# Patient Record
Sex: FEMALE | Race: BLACK OR AFRICAN AMERICAN | NOT HISPANIC OR LATINO | Employment: FULL TIME | URBAN - METROPOLITAN AREA
[De-identification: names, ages, dates, MRNs, and addresses within clinical notes are randomized per-mention and may not be internally consistent; named-entity substitution may affect disease eponyms.]

---

## 2017-02-09 ENCOUNTER — OFFICE VISIT (OUTPATIENT)
Dept: FAMILY MEDICINE | Facility: CLINIC | Age: 32
End: 2017-02-09
Payer: COMMERCIAL

## 2017-02-09 VITALS
OXYGEN SATURATION: 100 % | DIASTOLIC BLOOD PRESSURE: 67 MMHG | RESPIRATION RATE: 16 BRPM | TEMPERATURE: 97.1 F | SYSTOLIC BLOOD PRESSURE: 108 MMHG | WEIGHT: 145 LBS | BODY MASS INDEX: 21.98 KG/M2 | HEIGHT: 68 IN | HEART RATE: 62 BPM

## 2017-02-09 DIAGNOSIS — B37.31 CANDIDIASIS OF VULVA AND VAGINA: ICD-10-CM

## 2017-02-09 DIAGNOSIS — N76.0 BV (BACTERIAL VAGINOSIS): ICD-10-CM

## 2017-02-09 DIAGNOSIS — B96.89 BV (BACTERIAL VAGINOSIS): ICD-10-CM

## 2017-02-09 DIAGNOSIS — R10.9 FLANK PAIN: ICD-10-CM

## 2017-02-09 DIAGNOSIS — R30.0 DYSURIA: Primary | ICD-10-CM

## 2017-02-09 DIAGNOSIS — N39.0 URINARY TRACT INFECTION WITHOUT HEMATURIA, SITE UNSPECIFIED: ICD-10-CM

## 2017-02-09 LAB
ALBUMIN UR-MCNC: NEGATIVE MG/DL
APPEARANCE UR: CLEAR
BACTERIA #/AREA URNS HPF: ABNORMAL /HPF
BILIRUB UR QL STRIP: NEGATIVE
COLOR UR AUTO: YELLOW
GLUCOSE UR STRIP-MCNC: NEGATIVE MG/DL
HGB UR QL STRIP: NEGATIVE
KETONES UR STRIP-MCNC: NEGATIVE MG/DL
LEUKOCYTE ESTERASE UR QL STRIP: ABNORMAL
MICRO REPORT STATUS: ABNORMAL
NITRATE UR QL: NEGATIVE
NON-SQ EPI CELLS #/AREA URNS LPF: ABNORMAL /LPF
PH UR STRIP: 6.5 PH (ref 5–7)
RBC #/AREA URNS AUTO: ABNORMAL /HPF (ref 0–2)
SP GR UR STRIP: 1.02 (ref 1–1.03)
SPECIMEN SOURCE: ABNORMAL
URN SPEC COLLECT METH UR: ABNORMAL
UROBILINOGEN UR STRIP-ACNC: 1 EU/DL (ref 0.2–1)
WBC #/AREA URNS AUTO: ABNORMAL /HPF (ref 0–2)
WET PREP SPEC: ABNORMAL

## 2017-02-09 PROCEDURE — 87210 SMEAR WET MOUNT SALINE/INK: CPT | Performed by: NURSE PRACTITIONER

## 2017-02-09 PROCEDURE — 99213 OFFICE O/P EST LOW 20 MIN: CPT | Performed by: NURSE PRACTITIONER

## 2017-02-09 PROCEDURE — 81001 URINALYSIS AUTO W/SCOPE: CPT | Performed by: NURSE PRACTITIONER

## 2017-02-09 RX ORDER — COPPER 313.4 MG/1
1 INTRAUTERINE DEVICE INTRAUTERINE ONCE
COMMUNITY
End: 2017-12-20

## 2017-02-09 RX ORDER — METRONIDAZOLE 7.5 MG/G
1 GEL VAGINAL AT BEDTIME
Qty: 70 G | Refills: 0 | Status: SHIPPED | OUTPATIENT
Start: 2017-02-09 | End: 2017-02-14

## 2017-02-09 RX ORDER — FLUCONAZOLE 150 MG/1
150 TABLET ORAL ONCE
Qty: 1 TABLET | Refills: 0 | Status: SHIPPED | OUTPATIENT
Start: 2017-02-09 | End: 2017-02-09

## 2017-02-09 RX ORDER — CEPHALEXIN 500 MG/1
500 CAPSULE ORAL 3 TIMES DAILY
Qty: 21 CAPSULE | Refills: 0 | Status: SHIPPED | OUTPATIENT
Start: 2017-02-09 | End: 2017-02-28

## 2017-02-09 ASSESSMENT — PAIN SCALES - GENERAL: PAINLEVEL: NO PAIN (0)

## 2017-02-09 NOTE — PROGRESS NOTES
"  SUBJECTIVE:                                                    Adrian Gomez is a 31 year old female who presents to clinic today for the following health issues:    URINARY TRACT SYMPTOMS     Onset: 3-4 days     Description:   Painful urination (Dysuria): YES and frequency  Blood in urine (Hematuria): no   Delay in urine (Hesitency): no     Intensity: mild    Progression of Symptoms:  worsening    Accompanying Signs & Symptoms:  Fever/chills: no   Flank pain YES-bilateral  Nausea and vomiting: YES-nausea, no vomiting  Any vaginal symptoms: vaginal discharge  Abdominal/Pelvic Pain: YES-suprapubic   History:   History of frequent UTI's: was recently treated for UTI  History of kidney stones: no   Sexually Active: YES  Possibility of pregnancy: No    Precipitating factors:   Recently treated for UTI         Therapies Tried and outcome: none  Patient is still breast feeding once a day.  She did not complete the Keflex treatment as prescribed in December- took it for a couple of day, stopped it as she was feeling better but symptoms returned in January and she then finished the antibiotic.      Problem list and histories reviewed & adjusted, as indicated.  Additional history: as documented    BP Readings from Last 3 Encounters:   02/09/17 108/67   12/30/16 102/72   04/05/16 110/58    Wt Readings from Last 3 Encounters:   02/09/17 145 lb (65.772 kg)   12/30/16 140 lb (63.504 kg)   04/05/16 150 lb (68.04 kg)                  Labs reviewed in EPIC  Problem list, Medication list, Allergies, and Medical/Social/Surgical histories reviewed in [facility] and updated as appropriate.    ROS:  Constitutional, HEENT, cardiovascular, pulmonary, gi and gu systems are negative, except as otherwise noted.    OBJECTIVE:                                                    /67 mmHg  Pulse 62  Temp(Src) 97.1  F (36.2  C) (Oral)  Resp 16  Ht 5' 7.75\" (1.721 m)  Wt 145 lb (65.772 kg)  BMI 22.21 kg/m2  SpO2 100%  LMP 01/27/2017  " "Breastfeeding? Yes  Body mass index is 22.21 kg/(m^2).  GENERAL: healthy, alert and no distress  NECK: no adenopathy, no asymmetry, masses, or scars and thyroid normal to palpation  RESP: lungs clear to auscultation - no rales, rhonchi or wheezes  CV: regular rate and rhythm, normal S1 S2, no S3 or S4, no murmur, click or rub, no peripheral edema and peripheral pulses strong  ABDOMEN: soft, nontender, no hepatosplenomegaly, no masses and bowel sounds normal   (female): deferred  MS: no gross musculoskeletal defects noted, no edema  SKIN: no suspicious lesions or rashes  BACK: no CVA tenderness, no paralumbar tenderness  PSYCH: mentation appears normal, affect normal/bright    Diagnostic Test Results:  Results for orders placed or performed in visit on 02/09/17 (from the past 24 hour(s))   *UA reflex to Microscopic and Culture ([facility] (except [place] and [place])   Result Value Ref Range    Color Urine Yellow     Appearance Urine Clear     Glucose Urine Negative NEG mg/dL    Bilirubin Urine Negative NEG    Ketones Urine Negative NEG mg/dL    Specific Gravity Urine 1.020 1.003 - 1.035    Blood Urine Negative NEG    pH Urine 6.5 5.0 - 7.0 pH    Protein Albumin Urine Negative NEG mg/dL    Urobilinogen Urine 1.0 0.2 - 1.0 EU/dL    Nitrite Urine Negative NEG    Leukocyte Esterase Urine Small (A) NEG    Source Midstream Urine    Urine Microscopic   Result Value Ref Range    WBC Urine 5-10 (A) 0 - 2 /HPF    RBC Urine O - 2 0 - 2 /HPF    Squamous Epithelial /LPF Urine Few FEW /LPF    Bacteria Urine Few (A) NEG /HPF        ASSESSMENT/PLAN:                                                          BMI:   Estimated body mass index is 22.21 kg/(m^2) as calculated from the following:    Height as of this encounter: 5' 7.75\" (1.721 m).    Weight as of this encounter: 145 lb (65.772 kg).         1. Dysuria    - *UA reflex to Microscopic and Culture ([facility] (except " [place] and [place])  - Urine Microscopic  - Wet prep    2. Flank pain      3. Urinary tract infection without hematuria, site unspecified  Instructed to increase oral fluids, reviewed genital hygiene measures, return to clinic if not improved, new, or worsening symptoms.   - cephALEXin (KEFLEX) 500 MG capsule; Take 1 capsule (500 mg) by mouth 3 times daily  Dispense: 21 capsule; Refill: 0    See Patient Instructions    [facility]

## 2017-02-09 NOTE — MR AVS SNAPSHOT
After Visit Summary   2/9/2017    Adrian Gomez    MRN: [id number]           Patient Information     Date Of Birth          1985        Visit Information        Provider Department      2/9/2017 8:20 AM Ama Hazel APRN [facility]        Today's Diagnoses     Dysuria    -  1     Flank pain         Urinary tract infection without hematuria, site unspecified           Care Instructions    How to contact your care team: [phone] Pharmacy [phone]     Clinic hours M-Th 7am-7pm Fri 7am-5pm.   Urgent care M-F 11am-9pm  Sat/Sun 9am-5pm.   Pharmacy   Mon-Th:  8:00am-8pm   Fri:  8:00am-6:00pm  Sat/Sun  8:00am-5:00 pm       Urinary Tract Infections in Women  Urinary tract infections (UTIs) are most often caused by bacteria (germs). These bacteria enter the urinary tract. The bacteria may come from outside the body. Or they may travel from the skin outside the rectum or vagina into the urethra. Female anatomy makes it easier for bacteria from the bowel to enter a woman s urinary tract, which is the most common source of UTI. This means women develop UTIs more often than men. Pain in or around the urinary tract is a common UTI symptom. But the only way to know for sure if you have a UTI for the health care provider to test your urine. The two tests that may be done are the urinalysis and urine culture.  Types of UTIs    Cystitis: A bladder infection (cystitis) is the most common UTI in women. You may have urgent or frequent urination. You may also have pain, burning when you urinate, and bloody urine.    Urethritis: This is an inflamed urethra, which is the tube that carries urine from the bladder to outside the body. You may have lower stomach or back pain. You may also have urgent or frequent urination.    Pyelonephritis: This is a kidney infection. If not treated, it can be serious and damage your kidneys. In severe cases, you may be hospitalized. You may have a  fever and lower back pain.  Medications to treat a UTI  Most UTIs are treated with antibiotics. These kill the bacteria. The length of time you need to take them depends on the type of infection. It may be as short as 3 days. If you have repeated UTIs, a low-dose antibiotic may be needed for several months. Take antibiotics exactly as directed. Don t stop taking them until all of the medication is gone. If you stop taking the antibiotic too soon, the infection may not go away, and you may develop a resistance to the antibiotic. This can make it much harder to treat.  Lifestyle changes to treat and prevent UTIs  The lifestyle changes below will help get rid of your UTI. They may also help prevent future UTIs.    Drink plenty of fluids. This includes water, juice, or other caffeine-free drinks. Fluids help flush bacteria out of your body.    Empty your bladder. Always empty your bladder when you feel the urge to urinate. And always urinate before going to sleep. Urine that stays in your bladder can lead to infection. Try to urinate before and after sex as well.    Practice good personal hygiene. Wipe yourself from front to back after using the toilet. This helps keep bacteria from getting into the urethra.    Use condoms during sex. These help prevent UTIs caused by sexually transmitted bacteria. Also, avoid using spermicides during sex. These can increase the risk of UTIs. Choose other forms of birth control instead. For women who tend to get UTIs after sex, a low-dose of a preventive antibiotic may be used. Be sure to discuss this option with your health care provider.    Follow up with your health care provider as directed. He or she may test to make sure the infection has cleared. If necessary, additional treatment may be started.    0809-6667 [employer]. [address] [place] [address], [place] [address]. All rights reserved. This information is not intended as a substitute for professional medical care.  "Always follow your healthcare professional's instructions.              Follow-ups after your visit        Who to contact     If you have questions or need follow up information about today's clinic visit or your schedule please contact [facility] ANITA BARRIGA directly at [phone].  Normal or non-critical lab and imaging results will be communicated to you by MyChart, letter or phone within 4 business days after the clinic has received the results. If you do not hear from us within 7 days, please contact the clinic through MyChart or phone. If you have a critical or abnormal lab result, we will notify you by phone as soon as possible.  Submit refill requests through [employer] or call your pharmacy and they will forward the refill request to us. Please allow 3 business days for your refill to be completed.          Additional Information About Your Visit        [employer] Information     [employer] lets you send messages to your doctor, view your test results, renew your prescriptions, schedule appointments and more. To sign up, go to [email or web address] . Click on \"Log in\" on the left side of the screen, which will take you to the Welcome page. Then click on \"Sign up Now\" on the right side of the page.     You will be asked to enter the access code listed below, as well as some personal information. Please follow the directions to create your username and password.     Your access code is: [email or web address]  Expires: 5/10/2017  9:05 AM     Your access code will  in 90 days. If you need help or a new code, please call your [facility] clinic or [phone].        Care EveryWhere ID     This is your Care EveryWhere ID. This could be used by other organizations to access your [facility] medical records  [id number]        Your Vitals Were     Pulse Temperature Respirations    62 97.1  F (36.2  C) (Oral) 16    Height BMI (Body Mass Index) Pulse Oximetry    5' 7.75\" (1.721 m) 22.21 kg/m2 100%    Last Period " Breastfeeding?       01/27/2017 Yes        Blood Pressure from Last 3 Encounters:   02/09/17 108/67   12/30/16 102/72   04/05/16 110/58    Weight from Last 3 Encounters:   02/09/17 145 lb (65.772 kg)   12/30/16 140 lb (63.504 kg)   04/05/16 150 lb (68.04 kg)              We Performed the Following     *UA reflex to Microscopic and Culture ([facility] (except [place] and [place])     Urine Microscopic     Wet prep          Today's Medication Changes          These changes are accurate as of: 2/9/17  9:05 AM.  If you have any questions, ask your nurse or doctor.               Start taking these medicines.        Dose/Directions    cephALEXin 500 MG capsule   Commonly known as:  KEFLEX   Used for:  Urinary tract infection without hematuria, site unspecified   Started by:  Ama Hazel APRN CNP        Dose:  500 mg   Take 1 capsule (500 mg) by mouth 3 times daily   Quantity:  21 capsule   Refills:  0            Where to get your medicines      These medications were sent to [facility] - [place], [place] - [address]  [address] [facility] [address]     Phone:  [phone]    - cephALEXin 500 MG capsule             Primary Care Provider    None Specified       No primary provider on file.        Thank you!     Thank you for choosing [facility]  for your care. Our goal is always to provide you with excellent care. Hearing back from our patients is one way we can continue to improve our services. Please take a few minutes to complete the written survey that you may receive in the mail after your visit with us. Thank you!             Your Updated Medication List - Protect others around you: Learn how to safely use, store and throw away your medicines at www.disposemymeds.org.          This list is accurate as of: 2/9/17  9:05 AM.  Always use your most recent med list.                   Brand Name Dispense Instructions for use     cephALEXin 500 MG capsule    KEFLEX    21 capsule    Take 1 capsule (500 mg) by mouth 3 times daily       paragard intrauterine copper      1 each by Intrauterine route once

## 2017-02-09 NOTE — NURSING NOTE
"Chief Complaint   Patient presents with     Urinary Problem       Initial /67 mmHg  Pulse 62  Temp(Src) 97.1  F (36.2  C) (Oral)  Resp 16  Ht 5' 7.75\" (1.721 m)  Wt 145 lb (65.772 kg)  BMI 22.21 kg/m2  SpO2 100%  LMP 01/27/2017  Breastfeeding? Yes Estimated body mass index is 22.21 kg/(m^2) as calculated from the following:    Height as of this encounter: 5' 7.75\" (1.721 m).    Weight as of this encounter: 145 lb (65.772 kg).  Medication Reconciliation: complete     Marge Loya MA       "

## 2017-02-09 NOTE — PATIENT INSTRUCTIONS
How to contact your care team: [phone] Pharmacy [phone]     Clinic hours M-Th 7am-7pm Fri 7am-5pm.   Urgent care M-F 11am-9pm  Sat/Sun 9am-5pm.   Pharmacy   Mon-Th:  8:00am-8pm   Fri:  8:00am-6:00pm  Sat/Sun  8:00am-5:00 pm       Urinary Tract Infections in Women  Urinary tract infections (UTIs) are most often caused by bacteria (germs). These bacteria enter the urinary tract. The bacteria may come from outside the body. Or they may travel from the skin outside the rectum or vagina into the urethra. Female anatomy makes it easier for bacteria from the bowel to enter a woman s urinary tract, which is the most common source of UTI. This means women develop UTIs more often than men. Pain in or around the urinary tract is a common UTI symptom. But the only way to know for sure if you have a UTI for the health care provider to test your urine. The two tests that may be done are the urinalysis and urine culture.  Types of UTIs    Cystitis: A bladder infection (cystitis) is the most common UTI in women. You may have urgent or frequent urination. You may also have pain, burning when you urinate, and bloody urine.    Urethritis: This is an inflamed urethra, which is the tube that carries urine from the bladder to outside the body. You may have lower stomach or back pain. You may also have urgent or frequent urination.    Pyelonephritis: This is a kidney infection. If not treated, it can be serious and damage your kidneys. In severe cases, you may be hospitalized. You may have a fever and lower back pain.  Medications to treat a UTI  Most UTIs are treated with antibiotics. These kill the bacteria. The length of time you need to take them depends on the type of infection. It may be as short as 3 days. If you have repeated UTIs, a low-dose antibiotic may be needed for several months. Take antibiotics exactly as directed. Don t stop taking them until all of the medication is gone. If you stop taking the antibiotic  too soon, the infection may not go away, and you may develop a resistance to the antibiotic. This can make it much harder to treat.  Lifestyle changes to treat and prevent UTIs  The lifestyle changes below will help get rid of your UTI. They may also help prevent future UTIs.    Drink plenty of fluids. This includes water, juice, or other caffeine-free drinks. Fluids help flush bacteria out of your body.    Empty your bladder. Always empty your bladder when you feel the urge to urinate. And always urinate before going to sleep. Urine that stays in your bladder can lead to infection. Try to urinate before and after sex as well.    Practice good personal hygiene. Wipe yourself from front to back after using the toilet. This helps keep bacteria from getting into the urethra.    Use condoms during sex. These help prevent UTIs caused by sexually transmitted bacteria. Also, avoid using spermicides during sex. These can increase the risk of UTIs. Choose other forms of birth control instead. For women who tend to get UTIs after sex, a low-dose of a preventive antibiotic may be used. Be sure to discuss this option with your health care provider.    Follow up with your health care provider as directed. He or she may test to make sure the infection has cleared. If necessary, additional treatment may be started.    2371-9441 [employer] [address] [place] [address], [place] [address]. All rights reserved. This information is not intended as a substitute for professional medical care. Always follow your healthcare professional's instructions.

## 2017-02-22 ENCOUNTER — TELEPHONE (OUTPATIENT)
Dept: NURSING | Facility: CLINIC | Age: 32
End: 2017-02-22

## 2017-02-22 NOTE — TELEPHONE ENCOUNTER
"Call Type: Triage Call    Presenting Problem: \"See for UTI\" in December. Given cephalsporin.  Never did a sensitivity. Having symptoms again. Frequency with  urination.  Triage Note:  Guideline Title: Urinary Symptoms - Female  Recommended Disposition: See Provider within 24 hours  Original Inclination: Did not know what to do  Override Disposition:  Intended Action: Follow advice given  Physician Contacted: No  Has one or more urinary tract symptoms AND has not been previously evaluated ?  YES  Blood in urine ? NO  Abnormal vaginal discharge (such as increased quantity, abnormal color,  consistency, odor) ? NO  Flank pain ? NO  New or worsening signs and symptoms that may indicate shock ? NO  Any temperature elevation in a frail elderly, immunocompromised or pregnant person  ? NO  Unbearable abdominal/pelvic pain ? NO  Current or recent urinary tract instrumentation AND urinary tract symptoms OR no  urine flow ? NO  Urinary tract symptoms AND any flank or low back pain ? NO  Urinary tract symptoms AND fever 101.5 F (38.6 C) or higher or vomiting ? NO  No urination for 12 or more hours ? NO  Any other unexpected urinary symptoms following urinary tract or abdominal surgery  within timeframe specified by provider ? NO  Physician Instructions:  Care Advice: Call provider if you develop flank or low back pain, fever,  generally feel sick.  SYMPTOM / CONDITION MANAGEMENT  Call provider if urine is pink, red, smoky or cola colored.  Increase intake of fluids. Try to drink 8 oz. (.2 liter) every hour when  awake, unless on restricted fluids for other medical reasons. Include at  least two 8 oz. (.2 liter) glasses of unsweetened cranberry juice each day.  Take sips of fluid or eat ice chips if nauseated or vomiting.  Analgesic/Antipyretic Advice - NSAIDs: Consider aspirin, ibuprofen,  naproxen or ketoprofen for pain or fever as directed on label or by  pharmacist/provider. PRECAUTIONS: - You should not take this medicine " for  more than 10 days unless recommended by your provider. EXCEPTIONS: - Should  not be used if taking blood thinners or have bleeding problems. - Do not  use if have history of sensitivity/allergy to any of these medications  or history of cardiovascular, ulcer, kidney, liver disease or diabetes  unless approved by provider. - Do not exceed recommended dose or frequency.

## 2017-02-28 ENCOUNTER — OFFICE VISIT (OUTPATIENT)
Dept: FAMILY MEDICINE | Facility: CLINIC | Age: 32
End: 2017-02-28
Payer: COMMERCIAL

## 2017-02-28 VITALS
SYSTOLIC BLOOD PRESSURE: 110 MMHG | HEART RATE: 68 BPM | WEIGHT: 146 LBS | OXYGEN SATURATION: 100 % | BODY MASS INDEX: 22.13 KG/M2 | HEIGHT: 68 IN | TEMPERATURE: 96.2 F | DIASTOLIC BLOOD PRESSURE: 70 MMHG

## 2017-02-28 DIAGNOSIS — N30.00 ACUTE CYSTITIS WITHOUT HEMATURIA: ICD-10-CM

## 2017-02-28 DIAGNOSIS — R82.90 NONSPECIFIC FINDING ON EXAMINATION OF URINE: Primary | ICD-10-CM

## 2017-02-28 LAB
ALBUMIN UR-MCNC: NEGATIVE MG/DL
APPEARANCE UR: ABNORMAL
BILIRUB UR QL STRIP: NEGATIVE
COLOR UR AUTO: YELLOW
GLUCOSE UR STRIP-MCNC: NEGATIVE MG/DL
HGB UR QL STRIP: NEGATIVE
KETONES UR STRIP-MCNC: NEGATIVE MG/DL
LEUKOCYTE ESTERASE UR QL STRIP: ABNORMAL
NITRATE UR QL: NEGATIVE
NON-SQ EPI CELLS #/AREA URNS LPF: ABNORMAL /LPF
PH UR STRIP: 7 PH (ref 5–7)
RBC #/AREA URNS AUTO: ABNORMAL /HPF (ref 0–2)
SP GR UR STRIP: 1.01 (ref 1–1.03)
URN SPEC COLLECT METH UR: ABNORMAL
UROBILINOGEN UR STRIP-ACNC: 0.2 EU/DL (ref 0.2–1)
WBC #/AREA URNS AUTO: ABNORMAL /HPF (ref 0–2)

## 2017-02-28 PROCEDURE — 87086 URINE CULTURE/COLONY COUNT: CPT | Performed by: FAMILY MEDICINE

## 2017-02-28 PROCEDURE — 99213 OFFICE O/P EST LOW 20 MIN: CPT | Performed by: FAMILY MEDICINE

## 2017-02-28 PROCEDURE — 81001 URINALYSIS AUTO W/SCOPE: CPT | Performed by: FAMILY MEDICINE

## 2017-02-28 RX ORDER — SULFAMETHOXAZOLE/TRIMETHOPRIM 800-160 MG
1 TABLET ORAL 2 TIMES DAILY
Qty: 14 TABLET | Refills: 0 | Status: SHIPPED | OUTPATIENT
Start: 2017-02-28 | End: 2017-03-07

## 2017-02-28 NOTE — MR AVS SNAPSHOT
After Visit Summary   2/28/2017    Adrian Gomez    MRN: [id number]           Patient Information     Date Of Birth          1985        Visit Information        Provider Department      2/28/2017 3:40 PM Jessica Nunez MD [facility]        Today's Diagnoses     Nonspecific finding on examination of urine    -  1    Acute cystitis without hematuria          Care Instructions      [facility] (formerly [facility])    If you have any questions regarding to your visit please contact your care team:       Team Red:   Clinic Hours Telephone Number   Dr. Jovanna Palomo  (pediatrics)  Natalie Slaughter NP 7am-7pm  Monday - Thursday   7am-5pm  Fridays  [phone] (fax)    Caterina STONE  [phone]   Urgent Care - [place] and [place] Monday-Friday  [place] - 11am-8pm  Saturday-Sunday  Both sites - 9am-5pm  [phone] - [facility]  [phone] - [place]       What options do I have for visits at the clinic other than the traditional office visit?  To expand how we care for you, many of our providers are utilizing electronic visits (e-visits) and telephone visits, when medically appropriate, for interactions with their patients rather than a visit in the clinic.   We also offer nurse visits for many medical concerns. Just like any other service, we will bill your insurance company for this type of visit based on time spent on the phone with your provider. Not all insurance companies cover these visits. Please check with your medical insurance if this type of visit is covered. You will be responsible for any charges that are not paid by your insurance.      E-visits via [employer]:  generally incur a $35.00 fee.  Telephone visits:  Time spent on the phone: *charged based on time that is spent on the phone in increments of 10 minutes. Estimated cost:   5-10 mins $30.00   11-20 mins. $59.00   21-30 mins. $85.00     As always, Thank you for  trusting us with your health care needs!            Understanding Urinary Tract Infections (UTIs)  Most UTIs are caused by bacteria, although they may also be caused by viruses or fungi. Bacteria from the bowel are the most common source of infection. The infection may begin because of any of the following:    Sexual activity. During sex, germs can travel from the penis, vagina, or rectum into the urethra.     Germs on the skin outside the rectum may travel into the urethra. This is more common in women since the rectum and urethra are closer to each other than in men. Wiping from front to back after using the toilet and keeping the area clean can help prevent germs from getting to the urethra.    Blockage of urine flow through the urinary tract. If urine sits too long, germs may begin to grow out of control.      Parts of the urinary tract  The infection can occur in any part of the urinary tract.    The kidneys collect and store urine.    The ureters carry urine from the kidneys to the bladder.    The bladder holds urine until you are ready to let it out.    The urethra carries urine from the bladder out of the body. It is shorter in women, so bacteria can move through it more easily. The urethra is longer in men, so a UTI is less likely to reach the bladder or kidneys in men.    7717-8898 [employer]. [address] [place] [address], [place] [id number]. All rights reserved. This information is not intended as a substitute for professional medical care. Always follow your healthcare professional's instructions.        Anatomy of the Female Urinary Tract  Your urinary tract helps get rid of urine (your body s liquid waste). The kidneys collect chemicals and water your body doesn t need. This is turned into urine. Urine travels out of the kidneys through the ureters to the bladder. The bladder holds urine until you re ready to release it. The urethra carries urine from the bladder out of the body. The main  "sphincter muscle circles the mid-urethra.       1257-0266 [employer]. [address] [place] [address], [place] [address]. All rights reserved. This information is not intended as a substitute for professional medical care. Always follow your healthcare professional's instructions.      Discharge LORETTA Erwin  [facility]          Follow-ups after your visit        Who to contact     If you have questions or need follow up information about today's clinic visit or your schedule please contact [facility] directly at [phone].  Normal or non-critical lab and imaging results will be communicated to you by Cirrascalehart, letter or phone within 4 business days after the clinic has received the results. If you do not hear from us within 7 days, please contact the clinic through Liftopiat or phone. If you have a critical or abnormal lab result, we will notify you by phone as soon as possible.  Submit refill requests through [employer] Hunted or call your pharmacy and they will forward the refill request to us. Please allow 3 business days for your refill to be completed.          Additional Information About Your Visit        CirrascaleharStoryBlender Information     [employer] gives you secure access to your electronic health record. If you see a primary care provider, you can also send messages to your care team and make appointments. If you have questions, please call your primary care clinic.  If you do not have a primary care provider, please call [phone] and they will assist you.        Care EveryWhere ID     This is your Care EveryWhere ID. This could be used by other organizations to access your [facility] medical records  [id number]        Your Vitals Were     Pulse Temperature Height Last Period Pulse Oximetry BMI (Body Mass Index)    68 96.2  F (35.7  C) (Oral) 5' 7.75\" (1.721 m) 02/19/2017 100% 22.36 kg/m2       Blood Pressure from Last 3 Encounters:   02/28/17 110/70   02/09/17 108/67   12/30/16 102/72    Weight from Last " 3 Encounters:   02/28/17 146 lb (66.2 kg)   02/09/17 145 lb (65.8 kg)   12/30/16 140 lb (63.5 kg)              We Performed the Following     UA reflex to Microscopic and Culture     Urine Culture Aerobic Bacterial     Urine Culture Aerobic Bacterial     Urine Microscopic          Today's Medication Changes          These changes are accurate as of: 2/28/17  4:26 PM.  If you have any questions, ask your nurse or doctor.               Start taking these medicines.        Dose/Directions    sulfamethoxazole-trimethoprim 800-160 MG per tablet   Commonly known as:  BACTRIM DS/SEPTRA DS   Used for:  Acute cystitis without hematuria   Started by:  Jessica Nunez MD        Dose:  1 tablet   Take 1 tablet by mouth 2 times daily for 7 days   Quantity:  14 tablet   Refills:  0            Where to get your medicines      These medications were sent to [facility] - [place], [place] - [address]  [address], [facility] [address]     Phone:  [phone]     sulfamethoxazole-trimethoprim 800-160 MG per tablet                Primary Care Provider    None Specified       No primary provider on file.        Thank you!     Thank you for choosing [facility]  for your care. Our goal is always to provide you with excellent care. Hearing back from our patients is one way we can continue to improve our services. Please take a few minutes to complete the written survey that you may receive in the mail after your visit with us. Thank you!             Your Updated Medication List - Protect others around you: Learn how to safely use, store and throw away your medicines at www.disposemymeds.org.          This list is accurate as of: 2/28/17  4:26 PM.  Always use your most recent med list.                   Brand Name Dispense Instructions for use    paragard intrauterine copper      1 each by Intrauterine route once       sulfamethoxazole-trimethoprim 800-160 MG per tablet    BACTRIM  DS/SEPTRA DS    14 tablet    Take 1 tablet by mouth 2 times daily for 7 days

## 2017-02-28 NOTE — PATIENT INSTRUCTIONS
[facility] ([facility]    If you have any questions regarding to your visit please contact your care team:       Team Red:   Clinic Hours Telephone Number   Dr. Jovanna Palomo  (pediatrics)  Natalie Slaughter NP 7am-7pm  Monday - Thursday   7am-5pm  Fridays  [phone] (fax)    Caterina STONE  [phone]   Urgent Care - [place] and [place] Monday-Friday  [place] - 11am-8pm  Saturday-Sunday  Both sites - 9am-5pm  [phone] - [facility]  [phone] - [place]       What options do I have for visits at the clinic other than the traditional office visit?  To expand how we care for you, many of our providers are utilizing electronic visits (e-visits) and telephone visits, when medically appropriate, for interactions with their patients rather than a visit in the clinic.   We also offer nurse visits for many medical concerns. Just like any other service, we will bill your insurance company for this type of visit based on time spent on the phone with your provider. Not all insurance companies cover these visits. Please check with your medical insurance if this type of visit is covered. You will be responsible for any charges that are not paid by your insurance.      E-visits via CloudOpt:  generally incur a $35.00 fee.  Telephone visits:  Time spent on the phone: *charged based on time that is spent on the phone in increments of 10 minutes. Estimated cost:   5-10 mins $30.00   11-20 mins. $59.00   21-30 mins. $85.00     As always, Thank you for trusting us with your health care needs!            Understanding Urinary Tract Infections (UTIs)  Most UTIs are caused by bacteria, although they may also be caused by viruses or fungi. Bacteria from the bowel are the most common source of infection. The infection may begin because of any of the following:    Sexual activity. During sex, germs can travel from the penis, vagina, or rectum into the  urethra.     Germs on the skin outside the rectum may travel into the urethra. This is more common in women since the rectum and urethra are closer to each other than in men. Wiping from front to back after using the toilet and keeping the area clean can help prevent germs from getting to the urethra.    Blockage of urine flow through the urinary tract. If urine sits too long, germs may begin to grow out of control.      Parts of the urinary tract  The infection can occur in any part of the urinary tract.    The kidneys collect and store urine.    The ureters carry urine from the kidneys to the bladder.    The bladder holds urine until you are ready to let it out.    The urethra carries urine from the bladder out of the body. It is shorter in women, so bacteria can move through it more easily. The urethra is longer in men, so a UTI is less likely to reach the bladder or kidneys in men.    8224-0194 [employer]. [address] [place] [address]. All rights reserved. This information is not intended as a substitute for professional medical care. Always follow your healthcare professional's instructions.        Anatomy of the Female Urinary Tract  Your urinary tract helps get rid of urine (your body s liquid waste). The kidneys collect chemicals and water your body doesn t need. This is turned into urine. Urine travels out of the kidneys through the ureters to the bladder. The bladder holds urine until you re ready to release it. The urethra carries urine from the bladder out of the body. The main sphincter muscle circles the mid-urethra.       8287-1331 [employer]. [address] [place] [address]. All rights reserved. This information is not intended as a substitute for professional medical care. Always follow your healthcare professional's instructions.      Discharge LORETTA Erwin CMA

## 2017-02-28 NOTE — NURSING NOTE
"Chief Complaint   Patient presents with     UTI       Initial /70 (BP Location: Left arm, Patient Position: Chair, Cuff Size: Adult Regular)  Pulse 68  Temp 96.2  F (35.7  C) (Oral)  Ht 5' 7.75\" (1.721 m)  Wt 146 lb (66.2 kg)  LMP 02/19/2017  SpO2 100%  BMI 22.36 kg/m2 Estimated body mass index is 22.36 kg/(m^2) as calculated from the following:    Height as of this encounter: 5' 7.75\" (1.721 m).    Weight as of this encounter: 146 lb (66.2 kg).  Medication Reconciliation: complete     Constance Gipson MA      "

## 2017-02-28 NOTE — PROGRESS NOTES
SUBJECTIVE:                                                    Adrian Gomez is a 31 year old female who presents to clinic today for the following health issues:      URINARY TRACT SYMPTOMS      Duration: 2 days    Description  Frequency, and a pain when finishing urinating (like a pinch)    Intensity:  mild    Accompanying signs and symptoms:  Fever/chills: no   Flank pain no   Nausea and vomiting: no   Vaginal symptoms: none  Abdominal/Pelvic Pain: no     History  History of frequent UTI's: YES- recent UTI 3 weeks ago  And also in December  History of kidney stones: no   Sexually Active: YES  Possibility of pregnancy: No    Precipitating or alleviating factors: None    Therapies tried and outcome: none              Problem list and histories reviewed & adjusted, as indicated.  Additional history: as documented    Patient Active Problem List   Diagnosis     CARDIOVASCULAR SCREENING; LDL GOAL LESS THAN 160     Personal history of tuberculosis     Acute cystitis without hematuria     Past Surgical History   Procedure Laterality Date     None         Social History   Substance Use Topics     Smoking status: Never Smoker     Smokeless tobacco: Never Used     Alcohol use Yes      Comment: 2 drinks a month     Family History   Problem Relation Age of Onset     Family History Negative Mother      Breast Cancer Mother      Family History Negative Father      HEART DISEASE Maternal Grandmother      HEART DISEASE Maternal Grandfather      HEART DISEASE Paternal Grandmother      HEART DISEASE Paternal Grandfather      DIABETES No family hx of      Hypertension No family hx of          Current Outpatient Prescriptions   Medication Sig Dispense Refill     sulfamethoxazole-trimethoprim (BACTRIM DS/SEPTRA DS) 800-160 MG per tablet Take 1 tablet by mouth 2 times daily for 7 days 14 tablet 0     paragard intrauterine copper 1 each by Intrauterine route once       No Known Allergies  No lab results found.   BP Readings from Last 3  "Encounters:   02/28/17 110/70   02/09/17 108/67   12/30/16 102/72    Wt Readings from Last 3 Encounters:   02/28/17 146 lb (66.2 kg)   02/09/17 145 lb (65.8 kg)   12/30/16 140 lb (63.5 kg)                  Labs reviewed in EPIC    Reviewed and updated as needed this visit by clinical staff       Reviewed and updated as needed this visit by Provider         ROS:  C: NEGATIVE for fever, chills, change in weight  E/M: NEGATIVE for ear, mouth and throat problems  R: NEGATIVE for significant cough or SOB  CV: NEGATIVE for chest pain, palpitations or peripheral edema  GI: NEGATIVE for nausea, abdominal pain, heartburn, or change in bowel habits  : normal menstrual cycles and as above    OBJECTIVE:                                                    /70 (BP Location: Left arm, Patient Position: Chair, Cuff Size: Adult Regular)  Pulse 68  Temp 96.2  F (35.7  C) (Oral)  Ht 5' 7.75\" (1.721 m)  Wt 146 lb (66.2 kg)  LMP 02/19/2017  SpO2 100%  BMI 22.36 kg/m2  Body mass index is 22.36 kg/(m^2).  GENERAL: healthy, alert and no distress    CV: regular rate and rhythm, normal S1 S2, no S3 or S4, no murmur, click or rub, no peripheral edema and peripheral pulses strong  ABDOMEN: soft, nontender, no hepatosplenomegaly, no masses and bowel sounds normal  No flank tenderness   MS: no gross musculoskeletal defects noted, no edema    Diagnostic Test Results:  Results for orders placed or performed in visit on 02/28/17 (from the past 24 hour(s))   UA reflex to Microscopic and Culture   Result Value Ref Range    Color Urine Yellow     Appearance Urine Slightly Cloudy     Glucose Urine Negative NEG mg/dL    Bilirubin Urine Negative NEG    Ketones Urine Negative NEG mg/dL    Specific Gravity Urine 1.015 1.003 - 1.035    Blood Urine Negative NEG    pH Urine 7.0 5.0 - 7.0 pH    Protein Albumin Urine Negative NEG mg/dL    Urobilinogen Urine 0.2 0.2 - 1.0 EU/dL    Nitrite Urine Negative NEG    Leukocyte Esterase Urine Small (A) NEG    " Source Midstream Urine    Urine Microscopic   Result Value Ref Range    WBC Urine  (A) 0 - 2 /HPF    RBC Urine 2-5 (A) 0 - 2 /HPF    Squamous Epithelial /LPF Urine Few FEW /LPF        ASSESSMENT/PLAN:                                                        ICD-10-CM    1. Nonspecific finding on examination of urine R82.90 Urine Culture Aerobic Bacterial   2. Acute cystitis without hematuria N30.00 Urine Culture Aerobic Bacterial     sulfamethoxazole-trimethoprim (BACTRIM DS/SEPTRA DS) 800-160 MG per tablet     Await UC  Drink Lots of fluids  Follow up 1 week if not better/sooner if worse        Jessica Nunez MD  [facility]

## 2017-03-01 LAB
BACTERIA SPEC CULT: NORMAL
MICRO REPORT STATUS: NORMAL
SPECIMEN SOURCE: NORMAL

## 2017-04-03 ENCOUNTER — TELEPHONE (OUTPATIENT)
Dept: NURSING | Facility: CLINIC | Age: 32
End: 2017-04-03

## 2017-04-03 NOTE — TELEPHONE ENCOUNTER
"Call Type: Triage Call    Presenting Problem: Patient is having a \"fever\"102 axillary. Then  temp down to 99. and is having nausea and vomiting. Patient has a  coarse cough. Triaged for fever-Adult/Disposition to see ED  immediately.  Triage Note:  Guideline Title: Fever - Adult ; Fever - Adult  Recommended Disposition: See Provider within 8 Hours  Original Inclination: Wanted to speak with a nurse  Override Disposition: See ED Immediately  Intended Action: Go to Hospital / ED  Physician Contacted: No  Temperature of 101.5 F (38.6 C) or greater that has not responded to 24 hours of  home care measures ?  YES  Signs of dehydration ? NO  Abdominal pain is present ? NO  Sudden change in mental status ? NO  New seizure now or within last 6 hours ? NO  Severe breathing problems not related to nasal congestion ? NO  Has urgency, frequency, discolored urine, pain or burning with urination ? NO  Has urgency, frequency, discolored urine, pain or burning with urination ? NO  New skin rash associated with the fever ? NO  Fever associated with heat exposure ? NO  Associated with cold or upper respiratory infection symptoms ? NO  Sudden onset of flu-like symptoms ? NO  Fever 104 F (40.0 C) or higher ? NO  Less than 4 weeks postoperative or post other invasive procedure ? NO  Worsening signs of soft tissue infection ? NO  Any temperature elevation AND new onset of neck pain with forward head movement  (no injury), severe generalized headache, or altered mental status ? NO  Frail elderly or immunocompromised with an oral temperature greater than 99 F  (37.2) or higher ? NO  Pregnant person with temperature of 100.5 F (38.0 C) or higher ? NO  High to low (but not zero) risk of exposure to Ebola within the past 21 days ? NO  Unexplained blood-colored (purple or red) flat pinpoint dots, spots or patches on  the skin ? NO  Physician Instructions:  Care Advice: It takes 20-60 minutes for fever reducing meds to work.  Take  your " temperature 1-2 hours after taking one of these medications to check  if they are working.  CAUTIONS  HEALTH PROMOTION / MAINTENANCE  SYMPTOM / CONDITION MANAGEMENT  Systemic Inflammatory Response Syndrome (SIRS):   Watch for signs of a  generalized, whole body infection. Occurs within days of a localized  infection, especially of the urinary, GI, respiratory or nervous systems  or after a traumatic injury or invasive procedure.   - Call  if  symptoms have worsened, such as increasing confusion or unusual drowsiness  cold and clammy skin  no urine output  rapid respiration (>30/min.) or slow respiration (<10/min.)  struggling to breathe.   - Go to the ED immediately for early symptoms of  rapid pulse >90/min. or rapid breathing >20/min. at rest  chills  oral temperature >100.4 F (38 C) or <96.8 F (36 C) when associated with  conditions noted.  COMFORT MEASURES FOR A FEVER:   - Drink cool liquids or eat ice chips or  popsicles. Avoid drinks with alcohol or caffeine.   - Wear one layer of  light-weight clothing.   - Consider using a fan to improve circulation.   -  Rest until temperature returns to normal and other symptoms improve.   -  Use a lightweight blanket or other bedding.   - A lukewarm (not cold) bath  or shower can help lower body temperature.  Cold water can cause shivering  and raise temperature. If shivering starts, dry off and cover with  lightweight clothing.  Total water intake includes drinking water, water in beverages, and water  contained in food. Fluids make up about 80% of the body's total hydration  need. Individual fluid requirement to maintain hydration vary based on  physical activity, environmental factors and illness. Limit fluids that  contain sugar, caffeine, or alcohol. Urine will be very light yellow color  when you drink enough fluids.  Analgesic/Antipyretic Advice - NSAIDs: Consider aspirin, ibuprofen,  naproxen or ketoprofen for pain or fever as directed on label or  by  pharmacist/provider. PRECAUTIONS: - You should not take this medicine for  more than 10 days unless recommended by your provider. EXCEPTIONS: - Should  not be used if taking blood thinners or have bleeding problems. - Do not  use if have history of sensitivity/allergy to any of these medications  or history of cardiovascular, ulcer, kidney, liver disease or diabetes  unless approved by provider. - Do not exceed recommended dose or frequency.  Analgesic/Antipyretic Advice - Acetaminophen: Consider acetaminophen as  directed on label or by pharmacist/provider for pain or fever. PRECAUTIONS:  - Use if there is no history of liver disease, alcoholism, or intake of  three or more alcohol drinks per day - Only if approved by provider during  pregnancy or when breastfeeding - Do not exceed recommended dose or  frequency. Do not take more than 3000 milligrams (mg) in 24 hours. Do not  take this medicine for more than 10 days unless recommended by your  provider. - During pregnancy, acetaminophen should not be taken more than 3  consecutive days without telling provider - To make sure you don't take too  much, check other medicines you take to see if they also contain  acetaminophen.

## 2017-04-05 ENCOUNTER — PRENATAL OFFICE VISIT (OUTPATIENT)
Dept: OBGYN | Facility: CLINIC | Age: 32
End: 2017-04-05
Payer: COMMERCIAL

## 2017-04-05 VITALS
TEMPERATURE: 98 F | WEIGHT: 145.2 LBS | HEIGHT: 68 IN | DIASTOLIC BLOOD PRESSURE: 66 MMHG | SYSTOLIC BLOOD PRESSURE: 112 MMHG | BODY MASS INDEX: 22.01 KG/M2

## 2017-04-05 DIAGNOSIS — N92.1 METRORRHAGIA: ICD-10-CM

## 2017-04-05 DIAGNOSIS — R35.0 URINARY FREQUENCY: ICD-10-CM

## 2017-04-05 DIAGNOSIS — B96.89 BV (BACTERIAL VAGINOSIS): ICD-10-CM

## 2017-04-05 DIAGNOSIS — N76.0 BV (BACTERIAL VAGINOSIS): ICD-10-CM

## 2017-04-05 DIAGNOSIS — Z30.432 ENCOUNTER FOR IUD REMOVAL: ICD-10-CM

## 2017-04-05 DIAGNOSIS — N89.8 VAGINAL DISCHARGE: ICD-10-CM

## 2017-04-05 DIAGNOSIS — R30.0 DYSURIA: Primary | ICD-10-CM

## 2017-04-05 LAB
ALBUMIN UR-MCNC: NEGATIVE MG/DL
APPEARANCE UR: CLEAR
BACTERIA #/AREA URNS HPF: ABNORMAL /HPF
BETA HCG QUAL IFA URINE: NEGATIVE
BILIRUB UR QL STRIP: NEGATIVE
COLOR UR AUTO: YELLOW
GLUCOSE UR STRIP-MCNC: NEGATIVE MG/DL
HGB UR QL STRIP: ABNORMAL
KETONES UR STRIP-MCNC: NEGATIVE MG/DL
LEUKOCYTE ESTERASE UR QL STRIP: ABNORMAL
MICRO REPORT STATUS: ABNORMAL
NITRATE UR QL: NEGATIVE
PH UR STRIP: 6.5 PH (ref 5–7)
RBC #/AREA URNS AUTO: ABNORMAL /HPF (ref 0–2)
SP GR UR STRIP: 1.02 (ref 1–1.03)
SPECIMEN SOURCE: ABNORMAL
URN SPEC COLLECT METH UR: ABNORMAL
UROBILINOGEN UR STRIP-ACNC: 0.2 EU/DL (ref 0.2–1)
WBC #/AREA URNS AUTO: ABNORMAL /HPF (ref 0–2)
WET PREP SPEC: ABNORMAL

## 2017-04-05 PROCEDURE — 81001 URINALYSIS AUTO W/SCOPE: CPT | Performed by: OBSTETRICS & GYNECOLOGY

## 2017-04-05 PROCEDURE — 58301 REMOVE INTRAUTERINE DEVICE: CPT | Performed by: OBSTETRICS & GYNECOLOGY

## 2017-04-05 PROCEDURE — 87210 SMEAR WET MOUNT SALINE/INK: CPT | Performed by: OBSTETRICS & GYNECOLOGY

## 2017-04-05 PROCEDURE — 99213 OFFICE O/P EST LOW 20 MIN: CPT | Mod: 25 | Performed by: OBSTETRICS & GYNECOLOGY

## 2017-04-05 PROCEDURE — 84703 CHORIONIC GONADOTROPIN ASSAY: CPT | Performed by: OBSTETRICS & GYNECOLOGY

## 2017-04-05 RX ORDER — METRONIDAZOLE 500 MG/1
500 TABLET ORAL 2 TIMES DAILY
Qty: 14 TABLET | Refills: 0 | Status: SHIPPED | OUTPATIENT
Start: 2017-04-05 | End: 2017-12-20

## 2017-04-05 NOTE — MR AVS SNAPSHOT
"              After Visit Summary   4/5/2017    Adrian Gomez    MRN: [id number]           Patient Information     Date Of Birth          1985        Visit Information        Provider Department      4/5/2017 3:00 PM Madiha Poon MD [facility]        Today's Diagnoses     Dysuria    -  1    Urinary frequency        Vaginal discharge        Encounter for IUD removal        Metrorrhagia        BV (bacterial vaginosis)           Follow-ups after your visit        Who to contact     If you have questions or need follow up information about today's clinic visit or your schedule please contact [facility] directly at [phone].  Normal or non-critical lab and imaging results will be communicated to you by MyChart, letter or phone within 4 business days after the clinic has received the results. If you do not hear from us within 7 days, please contact the clinic through urturnhart or phone. If you have a critical or abnormal lab result, we will notify you by phone as soon as possible.  Submit refill requests through Nor1 or call your pharmacy and they will forward the refill request to us. Please allow 3 business days for your refill to be completed.          Additional Information About Your Visit        MyChart Information     Nor1 gives you secure access to your electronic health record. If you see a primary care provider, you can also send messages to your care team and make appointments. If you have questions, please call your primary care clinic.  If you do not have a primary care provider, please call [phone] and they will assist you.        Care EveryWhere ID     This is your Care EveryWhere ID. This could be used by other organizations to access your [facility] medical records  [id number]        Your Vitals Were     Temperature Height Last Period Breastfeeding? BMI (Body Mass Index)       98  F (36.7  C) (Oral) 5' 7.75\" (1.721 m) 03/16/2017 " (Approximate) No 22.24 kg/m2        Blood Pressure from Last 3 Encounters:   04/05/17 112/66   02/28/17 110/70   02/09/17 108/67    Weight from Last 3 Encounters:   04/05/17 145 lb 3.2 oz (65.9 kg)   02/28/17 146 lb (66.2 kg)   02/09/17 145 lb (65.8 kg)              We Performed the Following     Beta HCG qual IFA urine     REMOVE INTRAUTERINE DEVICE     UA reflex to Microscopic and Culture     Urine Microscopic     Wet prep          Today's Medication Changes          These changes are accurate as of: 4/5/17 11:59 PM.  If you have any questions, ask your nurse or doctor.               Start taking these medicines.        Dose/Directions    metroNIDAZOLE 500 MG tablet   Commonly known as:  FLAGYL   Used for:  BV (bacterial vaginosis)   Started by:  Madiha Poon MD        Dose:  500 mg   Take 1 tablet (500 mg) by mouth 2 times daily   Quantity:  14 tablet   Refills:  0            Where to get your medicines      These medications were sent to [address]  [address] [place] [address]     Phone:  [phone]     metroNIDAZOLE 500 MG tablet                Primary Care Provider    None Specified       No primary provider on file.        Thank you!     Thank you for choosing [facility]  for your care. Our goal is always to provide you with excellent care. Hearing back from our patients is one way we can continue to improve our services. Please take a few minutes to complete the written survey that you may receive in the mail after your visit with us. Thank you!             Your Updated Medication List - Protect others around you: Learn how to safely use, store and throw away your medicines at www.disposemymeds.org.          This list is accurate as of: 4/5/17 11:59 PM.  Always use your most recent med list.                   Brand Name Dispense Instructions for use    metroNIDAZOLE 500 MG tablet    FLAGYL    14  tablet    Take 1 tablet (500 mg) by mouth 2 times daily       paragard intrauterine copper      1 each by Intrauterine route once

## 2017-04-05 NOTE — NURSING NOTE
"Chief Complaint   Patient presents with     UTI       Initial /66 (BP Location: Right arm, Patient Position: Chair, Cuff Size: Adult Regular)  Temp 98  F (36.7  C) (Oral)  Ht 5' 7.75\" (1.721 m)  Wt 145 lb 3.2 oz (65.9 kg)  LMP 2017 (Approximate)  Breastfeeding? No  BMI 22.24 kg/m2 Estimated body mass index is 22.24 kg/(m^2) as calculated from the following:    Height as of this encounter: 5' 7.75\" (1.721 m).    Weight as of this encounter: 145 lb 3.2 oz (65.9 kg).  BP completed using cuff size: regular        The following HM Due: NONE      The following patient reported/Care Every where data was sent to:  P ABSTRACT QUALITY INITIATIVES [15011]  n/a     n/a and patient has appointment for today             "

## 2017-04-05 NOTE — PROGRESS NOTES
"CC: bladder issues   HPI:  Adrian Gomez is a 32 year old female  here for a consultation regarding:     Bleeds over half the month between her period and spotting.   Wants another pregnancy in one yr.   Has a paragard IUD but does not like it.    Feels like bladder pressure and discomfort. Not sure if it is UTI.    Slight vaginal discharge as well.  But does a lot of spotting.   Things don't feel right.     Patient's last menstrual period was 2017 (approximate).          Past GYN history:   Lab Results   Component Value Date    PAP NIL 2015    PAP NIL 2013           Past Medical History:   Diagnosis Date     Personal history of tuberculosis 2004    Finished on medications for 1 year in 2004       Past Surgical History:   Procedure Laterality Date     None         Family History documented in the data base    Medications:    Current Outpatient Prescriptions:      paragard intrauterine copper, 1 each by Intrauterine route once, Disp: , Rfl:     Allergies:  Review of patient's allergies indicates no known allergies.    ROS: see HPI    EXAM:  /66 (BP Location: Right arm, Patient Position: Chair, Cuff Size: Adult Regular)  Temp 98  F (36.7  C) (Oral)  Ht 5' 7.75\" (1.721 m)  Wt 145 lb 3.2 oz (65.9 kg)  LMP 2017 (Approximate)  Breastfeeding? No  BMI 22.24 kg/m2  Appearance: in no apparent distress     Pelvic:  EG - normal adult female.  BUS - within normal limits.  Vagina - well rugated, moderate discharge.  Cervix - no lesions, no CMT. IUD strings noted Uterus - firm, normal size and slightly tender.  Adnexae - no masses or tenderness.  RV - deferred.    Procedure:  After verbal consent was obtained from the patient, IUD strings were grasped with ring forcep and IUD easily removed intact with minimal patient discomfort noted.  No bleeding noted.      Results for orders placed or performed in visit on 17   UA reflex to Microscopic and Culture   Result Value Ref Range    " Color Urine Yellow     Appearance Urine Clear     Glucose Urine Negative NEG mg/dL    Bilirubin Urine Negative NEG    Ketones Urine Negative NEG mg/dL    Specific Gravity Urine 1.020 1.003 - 1.035    Blood Urine Trace (A) NEG    pH Urine 6.5 5.0 - 7.0 pH    Protein Albumin Urine Negative NEG mg/dL    Urobilinogen Urine 0.2 0.2 - 1.0 EU/dL    Nitrite Urine Negative NEG    Leukocyte Esterase Urine Small (A) NEG    Source Midstream Urine    Urine Microscopic   Result Value Ref Range    WBC Urine 2-5 (A) 0 - 2 /HPF    RBC Urine O - 2 0 - 2 /HPF    Bacteria Urine Few (A) NEG /HPF   Beta HCG qual IFA urine   Result Value Ref Range    Beta HCG Qual IFA Urine Negative NEG   Wet prep   Result Value Ref Range    Specimen Description Vagina     Wet Prep (A)      Clue cells seen  No yeast seen  No Trichomonas seen      Micro Report Status FINAL 04/05/2017         ASSESSMENT:  Encounter Diagnoses   Name Primary?     Dysuria Yes     Urinary frequency      Vaginal discharge      Encounter for IUD removal      Metrorrhagia         PLAN:   (R30.0) Dysuria  (primary encounter diagnosis)  Comment:   Plan: UA reflex to Microscopic and Culture, Urine         Microscopic             (R35.0) Urinary frequency  Comment:    Plan: Beta HCG qual IFA urine           (N89.8) Vaginal discharge  Comment:  Wet prep --> clue cells  Plan:  See rx              (Z30.432) Encounter for IUD removal  Comment: discussed alternative contraception since she does not really like the IUD  Plan: REMOVE INTRAUTERINE DEVICE      For now will use condoms until she feels better than will go on the pill after a couple months.     (N92.1) Metrorrhagia  Comment: likely related to the paragard.  Plan: wants out today      Patient will send a NightOwl message when she is ready to start oral contraceptive pills and will need to come to clinic for a upt. Will not need a doc appt.     Madiha Poon MD

## 2017-05-10 ENCOUNTER — OFFICE VISIT (OUTPATIENT)
Dept: FAMILY MEDICINE | Facility: CLINIC | Age: 32
End: 2017-05-10
Payer: COMMERCIAL

## 2017-05-10 VITALS
SYSTOLIC BLOOD PRESSURE: 112 MMHG | HEIGHT: 68 IN | DIASTOLIC BLOOD PRESSURE: 74 MMHG | BODY MASS INDEX: 22.04 KG/M2 | WEIGHT: 145.4 LBS | TEMPERATURE: 98.8 F | HEART RATE: 111 BPM | OXYGEN SATURATION: 99 %

## 2017-05-10 DIAGNOSIS — N39.0 URINARY TRACT INFECTION WITH HEMATURIA, SITE UNSPECIFIED: Primary | ICD-10-CM

## 2017-05-10 DIAGNOSIS — R35.0 URINARY FREQUENCY: ICD-10-CM

## 2017-05-10 DIAGNOSIS — N89.8 VAGINAL DISCHARGE: ICD-10-CM

## 2017-05-10 DIAGNOSIS — R31.9 URINARY TRACT INFECTION WITH HEMATURIA, SITE UNSPECIFIED: Primary | ICD-10-CM

## 2017-05-10 LAB
ALBUMIN UR-MCNC: ABNORMAL MG/DL
APPEARANCE UR: CLEAR
BACTERIA #/AREA URNS HPF: ABNORMAL /HPF
BETA HCG QUAL IFA URINE: NEGATIVE
BILIRUB UR QL STRIP: NEGATIVE
COLOR UR AUTO: YELLOW
GLUCOSE UR STRIP-MCNC: NEGATIVE MG/DL
HGB UR QL STRIP: ABNORMAL
KETONES UR STRIP-MCNC: NEGATIVE MG/DL
LEUKOCYTE ESTERASE UR QL STRIP: ABNORMAL
MICRO REPORT STATUS: NORMAL
NITRATE UR QL: NEGATIVE
NON-SQ EPI CELLS #/AREA URNS LPF: ABNORMAL /LPF
PH UR STRIP: 6 PH (ref 5–7)
RBC #/AREA URNS AUTO: ABNORMAL /HPF (ref 0–2)
SP GR UR STRIP: 1.02 (ref 1–1.03)
SPECIMEN SOURCE: NORMAL
URN SPEC COLLECT METH UR: ABNORMAL
UROBILINOGEN UR STRIP-ACNC: 0.2 EU/DL (ref 0.2–1)
WBC #/AREA URNS AUTO: ABNORMAL /HPF (ref 0–2)
WET PREP SPEC: NORMAL

## 2017-05-10 PROCEDURE — 81001 URINALYSIS AUTO W/SCOPE: CPT | Performed by: FAMILY MEDICINE

## 2017-05-10 PROCEDURE — 99213 OFFICE O/P EST LOW 20 MIN: CPT | Performed by: FAMILY MEDICINE

## 2017-05-10 PROCEDURE — 84703 CHORIONIC GONADOTROPIN ASSAY: CPT | Performed by: FAMILY MEDICINE

## 2017-05-10 PROCEDURE — 87210 SMEAR WET MOUNT SALINE/INK: CPT | Performed by: FAMILY MEDICINE

## 2017-05-10 PROCEDURE — 87086 URINE CULTURE/COLONY COUNT: CPT | Performed by: FAMILY MEDICINE

## 2017-05-10 PROCEDURE — 87088 URINE BACTERIA CULTURE: CPT | Performed by: FAMILY MEDICINE

## 2017-05-10 PROCEDURE — 87186 SC STD MICRODIL/AGAR DIL: CPT | Performed by: FAMILY MEDICINE

## 2017-05-10 RX ORDER — CIPROFLOXACIN 500 MG/1
500 TABLET, FILM COATED ORAL 2 TIMES DAILY
Qty: 14 TABLET | Refills: 0 | Status: SHIPPED | OUTPATIENT
Start: 2017-05-10 | End: 2017-12-20

## 2017-05-10 NOTE — PATIENT INSTRUCTIONS
"   * BLADDER INFECTION,Female (Adult)    A bladder infection (\"cystitis\" or \"UTI\") usually causes a constant urge to urinate and a burning when passing urine. Urine may be cloudy, smelly or dark. There may be pain in the lower abdomen. A bladder infection occurs when bacteria from the vaginal area enter the bladder opening (urethra). This can occur from sexual intercourse, wearing tight clothing, dehydration and other factors.  HOME CARE:  1. Drink lots of fluids (at least 6-8 glasses a day, unless you must restrict fluids for other medical reasons). This will force the medicine into your urinary system and flush the bacteria out of your body. Cranberry juice has been shown to help clear out the bacteria.  2. Avoid sexual intercourse until your symptoms are gone.  3. A bladder infection is treated with antibiotics. You may also be given Pyridium (generic = phenazopyridine) to reduce the burning sensation. This medicine will cause your urine to become a bright orange color. The orange urine may stain clothing. You may wear a pad or panty-liner to protect clothing.  PREVENTING FUTURE INFECTIONS:  1. Always wipe from front to back after a bowel movement.  2. Keep the genital area clean and dry.  3. Drink plenty of fluids each day to avoid dehydration.  4. Urinate right after intercourse to flush out the bladder.  5. Wear cotton underwear and cotton-lined panty hose; avoid tight-fitting pants.  6. If you are on birth control pills and are having frequent bladder infections, discuss with your doctor.  FOLLOW UP: Return to this facility or see your doctor if ALL symptoms are not gone after three days of treatment.  GET PROMPT MEDICAL ATTENTION if any of the following occur:    Fever over 101 F (38.3 C)    No improvement by the third day of treatment    Increasing back or abdominal pain    Repeated vomiting; unable to keep medicine down    Weakness, dizziness or fainting    Vaginal discharge    Pain, redness or swelling in " the labia (outer vaginal area)    6364-5821 [employer]., [address] [place] [address], [place] [address]. All rights reserved. This information is not intended as a substitute for professional medical care. Always follow your healthcare professional's instructions.    [facility]    If you have any questions regarding to your visit please contact your care team:       Team Purple:   Clinic Hours Telephone Number   [address] Dr.   7am-7pm  Monday - Thursday   7am-5pm  Fridays  [phone]  (Appointment scheduling available 24/7)    Questions about your Visit?   Team Line:  [phone]   Urgent Care - [place] and [place] - 11am-9pm Monday-Friday Saturday-Sunday- 9am-5pm   [place] - 5pm-9pm Monday-Friday Saturday-Sunday- 9am-5pm  [phone] - [place]   [phone] - [place]       What options do I have for visits at the clinic other than the traditional office visit?  To expand how we care for you, many of our providers are utilizing electronic visits (e-visits) and telephone visits, when medically appropriate, for interactions with their patients rather than a visit in the clinic.   We also offer nurse visits for many medical concerns. Just like any other service, we will bill your insurance company for this type of visit based on time spent on the phone with your provider. Not all insurance companies cover these visits. Please check with your medical insurance if this type of visit is covered. You will be responsible for any charges that are not paid by your insurance.      E-visits via [email or web address]:  generally incur a $35.00 fee.  Telephone visits:  Time spent on the phone: *charged based on time that is spent on the phone in increments of 10 minutes. Estimated cost:   5-10 mins $30.00   11-20 mins. $59.00   21-30 mins. $85.00     Use [email or web address] (secure email communication and access to your chart) to send your  primary care provider a message or make an appointment. Ask someone on your Team how to sign up for Automattichart.  For a Price Quote for your services, please call our Consumer Price Line at [phone].  As always, Thank you for trusting us with your health care needs!    Discharged By: Alcira

## 2017-05-10 NOTE — NURSING NOTE
"Chief Complaint   Patient presents with     Urinary Frequency     x 1 week      Vaginal Discharge     x 2-3 days       Initial /74  Pulse 111  Temp 98.8  F (37.1  C) (Oral)  Ht 5' 7.75\" (1.721 m)  Wt 145 lb 6.4 oz (66 kg)  LMP 04/21/2017 (Exact Date)  SpO2 99%  BMI 22.27 kg/m2 Estimated body mass index is 22.27 kg/(m^2) as calculated from the following:    Height as of this encounter: 5' 7.75\" (1.721 m).    Weight as of this encounter: 145 lb 6.4 oz (66 kg).  Medication Reconciliation: complete     An LORETTA Archuleta    "

## 2017-05-10 NOTE — MR AVS SNAPSHOT
"              After Visit Summary   5/10/2017    Adrian Gomez    MRN: [id number]           Patient Information     Date Of Birth          1985        Visit Information        Provider Department      5/10/2017 7:20 AM Law Ruiz MD [facility]        Today's Diagnoses     Urinary tract infection with hematuria, site unspecified    -  1    Urinary frequency        Vaginal discharge          Care Instructions       * BLADDER INFECTION,Female (Adult)    A bladder infection (\"cystitis\" or \"UTI\") usually causes a constant urge to urinate and a burning when passing urine. Urine may be cloudy, smelly or dark. There may be pain in the lower abdomen. A bladder infection occurs when bacteria from the vaginal area enter the bladder opening (urethra). This can occur from sexual intercourse, wearing tight clothing, dehydration and other factors.  HOME CARE:  1. Drink lots of fluids (at least 6-8 glasses a day, unless you must restrict fluids for other medical reasons). This will force the medicine into your urinary system and flush the bacteria out of your body. Cranberry juice has been shown to help clear out the bacteria.  2. Avoid sexual intercourse until your symptoms are gone.  3. A bladder infection is treated with antibiotics. You may also be given Pyridium (generic = phenazopyridine) to reduce the burning sensation. This medicine will cause your urine to become a bright orange color. The orange urine may stain clothing. You may wear a pad or panty-liner to protect clothing.  PREVENTING FUTURE INFECTIONS:  1. Always wipe from front to back after a bowel movement.  2. Keep the genital area clean and dry.  3. Drink plenty of fluids each day to avoid dehydration.  4. Urinate right after intercourse to flush out the bladder.  5. Wear cotton underwear and cotton-lined panty hose; avoid tight-fitting pants.  6. If you are on birth control pills and are having frequent bladder infections, discuss " with your doctor.  FOLLOW UP: Return to this facility or see your doctor if ALL symptoms are not gone after three days of treatment.  GET PROMPT MEDICAL ATTENTION if any of the following occur:    Fever over 101 F (38.3 C)    No improvement by the third day of treatment    Increasing back or abdominal pain    Repeated vomiting; unable to keep medicine down    Weakness, dizziness or fainting    Vaginal discharge    Pain, redness or swelling in the labia (outer vaginal area)    4915-8051 [employer], [address] [place] [address], [place] [address]. All rights reserved. This information is not intended as a substitute for professional medical care. Always follow your healthcare professional's instructions.    [facility]    If you have any questions regarding to your visit please contact your care team:       Team Purple:   Clinic Hours Telephone Number   [address] Dr.   7am-7pm  Monday - Thursday   7am-5pm  Fridays  [phone]  (Appointment scheduling available 24/7)    Questions about your Visit?   Team Line:  [phone]   Urgent Care - [place] [facility] - 11am-9pm Monday-Friday Saturday-Sunday- 9am-5pm   [place] - 5pm-9pm Monday-Friday Saturday-Sunday- 9am-5pm  [phone] - [place]   [phone] - [place]       What options do I have for visits at the clinic other than the traditional office visit?  To expand how we care for you, many of our providers are utilizing electronic visits (e-visits) and telephone visits, when medically appropriate, for interactions with their patients rather than a visit in the clinic.   We also offer nurse visits for many medical concerns. Just like any other service, we will bill your insurance company for this type of visit based on time spent on the phone with your provider. Not all insurance companies cover these visits. Please check with your medical insurance if this  type of visit is covered. You will be responsible for any charges that are not paid by your insurance.      E-visits via Luxanovahart:  generally incur a $35.00 fee.  Telephone visits:  Time spent on the phone: *charged based on time that is spent on the phone in increments of 10 minutes. Estimated cost:   5-10 mins $30.00   11-20 mins. $59.00   21-30 mins. $85.00     Use Luxanovahart (secure email communication and access to your chart) to send your primary care provider a message or make an appointment. Ask someone on your Team how to sign up for Zelgort.  For a Price Quote for your services, please call our Pay-Me Line at [phone].  As always, Thank you for trusting us with your health care needs!    Discharged By: An          Follow-ups after your visit        Who to contact     If you have questions or need follow up information about today's clinic visit or your schedule please contact [facility] directly at [phone].  Normal or non-critical lab and imaging results will be communicated to you by Luxanovahart, letter or phone within 4 business days after the clinic has received the results. If you do not hear from us within 7 days, please contact the clinic through Zelgort or phone. If you have a critical or abnormal lab result, we will notify you by phone as soon as possible.  Submit refill requests through STAT-Diagnostica or call your pharmacy and they will forward the refill request to us. Please allow 3 business days for your refill to be completed.          Additional Information About Your Visit        STAT-Diagnostica Information     STAT-Diagnostica gives you secure access to your electronic health record. If you see a primary care provider, you can also send messages to your care team and make appointments. If you have questions, please call your primary care clinic.  If you do not have a primary care provider, please call [phone] and they will assist you.        Care EveryWhere ID     This is your Care  "EveryWhere ID. This could be used by other organizations to access your [facility] medical records  [id number]        Your Vitals Were     Pulse Temperature Height Last Period Pulse Oximetry BMI (Body Mass Index)    111 98.8  F (37.1  C) (Oral) 5' 7.75\" (1.721 m) 04/21/2017 (Exact Date) 99% 22.27 kg/m2       Blood Pressure from Last 3 Encounters:   05/10/17 112/74   04/05/17 112/66   02/28/17 110/70    Weight from Last 3 Encounters:   05/10/17 145 lb 6.4 oz (66 kg)   04/05/17 145 lb 3.2 oz (65.9 kg)   02/28/17 146 lb (66.2 kg)              We Performed the Following     Beta HCG qual IFA urine     UA reflex to Microscopic and Culture     Urine Culture Aerobic Bacterial     Urine Microscopic     Wet prep          Today's Medication Changes          These changes are accurate as of: 5/10/17  8:14 AM.  If you have any questions, ask your nurse or doctor.               Start taking these medicines.        Dose/Directions    ciprofloxacin 500 MG tablet   Commonly known as:  CIPRO   Used for:  Urinary tract infection with hematuria, site unspecified   Started by:  Law Ruiz MD        Dose:  500 mg   Take 1 tablet (500 mg) by mouth 2 times daily   Quantity:  14 tablet   Refills:  0            Where to get your medicines      These medications were sent to [facility] [place]  [place] - [address]  [address], [facility] [address]     Phone:  [phone]     ciprofloxacin 500 MG tablet                Primary Care Provider    None Specified       No primary provider on file.        Thank you!     Thank you for choosing [facility]  for your care. Our goal is always to provide you with excellent care. Hearing back from our patients is one way we can continue to improve our services. Please take a few minutes to complete the written survey that you may receive in the mail after your visit with us. Thank you!             Your Updated Medication List - Protect " others around you: Learn how to safely use, store and throw away your medicines at www.disposemymeds.org.          This list is accurate as of: 5/10/17  8:14 AM.  Always use your most recent med list.                   Brand Name Dispense Instructions for use    ciprofloxacin 500 MG tablet    CIPRO    14 tablet    Take 1 tablet (500 mg) by mouth 2 times daily       metroNIDAZOLE 500 MG tablet    FLAGYL    14 tablet    Take 1 tablet (500 mg) by mouth 2 times daily       paragard intrauterine copper      1 each by Intrauterine route once Reported on 5/10/2017

## 2017-05-10 NOTE — PROGRESS NOTES
SUBJECTIVE:                                                    Adrian Goemz is a 32 year old female who presents to clinic today for the following health issues:    URINARY TRACT SYMPTOMS      Duration: 1 week    Description  frequency, back pain and flank pain     Intensity:  mild    Accompanying signs and symptoms:  Fever/chills: YES- had a fever yesterday  Flank pain YES- left side   Nausea and vomiting: Nausea   Vaginal symptoms: discharge  Abdominal/Pelvic Pain: no     History  History of frequent UTI's: YES  History of kidney stones: no   Sexually Active: YES  Possibility of pregnancy: No    Precipitating or alleviating factors: None    Therapies tried and outcome: none   Outcome: none   Frequency x 1 week  Left amol pain x 2 days  Fever yesterday.    LMP: 4/21/2015  Contraceptions: Uses condoms. Had IUD and had it taken out.    Problem list and histories reviewed & adjusted, as indicated.  Additional history: as documented    Patient Active Problem List   Diagnosis     CARDIOVASCULAR SCREENING; LDL GOAL LESS THAN 160     Personal history of tuberculosis     Acute cystitis without hematuria     Past Surgical History:   Procedure Laterality Date     None         Social History   Substance Use Topics     Smoking status: Never Smoker     Smokeless tobacco: Never Used     Alcohol use Yes      Comment: 2 drinks a month     Family History   Problem Relation Age of Onset     Family History Negative Mother      Breast Cancer Mother      Family History Negative Father      HEART DISEASE Maternal Grandmother      HEART DISEASE Maternal Grandfather      HEART DISEASE Paternal Grandmother      HEART DISEASE Paternal Grandfather      DIABETES No family hx of      Hypertension No family hx of            Reviewed and updated as needed this visit by clinical staff  Tobacco  Allergies  Meds  Med Hx  Surg Hx  Fam Hx  Soc Hx      Reviewed and updated as needed this visit by Provider         ROS:  Constitutional, HEENT,  "cardiovascular, pulmonary, gi and gu systems are negative, except as otherwise noted.    OBJECTIVE:                                                    /74  Pulse 111  Temp 98.8  F (37.1  C) (Oral)  Ht 5' 7.75\" (1.721 m)  Wt 145 lb 6.4 oz (66 kg)  LMP 04/21/2017 (Exact Date)  SpO2 99%  BMI 22.27 kg/m2  Body mass index is 22.27 kg/(m^2).  GENERAL: healthy, alert and no distress  NECK: no adenopathy and thyroid normal to palpation  RESP: lungs clear to auscultation - no rales, rhonchi or wheezes  CV: regular rate and rhythm, no murmur, click or rub, no peripheral edema   BACK: Tenderness mid flank on left side. CVA tenderness equivocal.  ABDOMEN: soft, nontender,  no masses and bowel sounds normal  MS: no gross musculoskeletal defects noted, no edema    Labs:     Results for orders placed or performed in visit on 05/10/17   UA reflex to Microscopic and Culture   Result Value Ref Range    Color Urine Yellow     Appearance Urine Clear     Glucose Urine Negative NEG mg/dL    Bilirubin Urine Negative NEG    Ketones Urine Negative NEG mg/dL    Specific Gravity Urine 1.020 1.003 - 1.035    Blood Urine Moderate (A) NEG    pH Urine 6.0 5.0 - 7.0 pH    Protein Albumin Urine Trace (A) NEG mg/dL    Urobilinogen Urine 0.2 0.2 - 1.0 EU/dL    Nitrite Urine Negative NEG    Leukocyte Esterase Urine Large (A) NEG    Source Midstream Urine    Beta HCG qual IFA urine   Result Value Ref Range    Beta HCG Qual IFA Urine Negative NEG   Urine Microscopic   Result Value Ref Range    WBC Urine 10-25 (A) 0 - 2 /HPF    RBC Urine 2-5 (A) 0 - 2 /HPF    Squamous Epithelial /LPF Urine Few FEW /LPF    Bacteria Urine Few (A) NEG /HPF   Wet prep   Result Value Ref Range    Specimen Description Vagina     Wet Prep       No Trichomonas seen  No yeast seen  No clue cells seen      Micro Report Status FINAL 05/10/2017           ASSESSMENT/PLAN:                                                    (N39.0,  R31.9) Urinary tract infection with " hematuria, site unspecified  (primary encounter diagnosis)  Comment: Risk for recurrent symptoms including infrequent voiding, stool or urine holding, constipation, chemical irritation, incomplete voiding discussed.  Need for follow up with any further symptoms reviewed. Keep area open to air, avoid wet/tight clothing.  CX pending.  Follow up prn.  Plan: ciprofloxacin (CIPRO) 500 MG tablet    (R35.0) Urinary frequency  Comment: UA consistent with UTI due to flank pain will treat for pyelonephritis  Plan: UA reflex to Microscopic and Culture, Beta HCG         qual IFA urine, Urine Microscopic, Urine         Culture Aerobic Bacterial    (N89.8) Vaginal discharge  Comment: Wet prep negative  Plan: Wet prep    Call or return to clinic prn if these symptoms worsen or fail to improve as anticipated in 1 week    Law Ruiz MD  [facility]

## 2017-05-12 LAB
BACTERIA SPEC CULT: ABNORMAL
MICRO REPORT STATUS: ABNORMAL
MICROORGANISM SPEC CULT: ABNORMAL
SPECIMEN SOURCE: ABNORMAL

## 2017-08-29 ENCOUNTER — OFFICE VISIT (OUTPATIENT)
Dept: FAMILY MEDICINE | Facility: CLINIC | Age: 32
End: 2017-08-29
Payer: COMMERCIAL

## 2017-08-29 DIAGNOSIS — L63.9 ALOPECIA AREATA: ICD-10-CM

## 2017-08-29 DIAGNOSIS — L65.9 HAIR LOSS: Primary | ICD-10-CM

## 2017-08-29 PROCEDURE — 99214 OFFICE O/P EST MOD 30 MIN: CPT | Mod: 25 | Performed by: FAMILY MEDICINE

## 2017-08-29 PROCEDURE — 36415 COLL VENOUS BLD VENIPUNCTURE: CPT | Performed by: FAMILY MEDICINE

## 2017-08-29 PROCEDURE — 80053 COMPREHEN METABOLIC PANEL: CPT | Performed by: FAMILY MEDICINE

## 2017-08-29 PROCEDURE — 86038 ANTINUCLEAR ANTIBODIES: CPT | Performed by: FAMILY MEDICINE

## 2017-08-29 PROCEDURE — 84443 ASSAY THYROID STIM HORMONE: CPT | Performed by: FAMILY MEDICINE

## 2017-08-29 PROCEDURE — 11900 INJECT SKIN LESIONS </W 7: CPT | Performed by: FAMILY MEDICINE

## 2017-08-29 NOTE — MR AVS SNAPSHOT
After Visit Summary   8/29/2017    Adrian Gomez    MRN: [id number]           Patient Information     Date Of Birth          1985        Visit Information        Provider Department      8/29/2017 2:40 PM Jessenia Ladd MD [facility] Primary [facility] Skin        Today's Diagnoses     Hair loss    -  1    Alopecia areata          Care Instructions    FUTURE APPOINTMENTS  Follow up in 4-6 weeks.          Follow-ups after your visit        Your next 10 appointments already scheduled     Aug 30, 2017  1:00 PM CDT   New Visit with Bill Quezada MD   [facility] ([facility]    [address] [place] [id number] [phone]              Who to contact     If you have questions or need follow up information about today's clinic visit or your schedule please contact [facility] directly at [phone].  Normal or non-critical lab and imaging results will be communicated to you by MyChart, letter or phone within 4 business days after the clinic has received the results. If you do not hear from us within 7 days, please contact the clinic through Saiseihart or phone. If you have a critical or abnormal lab result, we will notify you by phone as soon as possible.  Submit refill requests through Vericept or call your pharmacy and they will forward the refill request to us. Please allow 3 business days for your refill to be completed.          Additional Information About Your Visit        MyChart Information     Ver[facility]pt gives you secure access to your electronic health record. If you see a primary care provider, you can also send messages to your care team and make appointments. If you have questions, please call your primary care clinic.  If you do not have a primary care provider, please call [phone] and they will assist you.        Care EveryWhere ID     This is your Care EveryWhere ID. This could be used by other  organizations to access your [place] medical records  [id number]         Blood Pressure from Last 3 Encounters:   05/10/17 112/74   04/05/17 112/66   02/28/17 110/70    Weight from Last 3 Encounters:   05/10/17 145 lb 6.4 oz (66 kg)   04/05/17 145 lb 3.2 oz (65.9 kg)   02/28/17 146 lb (66.2 kg)              We Performed the Following     Anti Nuclear Yulissa IgG by IFA with Reflex     Comprehensive metabolic panel     INJECTION INTO SKIN LESIONS <=7     TRIAMCINOLONE ACET INJ NOS     TSH        Primary Care Provider    No Ref-Primary Verified       No address on file        Equal Access to Services     DAISY CAICEDO : Hadii jaylene Arevalo, ariadne hernandez, scarlett moarnmajesus martinez, malia donahue . So [facility] [phone].    ATENCIÓN: Si habla español, tiene a wagner disposición servicios gratuitos de asistencia lingüística. Llame al [phone].    We comply with applicable federal civil rights laws and [place] laws. We do not discriminate on the basis of race, color, national origin, age, disability sex, sexual orientation or gender identity.            Thank you!     Thank you for choosing [facility] PRIMARY C[facility] SK[facility]  for your care. Our goal is always to provide you with excellent care. Hearing back from our patients is one way we can continue to improve our services. Please take a few minutes to complete the written survey that you may receive in the mail after your visit with us. Thank you!             Your Updated Medication List - Protect others around you: Learn how to safely use, store and throw away your medicines at www.disposemymeds.org.          This list is accurate as of: 8/29/17  3:05 PM.  Always use your most recent med list.                   Brand Name Dispense Instructions for use Diagnosis    ciprofloxacin 500 MG tablet    CIPRO    14 tablet    Take 1 tablet (500 mg) by mouth 2 times daily    Urinary tract infection with hematuria, site unspecified        metroNIDAZOLE 500 MG tablet    FLAGYL    14 tablet    Take 1 tablet (500 mg) by mouth 2 times daily    BV (bacterial vaginosis)       paragard intrauterine copper      1 each by Intrauterine route once Reported on 5/10/2017

## 2017-08-29 NOTE — PROGRESS NOTES
[facility] - PRIMARY CARE SKIN    CC : Hair loss   SUBJECTIVE:                                                    Adrian Gomez is a 32 year old female who is referred by Dr. Justus Carson  because of a patch of hair loss on the left scalp that was first noticed 1 month ago.    She reports that recent HGB, CBC were normal. Adrian accessed her recent lab work on her mobile phone:  Iron 49  Ferritin 20.2  Total iron binding capacity 387    Onset : 1 month ago.  Symptoms include : patchy hair loss.  Denies these symptoms : scalp itch and scalp tenderness.    Tension hair styles : YES - she always wears hair in a ponytail.  Excessive weight loss : NO.  Recent serious medical illness : NO.    Products used : She has noticed fine, white hair growth with use of antifungal shampoo.    She recalls a fungal infection at age 8-9 during 3rd grade, for which treatment included shaving all of the hair on the scalp.    Personal Medical History  Skin Cancer : NO  Eczema Psoriasis Rosacea Autoimmune   NO NO NO NO     Family Medical History  Connective tissue disease : NO  Thyroid disease : NO  Hair Loss : She also reports hair loss in her niece at similar time, and the patient is worried about possible infection.  Skin Cancer : NO  Eczema Psoriasis Rosacea Autoimmune   NO NO NO NO     Occupation : lab work (indoor).    Patient Active Problem List   Diagnosis     CARDIOVASCULAR SCREENING; LDL GOAL LESS THAN 160     Personal history of tuberculosis     Acute cystitis without hematuria       Past Medical History:   Diagnosis Date     Personal history of tuberculosis 2004    Finished on medications for 1 year in 6/2004    Past Surgical History:   Procedure Laterality Date     None        Social History   Substance Use Topics     Smoking status: Never Smoker     Smokeless tobacco: Never Used     Alcohol use Yes      Comment: 2 drinks a month    Family History     Problem (# of Occurrences) Relation (Name,Age of Onset)    Breast Cancer  (1) Mother    Family History Negative (2) Mother, Father    HEART DISEASE (4) Maternal Grandmother, Maternal Grandfather, Paternal Grandmother, Paternal Grandfather       Negative family history of: DIABETES, Hypertension           Prescription Medications as of 8/29/2017             ciprofloxacin (CIPRO) 500 MG tablet Take 1 tablet (500 mg) by mouth 2 times daily    metroNIDAZOLE (FLAGYL) 500 MG tablet Take 1 tablet (500 mg) by mouth 2 times daily    paragard intrauterine copper 1 each by Intrauterine route once Reported on 5/10/2017          No Known Allergies     CONSTITUTIONAL:NEGATIVE for fever, chills, change in weight  INTEGUMENTARY/SKIN: POSITIVE for hair loss  ENDOCRINE: POSITIVE  for hair loss  ROS : 14 point review of systems was negative except the symptoms listed above in the HPI.    This document serves as a record of the services and decisions personally performed and made by Carol Ladd MD. It was created on her behalf by Giovanny Sol, a trained medical scribe.  The creation of this document is based on the scribe's personal observations and the provider's statements to the medical scribe.  Giovanny Sol, August 29, 2017 2:41 PM      OBJECTIVE:                                                    GENERAL: healthy, alert and no distress  ENDOCRINE : Thyroid : No thyromegaly.  SKIN: [place] Skin Type - V.  Scalp and Face were examined. The dermatoscope was used to help evaluate pigmented lesions.  Skin Pertinent Findings:  Pattern of loss : 35 mm x 35 mm circular patch of hair loss left frontal scalp.  Hair condition : normal.  Scalp condition : Normal, no scaling, no erythema.  Incr. hair w/ gentle pull : NO.  Excess hair : NO.    Eyebrow hair is normal in appearance.    Name : Triamcinolone acetonide (Kenalog) injection.  Indication : Alopecia Areata - injection to promote hair growth in affected areas.  Location(s) : left frontal scalp.  Completed by : Carol Ladd MD  Note : Prior to the procedure,  we discussed possible hypo- and hyperpigmentation or depression of the skin post-procedure. Explained that more then one injection may be required, that these injections are distributed 4-6 weeks apart, and that up to a total of six injections may be needed.    Skin was cleansed with alcohol. 0.3 mL triamcinolone acetonide 10 mg/mL mixed with 0.3 mL of Lidocaine 1% plain. 3 mg/0.6 mL  Injected in 0.1 mL aliquots in the area of alopecia.  Total injected : 0.3 mL= 1.5 mg.  Lot # : [id number]. Expiration Date : 8/2018.    Blanching was present during the injection. Minimal bleeding occurred. Patient left in satisfactory condition.  Treatment number : 1.    Diagnostic Test Results:  No results found for this or any previous visit (from the past 24 hour(s)).  Labs Pending : TARYN, CMP, TSH.          ASSESSMENT:                                                      Encounter Diagnoses   Name Primary?     Hair loss Yes     Alopecia areata      Comment : alopecia areata.      PLAN:                                                    Patient Instructions   FUTURE APPOINTMENTS  Follow up in 4-6 weeks.        PROCEDURES:                                                    None.    TT : 20 minutes.  CT : 15 minutes.      The information in this document, created by the medical scribe for me, accurately reflects the services I personally performed and the decisions made by me. I have reviewed and approved this document for accuracy prior to leaving the patient care area.  Carol Ladd MD August 29, 2017 2:41 PM  [facility] - PRIMARY CARE SKIN

## 2017-08-30 ENCOUNTER — OFFICE VISIT (OUTPATIENT)
Dept: OTOLARYNGOLOGY | Facility: CLINIC | Age: 32
End: 2017-08-30
Payer: COMMERCIAL

## 2017-08-30 VITALS — WEIGHT: 147.2 LBS | TEMPERATURE: 97.6 F | BODY MASS INDEX: 22.31 KG/M2 | HEIGHT: 68 IN | RESPIRATION RATE: 16 BRPM

## 2017-08-30 DIAGNOSIS — J35.8 TONSILLAR CYST: Primary | ICD-10-CM

## 2017-08-30 LAB
ALBUMIN SERPL-MCNC: 4.1 G/DL (ref 3.4–5)
ALP SERPL-CCNC: 59 U/L (ref 40–150)
ALT SERPL W P-5'-P-CCNC: 19 U/L (ref 0–50)
ANION GAP SERPL CALCULATED.3IONS-SCNC: 9 MMOL/L (ref 3–14)
AST SERPL W P-5'-P-CCNC: 21 U/L (ref 0–45)
BILIRUB SERPL-MCNC: 0.4 MG/DL (ref 0.2–1.3)
BUN SERPL-MCNC: 13 MG/DL (ref 7–30)
CALCIUM SERPL-MCNC: 9 MG/DL (ref 8.5–10.1)
CHLORIDE SERPL-SCNC: 104 MMOL/L (ref 94–109)
CO2 SERPL-SCNC: 26 MMOL/L (ref 20–32)
CREAT SERPL-MCNC: 0.52 MG/DL (ref 0.52–1.04)
GFR SERPL CREATININE-BSD FRML MDRD: >90 ML/MIN/1.7M2
GLUCOSE SERPL-MCNC: 78 MG/DL (ref 70–99)
POTASSIUM SERPL-SCNC: 3.7 MMOL/L (ref 3.4–5.3)
PROT SERPL-MCNC: 7.8 G/DL (ref 6.8–8.8)
SODIUM SERPL-SCNC: 139 MMOL/L (ref 133–144)
TSH SERPL DL<=0.005 MIU/L-ACNC: 1.36 MU/L (ref 0.4–4)

## 2017-08-30 PROCEDURE — 40808 BIOPSY OF MOUTH LESION: CPT | Performed by: OTOLARYNGOLOGY

## 2017-08-30 PROCEDURE — 99207 ZZC DROP WITH A PROCEDURE: CPT | Performed by: OTOLARYNGOLOGY

## 2017-08-30 NOTE — PROGRESS NOTES
Chief Complaint - sore in throat    History of Present Illness - Adrian Gomez is a 32 year old female who presents with a history of feeling like something is in the back of the throat since a couple weeks. She notes a white spot in back, questions a tonsil stone. White spot she cannot get out with q-tip near tonsil. Some bleeding with manipulation. Never had tonsil stones before. She feels she has allergies too, but the drainage improves with antihistamine. No recurrent tonsillitis.      Past Medical History -   Patient Active Problem List   Diagnosis     CARDIOVASCULAR SCREENING; LDL GOAL LESS THAN 160     Personal history of tuberculosis     Acute cystitis without hematuria       Current Medications -   Current Outpatient Prescriptions:      ciprofloxacin (CIPRO) 500 MG tablet, Take 1 tablet (500 mg) by mouth 2 times daily (Patient not taking: Reported on 8/29/2017), Disp: 14 tablet, Rfl: 0     metroNIDAZOLE (FLAGYL) 500 MG tablet, Take 1 tablet (500 mg) by mouth 2 times daily (Patient not taking: Reported on 5/10/2017), Disp: 14 tablet, Rfl: 0     paragard intrauterine copper, 1 each by Intrauterine route once Reported on 5/10/2017, Disp: , Rfl:     Allergies - No Known Allergies    Social History -   Social History     Social History     Marital status: Single     Spouse name: N/A     Number of children: N/A     Years of education: N/A     Occupational History     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 No     Social History Narrative       Family History -   Family History   Problem Relation Age of Onset     Family History Negative Mother      Breast Cancer Mother      Family History  "Negative Father      HEART DISEASE Maternal Grandmother      HEART DISEASE Maternal Grandfather      HEART DISEASE Paternal Grandmother      HEART DISEASE Paternal Grandfather      DIABETES No family hx of      Hypertension No family hx of        Review of Systems - As per HPI and PMHx, otherwise 7 system review of the head and neck is negative.    Physical Exam  Temp 97.6  F (36.4  C) (Oral)  Resp 16  Ht 1.721 m (5' 7.75\")  Wt 66.8 kg (147 lb 3.2 oz)  BMI 22.55 kg/m2=  General - The patient is in no distress. Alert and oriented to person and place, answers questions and cooperates with examination appropriately.   Voice and Breathing - The patient was breathing comfortably without the use of accessory muscles. There was no wheezing, stridor, or stertor.  The patients voice was clear and strong.  Eyes - Extraocular movements intact.  Sclera were not icteric or injected, conjunctiva were pink and moist.  Mouth - Examination of the oral cavity showed pink, healthy oral mucosa. No lesions or ulcerations noted.  The tongue was mobile and midline.  Throat - she has a 6-7 mm white, smooth, likely cyst left superior tonsil pole. No bleeding. No tonsil stones. Tonsils 1+ and endophytic.   Nose - External contour is symmetric, no gross deflection or scars.  Nasal mucosa is pink and moist with no abnormal mucus.  The septum was midline and non-obstructive, turbinates of normal size and position.  No polyps, masses, or purulence noted on examination.  Neck -  Palpation of the occipital, submental, submandibular, internal jugular chain, and supraclavicular nodes did not demonstrate any abnormal lymph nodes or masses. No parotid masses. Palpation of the thyroid was soft and smooth, with no nodules or goiter appreciated.  The trachea was mobile and midline.  Neurologic - CN II-XII are grossly intact, no focal neurologic deficits.     Procedure - oral biopsy    I had the patient gargle 4% plain topical lidocaine. I depressed " the tongue. I then used a straight blakesly and grabbed the left tonsil cyst. It's cavity was opened it purulent material extruded out. The cyst was effectively marsupialized. I had her gargle and suctioned this as well. Minimal bleeding stopped.       A/P - Adrian Gomez is a 32 year old female with a left tonsil cyst. I opened this and suctioned out the contents. She can eat a soft diet and gargle salt water until this heals. Return if she has more problems or recurrence.         Dr. Bill Quezada  Otolaryngology  [facility]

## 2017-08-30 NOTE — NURSING NOTE
"Chief Complaint   Patient presents with     Throat Problem     sores on back of throat       Initial Temp 97.6  F (36.4  C) (Oral)  Resp 16  Ht 1.721 m (5' 7.75\")  Wt 66.8 kg (147 lb 3.2 oz)  BMI 22.55 kg/m2 Estimated body mass index is 22.55 kg/(m^2) as calculated from the following:    Height as of this encounter: 1.721 m (5' 7.75\").    Weight as of this encounter: 66.8 kg (147 lb 3.2 oz).  Medication Reconciliation: complete   Ion Palacios CMA      "

## 2017-08-30 NOTE — PATIENT INSTRUCTIONS
General Scheduling Information  To schedule your CT/MRI scan, please contact [place] [facility] at [phone]   [address] [place] [address]    To schedule your Surgery, please contact our Specialty Schedulers at [phone]    ENT Clinic Locations Clinic Hours Telephone Number     [facility]  [address] [place] [address]   Tuesday:       8:00am -- 4:00pm    Wednesday:  8:00am - 4:00pm   To schedule an appointment with   Dr. Quezada,   please contact our   Specialty Scheduling Department at:     [phone]       [facility]  [address] [place] [address]   Friday:          8:00am - 4:00pm         Urgent Care Locations Clinic Hours Telephone Numbers     [facility]  [address] [place] [address]     Monday-Friday:     11:00pm - 9:00pm    Saturday-Sunday:  9:00am - 5:00pm   [phone]     [facility]  [address] [place] [address]     Monday-Friday:      5:00pm - 9:00pm     Saturday-Sunday:  9:00am - 5:00pm   [phone]

## 2017-08-30 NOTE — MR AVS SNAPSHOT
After Visit Summary   8/30/2017    Adrian Gomez    MRN: [id number]           Patient Information     Date Of Birth          1985        Visit Information        Provider Department      8/30/2017 1:00 PM Bill Quezada MD [facility]        Today's Diagnoses     Tonsillar cyst    -  1      Care Instructions    General Scheduling Information  To schedule your CT/MRI scan, please contact [facility] at [phone] [address] [place] [address]    To schedule your Surgery, please contact our Specialty Schedulers at [phone]    ENT Clinic Locations Clinic Hours Telephone Number     [facility]  [address]  [place] [address]   Tuesday:       8:00am -- 4:00pm    Wednesday:  8:00am - 4:00pm   To schedule an appointment with   Dr. Quezada,   please contact our   Specialty Scheduling Department at:     [phone]       [facility]  [address] [place] [address]   Friday:          8:00am - 4:00pm         Urgent Care Locations Clinic Hours Telephone Numbers     [facility]  [address]  [place] [address]     Monday-Friday:     11:00pm - 9:00pm    Saturday-Sunday:  9:00am - 5:00pm   [phone]     [facility]  [email or web address]   [place] [address]     Monday-Friday:      5:00pm - 9:00pm     Saturday-Sunday:  9:00am - 5:00pm   [phone]               Follow-ups after your visit        Your next 10 appointments already scheduled     Oct 03, 2017 10:00 AM CDT   Office Visit with Jessenia Ladd MD   [facility] - Primary Care Skin ([facility] )    [address] [place] [address]  [address] [place] [id number] [phone]           Bring a current list of meds and any records pertaining to this visit. For Physicals, please bring immunization records and any forms needing to be filled out. Please arrive 10 minutes early to complete paperwork.              Who to  "contact     If you have questions or need follow up information about today's clinic visit or your schedule please contact [facility] directly at [phone].  Normal or non-critical lab and imaging results will be communicated to you by MyChart, letter or phone within 4 business days after the clinic has received the results. If you do not hear from us within 7 days, please contact the clinic through [employer] or phone. If you have a critical or abnormal lab result, we will notify you by phone as soon as possible.  Submit refill requests through [employer] or call your pharmacy and they will forward the refill request to us. Please allow 3 business days for your refill to be completed.          Additional Information About Your Visit        [employer] Information     [employer] gives you secure access to your electronic health record. If you see a primary care provider, you can also send messages to your care team and make appointments. If you have questions, please call your primary care clinic.  If you do not have a primary care provider, please call [phone] and they will assist you.        Care EveryWhere ID     This is your Care EveryWhere ID. This could be used by other organizations to access your [place] medical records  [id number]        Your Vitals Were     Temperature Respirations Height BMI (Body Mass Index)          97.6  F (36.4  C) (Oral) 16 1.721 m (5' 7.75\") 22.55 kg/m2         Blood Pressure from Last 3 Encounters:   05/10/17 112/74   04/05/17 112/66   02/28/17 110/70    Weight from Last 3 Encounters:   08/30/17 66.8 kg (147 lb 3.2 oz)   05/10/17 66 kg (145 lb 6.4 oz)   04/05/17 65.9 kg (145 lb 3.2 oz)              We Performed the Following     Biopsy Mouth Inside        Primary Care Provider    No Ref-Primary Verified       No address on file        Equal Access to Services     DAISY ARREAGA: Sherry Arevalo, ariadne hernandez, scarlett martinez, malia contreras " hitesh barrettalejandrotrue thompsonBrendenaacolton ah. So [facility] [phone].    ATENCIÓN: Si seanla silvia, tiene a wagner disposición servicios gratuitos de asistencia lingüística. La al [phone].    We comply with applicable federal civil rights laws and [place] laws. We do not discriminate on the basis of race, color, national origin, age, disability sex, sexual orientation or gender identity.            Thank you!     Thank you for choosing [facility]  for your care. Our goal is always to provide you with excellent care. Hearing back from our patients is one way we can continue to improve our services. Please take a few minutes to complete the written survey that you may receive in the mail after your visit with us. Thank you!             Your Updated Medication List - Protect others around you: Learn how to safely use, store and throw away your medicines at www.disposemymeds.org.          This list is accurate as of: 8/30/17  1:41 PM.  Always use your most recent med list.                   Brand Name Dispense Instructions for use Diagnosis    ciprofloxacin 500 MG tablet    CIPRO    14 tablet    Take 1 tablet (500 mg) by mouth 2 times daily    Urinary tract infection with hematuria, site unspecified       metroNIDAZOLE 500 MG tablet    FLAGYL    14 tablet    Take 1 tablet (500 mg) by mouth 2 times daily    BV (bacterial vaginosis)       paragard intrauterine copper      1 each by Intrauterine route once Reported on 5/10/2017

## 2017-08-31 LAB — ANA SER QL IF: NEGATIVE

## 2017-10-04 ENCOUNTER — OFFICE VISIT (OUTPATIENT)
Dept: FAMILY MEDICINE | Facility: CLINIC | Age: 32
End: 2017-10-04
Payer: COMMERCIAL

## 2017-10-04 DIAGNOSIS — L63.9 AA (ALOPECIA AREATA): Primary | ICD-10-CM

## 2017-10-04 PROCEDURE — 11900 INJECT SKIN LESIONS </W 7: CPT | Performed by: FAMILY MEDICINE

## 2017-10-04 PROCEDURE — 99213 OFFICE O/P EST LOW 20 MIN: CPT | Mod: 25 | Performed by: FAMILY MEDICINE

## 2017-10-04 NOTE — MR AVS SNAPSHOT
After Visit Summary   10/4/2017    Adrian Gomez    MRN: [id number]           Patient Information     Date Of Birth          1985        Visit Information        Provider Department      10/4/2017 7:20 AM Jessenia Ladd MD [facility] Primary [facility] Skin        Today's Diagnoses     AA (alopecia areata)    -  1      Care Instructions    FUTURE APPOINTMENTS  Follow up in 4-6 week(s)            Follow-ups after your visit        Who to contact     If you have questions or need follow up information about today's clinic visit or your schedule please contact [facility] - PRIMARY CARE SKIN directly at [phone].  Normal or non-critical lab and imaging results will be communicated to you by MyChart, letter or phone within 4 business days after the clinic has received the results. If you do not hear from us within 7 days, please contact the clinic through MyChart or phone. If you have a critical or abnormal lab result, we will notify you by phone as soon as possible.  Submit refill requests through [employer] or call your pharmacy and they will forward the refill request to us. Please allow 3 business days for your refill to be completed.          Additional Information About Your Visit        MyChart Information     [employer] gives you secure access to your electronic health record. If you see a primary care provider, you can also send messages to your care team and make appointments. If you have questions, please call your primary care clinic.  If you do not have a primary care provider, please call [phone] and they will assist you.        Care EveryWhere ID     This is your Care EveryWhere ID. This could be used by other organizations to access your [facility] medical records  [id number]         Blood Pressure from Last 3 Encounters:   05/10/17 112/74   04/05/17 112/66   02/28/17 110/70    Weight from Last 3 Encounters:   08/30/17 147 lb 3.2 oz (66.8 kg)   05/10/17 145 lb 6.4  oz (66 kg)   04/05/17 145 lb 3.2 oz (65.9 kg)              Today, you had the following     No orders found for display       Primary Care Provider    None Specified       No primary provider on file.        Equal Access to Services     CARAAFIA MARIFER : Hadii aad ku hadtres Arevalo, waboda luqadaha, qaybta kaalmada michelle, malia contreras summerkevyn page laBrendenmike paul. So [facility] [phone].    ATENCIÓN: Si habla español, tiene a wagner disposición servicios gratuitos de asistencia lingüística. Llame al [phone].    We comply with applicable federal civil rights laws and [place] laws. We do not discriminate on the basis of race, color, national origin, age, disability, sex, sexual orientation, or gender identity.            Thank you!     Thank you for choosing [facility] - PRIMARY CARE [facility]  for your care. Our goal is always to provide you with excellent care. Hearing back from our patients is one way we can continue to improve our services. Please take a few minutes to complete the written survey that you may receive in the mail after your visit with us. Thank you!             Your Updated Medication List - Protect others around you: Learn how to safely use, store and throw away your medicines at www.disposemymeds.org.          This list is accurate as of: 10/4/17  7:27 AM.  Always use your most recent med list.                   Brand Name Dispense Instructions for use Diagnosis    ciprofloxacin 500 MG tablet    CIPRO    14 tablet    Take 1 tablet (500 mg) by mouth 2 times daily    Urinary tract infection with hematuria, site unspecified       metroNIDAZOLE 500 MG tablet    FLAGYL    14 tablet    Take 1 tablet (500 mg) by mouth 2 times daily    BV (bacterial vaginosis)       paragard intrauterine copper      1 each by Intrauterine route once Reported on 5/10/2017

## 2017-10-04 NOTE — PROGRESS NOTES
[facility] [place] - PRIMARY CARE SKIN    CC : Hair loss   SUBJECTIVE:                                                    Adrian Gomez is a 32 year old female referred by Dr. Justus Carson who is following up for a patch of hair loss on the left scalp that was first noticed 1-2 months ago. Hair growth noticed centrally, but hair loss has extended on the peripheral edges. She has been recommended to eat more kandice in her diet by her first physician.    Last treatment : 8/29/2017  Treatment : 1.5 mg of Kenalog injected  Number : 1  Side effects noted : None    Personal Medical History  Skin Cancer : NO  Eczema Psoriasis Rosacea Autoimmune   NO NO NO NO     Family Medical History  Connective tissue disease : NO  Thyroid disease : NO  Hair Loss : She also reports hair loss in her niece at similar time, and the patient is worried about possible infection.  Skin Cancer : NO  Eczema Psoriasis Rosacea Autoimmune   NO NO NO NO     Occupation : lab work (indoor).    Patient Active Problem List   Diagnosis     CARDIOVASCULAR SCREENING; LDL GOAL LESS THAN 160     Personal history of tuberculosis     Acute cystitis without hematuria       Past Medical History:   Diagnosis Date     Personal history of tuberculosis 2004    Finished on medications for 1 year in 6/2004    Past Surgical History:   Procedure Laterality Date     None        Social History   Substance Use Topics     Smoking status: Never Smoker     Smokeless tobacco: Never Used     Alcohol use Yes      Comment: 2 drinks a month    Family History     Problem (# of Occurrences) Relation (Name,Age of Onset)    Breast Cancer (1) Mother    Family History Negative (2) Mother, Father    HEART DISEASE (4) Maternal Grandmother, Maternal Grandfather, Paternal Grandmother, Paternal Grandfather       Negative family history of: DIABETES, Hypertension           Prescription Medications as of 10/4/2017             ciprofloxacin (CIPRO) 500 MG tablet Take 1 tablet (500 mg) by mouth 2  times daily    metroNIDAZOLE (FLAGYL) 500 MG tablet Take 1 tablet (500 mg) by mouth 2 times daily    paragard intrauterine copper 1 each by Intrauterine route once Reported on 5/10/2017          No Known Allergies     INTEGUMENTARY/SKIN: POSITIVE for hair loss  ROS : 14 point review of systems was negative except the symptoms listed above in the HPI.    This document serves as a record of the services and decisions personally performed and made by Carol Ladd MD. It was created on her behalf by Giovanny Sol, a trained medical scribe.  The creation of this document is based on the scribe's personal observations and the provider's statements to the medical scribe.  Giovanny Sol, October 4, 2017 7:29 AM      OBJECTIVE:                                                    GENERAL: healthy, alert and no distress  ENDOCRINE : Thyroid : No thyromegaly.  SKIN: Paulson Skin Type - V.  Scalp and Face were examined. The dermatoscope was used to help evaluate pigmented lesions.  Skin Pertinent Findings:  Pattern of loss : 4 cm x 5 cm circular patch of hair loss left frontal scalp. Some new hair growth but patch has also enlarged  Hair condition : normal.  Scalp condition : Normal, no scaling, no erythema.  Incr. hair w/ gentle pull : NO.  Excess hair : NO.  Eyebrow hair is normal in appearance.  Name : Triamcinolone acetonide (Kenalog) injection.  Indication : Alopecia Areata - injection to promote hair growth in affected areas.  Location(s) : left frontal scalp.  Completed by : Carol Ladd MD  Note : Prior to the procedure, we discussed possible hypo- and hyperpigmentation or depression of the skin post-procedure. Explained that more then one injection may be required, that these injections are distributed 4-6 weeks apart, and that up to a total of six injections may be needed.    Skin was cleansed with alcohol. 0.3 mL triamcinolone acetonide 10 mg/mL drawn up and injected in 0.1 mL aliquots in the area of alopecia.  Total  injected : 0.3 mL= 3 mg.  Lot # : [id number]. Expiration Date : Jan 2019    Blanching was present during the injection. Minimal bleeding occurred. Patient left in satisfactory condition.  Treatment number : 2.    Diagnostic Test Results:  No results found for this or any previous visit (from the past 24 hour(s)).          ASSESSMENT:                                                      Encounter Diagnosis   Name Primary?     AA (alopecia areata) Yes       PLAN:                                                    Patient Instructions   FUTURE APPOINTMENTS  Follow up in 4-6 week(s)          PROCEDURES:                                                    None.    TT : 20 minutes.  CT : 15 minutes.      The information in this document, created by the medical scribe for me, accurately reflects the services I personally performed and the decisions made by me. I have reviewed and approved this document for accuracy prior to leaving the patient care area.  Carol Ladd MD October 4, 2017 7:29 AM  [facility] - PRIMARY CARE SKIN

## 2017-11-01 ENCOUNTER — OFFICE VISIT (OUTPATIENT)
Dept: FAMILY MEDICINE | Facility: CLINIC | Age: 32
End: 2017-11-01
Payer: COMMERCIAL

## 2017-11-01 DIAGNOSIS — L63.9 AA (ALOPECIA AREATA): Primary | ICD-10-CM

## 2017-11-01 PROCEDURE — 11900 INJECT SKIN LESIONS </W 7: CPT | Performed by: FAMILY MEDICINE

## 2017-11-01 NOTE — LETTER
11/1/2017         RE: Adrian Gomez  [address]  [place] [address]        Dear Colleague,    Thank you for referring your patient, Adrian Gomez, to the [facility] - PRIMARY CARE SKIN. Please see a copy of my visit note below.    [facility] PRIMARY CARE SKIN    CC : Hair loss   SUBJECTIVE:                                                    Adrian Gomez is a 32 year old female who is following up for a patch of hair loss on the left scalp that was first noticed 2-3 months ago. Hair growth has continued to increase centrally, but hair growth has been noted on the peripheral edges.    Last treatment : 10/4/2017  Treatment : 3 mg of Kenalog injected  Number : 2  Side effects noted : None    Personal Medical History  Skin Cancer : NO  Eczema Psoriasis Rosacea Autoimmune   NO NO NO NO     Family Medical History  Connective tissue disease : NO  Thyroid disease : NO  Hair Loss : She also reports hair loss in her niece at similar time, and the patient is worried about possible infection.  Skin Cancer : NO  Eczema Psoriasis Rosacea Autoimmune   NO NO NO NO     Occupation : lab work (indoor).    Patient Active Problem List   Diagnosis     CARDIOVASCULAR SCREENING; LDL GOAL LESS THAN 160     Personal history of tuberculosis     Acute cystitis without hematuria       Past Medical History:   Diagnosis Date     Personal history of tuberculosis 2004    Finished on medications for 1 year in 6/2004    Past Surgical History:   Procedure Laterality Date     None        Social History   Substance Use Topics     Smoking status: Never Smoker     Smokeless tobacco: Never Used     Alcohol use Yes      Comment: 2 drinks a month    Family History     Problem (# of Occurrences) Relation (Name,Age of Onset)    Breast Cancer (1) Mother    Family History Negative (2) Mother, Father    HEART DISEASE (4) Maternal Grandmother, Maternal Grandfather, Paternal Grandmother, Paternal Grandfather       Negative family history  of: DIABETES, Hypertension           Prescription Medications as of 11/1/2017             ciprofloxacin (CIPRO) 500 MG tablet Take 1 tablet (500 mg) by mouth 2 times daily    metroNIDAZOLE (FLAGYL) 500 MG tablet Take 1 tablet (500 mg) by mouth 2 times daily    paragard intrauterine copper 1 each by Intrauterine route once Reported on 5/10/2017          No Known Allergies     INTEGUMENTARY/SKIN: POSITIVE for hair loss  ROS : 14 point review of systems was negative except the symptoms listed above in the HPI.    This document serves as a record of the services and decisions personally performed and made by Carol Ladd MD. It was created on her behalf by Giovanny Sol, a trained medical scribe.  The creation of this document is based on the scribe's personal observations and the provider's statements to the medical scribe.  Giovanny Sol, November 1, 2017 7:30 AM      OBJECTIVE:                                                    GENERAL: healthy, alert and no distress  ENDOCRINE : Thyroid : No thyromegaly.  SKIN: Paulson Skin Type - V.  Scalp and Face were examined. The dermatoscope was used to help evaluate pigmented lesions.  Skin Pertinent Findings:  Pattern of loss : 4 cm x 5 cm circular patch of hair loss left frontal scalp. Some new hair growth centrally.  Hair condition : normal.  Scalp condition : Normal, no scaling, no erythema.  Incr. hair w/ gentle pull : NO.  Excess hair : NO.  Eyebrow hair is normal in appearance.  Name : Triamcinolone acetonide (Kenalog) injection.  Indication : Alopecia Areata - injection to promote hair growth in affected areas.  Location(s) : left frontal scalp.  Completed by : Carol Ladd MD  Note : Prior to the procedure, we discussed possible hypo- and hyperpigmentation or depression of the skin post-procedure. Explained that more then one injection may be required, that these injections are distributed 4-6 weeks apart, and that up to a total of six injections may be  needed.    Skin was cleansed with alcohol. 0.3 mL triamcinolone acetonide 10 mg/mL drawn up and injected in 0.1 mL aliquots in the area of alopecia.  Total injected : 0.3 mL= 3 mg.  Lot # : [id number]. Expiration Date : Jan 2019    Blanching was present during the injection. Minimal bleeding occurred. Patient left in satisfactory condition.  Treatment number : 3.    Diagnostic Test Results:  No results found for this or any previous visit (from the past 24 hour(s)).      ASSESSMENT:                                                      Encounter Diagnosis   Name Primary?     AA (alopecia areata) Yes         PLAN:                                                    Patient Instructions   FUTURE APPOINTMENTS  Follow up in 4-6 week(s).        PROCEDURES:                                                    None.    TT : 20 minutes.  CT : 15 minutes.      The information in this document, created by the medical scribe for me, accurately reflects the services I personally performed and the decisions made by me. I have reviewed and approved this document for accuracy prior to leaving the patient care area.  Carol Ladd MD November 1, 2017 7:27 AM  [facility] - PRIMARY CARE SKIN    Again, thank you for allowing me to participate in the care of your patient.        Sincerely,        Jessenia Ladd MD

## 2017-11-01 NOTE — MR AVS SNAPSHOT
After Visit Summary   11/1/2017    Adrian Gomez    MRN: [id number]           Patient Information     Date Of Birth          1985        Visit Information        Provider Department      11/1/2017 7:20 AM Jessenia Ladd MD [facility] Primary Care Skin        Today's Diagnoses     AA (alopecia areata)    -  1      Care Instructions    FUTURE APPOINTMENTS  Follow up in 4-6 week(s).          Follow-ups after your visit        Who to contact     If you have questions or need follow up information about today's clinic visit or your schedule please contact [facility] - PRIMARY CARE SKIN directly at [phone].  Normal or non-critical lab and imaging results will be communicated to you by MyChart, letter or phone within 4 business days after the clinic has received the results. If you do not hear from us within 7 days, please contact the clinic through MyChart or phone. If you have a critical or abnormal lab result, we will notify you by phone as soon as possible.  Submit refill requests through [employer] or call your pharmacy and they will forward the refill request to us. Please allow 3 business days for your refill to be completed.          Additional Information About Your Visit        MyChart Information     [employer] gives you secure access to your electronic health record. If you see a primary care provider, you can also send messages to your care team and make appointments. If you have questions, please call your primary care clinic.  If you do not have a primary care provider, please call [phone] and they will assist you.        Care EveryWhere ID     This is your Care EveryWhere ID. This could be used by other organizations to access your [place] medical records  [id number]         Blood Pressure from Last 3 Encounters:   05/10/17 112/74   04/05/17 112/66   02/28/17 110/70    Weight from Last 3 Encounters:   08/30/17 147 lb 3.2 oz (66.8 kg)   05/10/17 145 lb 6.4 oz  (66 kg)   04/05/17 145 lb 3.2 oz (65.9 kg)              Today, you had the following     No orders found for display       Primary Care Provider Fax #    Provider Not In System [phone]                Equal Access to Services     DAISY MARIFER : Hadii jaylene corado neda Sofer, waaxda luqadaha, qaybta kaalmada adekevynyada, malia page laBrendenmike paul. So [facility] [phone].    ATENCIÓN: Si habla español, tiene a wagner disposición servicios gratuitos de asistencia lingüística. Llame al [phone].    We comply with applicable federal civil rights laws and [place] laws. We do not discriminate on the basis of race, color, national origin, age, disability, sex, sexual orientation, or gender identity.            Thank you!     Thank you for choosing [facility] - PRIMARY CARE [facility]  for your care. Our goal is always to provide you with excellent care. Hearing back from our patients is one way we can continue to improve our services. Please take a few minutes to complete the written survey that you may receive in the mail after your visit with us. Thank you!             Your Updated Medication List - Protect others around you: Learn how to safely use, store and throw away your medicines at www.disposemymeds.org.          This list is accurate as of: 11/1/17  7:29 AM.  Always use your most recent med list.                   Brand Name Dispense Instructions for use Diagnosis    ciprofloxacin 500 MG tablet    CIPRO    14 tablet    Take 1 tablet (500 mg) by mouth 2 times daily    Urinary tract infection with hematuria, site unspecified       metroNIDAZOLE 500 MG tablet    FLAGYL    14 tablet    Take 1 tablet (500 mg) by mouth 2 times daily    BV (bacterial vaginosis)       paragard intrauterine copper      1 each by Intrauterine route once Reported on 5/10/2017

## 2017-11-01 NOTE — PROGRESS NOTES
[facility] - PRIMARY CARE SKIN    CC : Hair loss   SUBJECTIVE:                                                    Adrian Gomez is a 32 year old female who is following up for a patch of hair loss on the left scalp that was first noticed 2-3 months ago. Hair growth has continued to increase centrally, but hair growth has been noted on the peripheral edges.    Last treatment : 10/4/2017  Treatment : 3 mg of Kenalog injected  Number : 2  Side effects noted : None    Personal Medical History  Skin Cancer : NO  Eczema Psoriasis Rosacea Autoimmune   NO NO NO NO     Family Medical History  Connective tissue disease : NO  Thyroid disease : NO  Hair Loss : She also reports hair loss in her niece at similar time, and the patient is worried about possible infection.  Skin Cancer : NO  Eczema Psoriasis Rosacea Autoimmune   NO NO NO NO     Occupation : lab work (indoor).    Patient Active Problem List   Diagnosis     CARDIOVASCULAR SCREENING; LDL GOAL LESS THAN 160     Personal history of tuberculosis     Acute cystitis without hematuria       Past Medical History:   Diagnosis Date     Personal history of tuberculosis 2004    Finished on medications for 1 year in 6/2004    Past Surgical History:   Procedure Laterality Date     None        Social History   Substance Use Topics     Smoking status: Never Smoker     Smokeless tobacco: Never Used     Alcohol use Yes      Comment: 2 drinks a month    Family History     Problem (# of Occurrences) Relation (Name,Age of Onset)    Breast Cancer (1) Mother    Family History Negative (2) Mother, Father    HEART DISEASE (4) Maternal Grandmother, Maternal Grandfather, Paternal Grandmother, Paternal Grandfather       Negative family history of: DIABETES, Hypertension           Prescription Medications as of 11/1/2017             ciprofloxacin (CIPRO) 500 MG tablet Take 1 tablet (500 mg) by mouth 2 times daily    metroNIDAZOLE (FLAGYL) 500 MG tablet Take 1 tablet (500 mg) by mouth 2 times  daily    paragard intrauterine copper 1 each by Intrauterine route once Reported on 5/10/2017          No Known Allergies     INTEGUMENTARY/SKIN: POSITIVE for hair loss  ROS : 14 point review of systems was negative except the symptoms listed above in the HPI.    This document serves as a record of the services and decisions personally performed and made by Carol Ladd MD. It was created on her behalf by Giovanny Sol, a trained medical scribe.  The creation of this document is based on the scribe's personal observations and the provider's statements to the medical scribe.  Giovanny Sol, November 1, 2017 7:30 AM      OBJECTIVE:                                                    GENERAL: healthy, alert and no distress  ENDOCRINE : Thyroid : No thyromegaly.  SKIN: Paulson Skin Type - V.  Scalp and Face were examined. The dermatoscope was used to help evaluate pigmented lesions.  Skin Pertinent Findings:  Pattern of loss : 4 cm x 5 cm circular patch of hair loss left frontal scalp. Some new hair growth centrally.  Hair condition : normal.  Scalp condition : Normal, no scaling, no erythema.  Incr. hair w/ gentle pull : NO.  Excess hair : NO.  Eyebrow hair is normal in appearance.  Name : Triamcinolone acetonide (Kenalog) injection.  Indication : Alopecia Areata - injection to promote hair growth in affected areas.  Location(s) : left frontal scalp.  Completed by : Carol Ladd MD  Note : Prior to the procedure, we discussed possible hypo- and hyperpigmentation or depression of the skin post-procedure. Explained that more then one injection may be required, that these injections are distributed 4-6 weeks apart, and that up to a total of six injections may be needed.    Skin was cleansed with alcohol. 0.3 mL triamcinolone acetonide 10 mg/mL drawn up and injected in 0.1 mL aliquots in the area of alopecia.  Total injected : 0.3 mL= 3 mg.  Lot # : [id number]. Expiration Date : Jan 2019    Blanching was present during the  injection. Minimal bleeding occurred. Patient left in satisfactory condition.  Treatment number : 3.    Diagnostic Test Results:  No results found for this or any previous visit (from the past 24 hour(s)).      ASSESSMENT:                                                      Encounter Diagnosis   Name Primary?     AA (alopecia areata) Yes         PLAN:                                                    Patient Instructions   FUTURE APPOINTMENTS  Follow up in 4-6 week(s).        PROCEDURES:                                                    None.    TT : 20 minutes.  CT : 15 minutes.      The information in this document, created by the medical scribe for me, accurately reflects the services I personally performed and the decisions made by me. I have reviewed and approved this document for accuracy prior to leaving the patient care area.  Carol Ladd MD November 1, 2017 7:27 AM  [facility] - PRIMARY CARE SKIN

## 2017-12-20 ENCOUNTER — OFFICE VISIT (OUTPATIENT)
Dept: FAMILY MEDICINE | Facility: CLINIC | Age: 32
End: 2017-12-20
Payer: COMMERCIAL

## 2017-12-20 DIAGNOSIS — L63.9 AA (ALOPECIA AREATA): Primary | ICD-10-CM

## 2017-12-20 PROCEDURE — 11900 INJECT SKIN LESIONS </W 7: CPT | Performed by: FAMILY MEDICINE

## 2017-12-20 NOTE — LETTER
12/20/2017         RE: Adrian Gomez  [address]  [place] [address]        Dear Colleague,    Thank you for referring your patient, Adrian Gomez, to the [facility] PRIMARY CARE SKIN. Please see a copy of my visit note below.    [facility] PRIMARY CARE SKIN    CC : Hair loss   SUBJECTIVE:                                                    Adrian Gomez is a 32 year old female who is following up for a patch of hair loss on the left scalp that was first noticed approximately 4 months ago. A new patch of hair loss has developed on the right scalp.    Last treatment : 11/1/2017  Treatment : 3 mg of Kenalog injected  Number : 3  Side effects noted : None    Personal Medical History  Skin Cancer : NO  Eczema Psoriasis Rosacea Autoimmune   NO NO NO NO     Family Medical History  Connective tissue disease : NO  Thyroid disease : NO  Hair Loss : She also reports hair loss in her niece at similar time, and the patient is worried about possible infection.  Skin Cancer : NO  Eczema Psoriasis Rosacea Autoimmune   NO NO NO NO     Occupation : lab work (indoor).    Patient Active Problem List   Diagnosis     CARDIOVASCULAR SCREENING; LDL GOAL LESS THAN 160     Personal history of tuberculosis     Acute cystitis without hematuria       Past Medical History:   Diagnosis Date     Personal history of tuberculosis 2004    Finished on medications for 1 year in 6/2004    Past Surgical History:   Procedure Laterality Date     None        Social History   Substance Use Topics     Smoking status: Never Smoker     Smokeless tobacco: Never Used     Alcohol use Yes      Comment: 2 drinks a month    Family History     Problem (# of Occurrences) Relation (Name,Age of Onset)    Breast Cancer (1) Mother    Family History Negative (2) Mother, Father    HEART DISEASE (4) Maternal Grandmother, Maternal Grandfather, Paternal Grandmother, Paternal Grandfather       Negative family history of: DIABETES, Hypertension                No Known Allergies     INTEGUMENTARY/SKIN: POSITIVE for hair loss  ROS : 14 point review of systems was negative except the symptoms listed above in the HPI.    This document serves as a record of the services and decisions personally performed and made by Carol Ladd MD. It was created on her behalf by Giovanny Sol, a trained medical scribe.  The creation of this document is based on the scribe's personal observations and the provider's statements to the medical scribe.  Giovanny Sol, December 20, 2017 7:10 AM      OBJECTIVE:                                                    GENERAL: healthy, alert and no distress  ENDOCRINE : Thyroid : No thyromegaly.  SKIN: Paulson Skin Type - V.  Scalp and Face were examined. The dermatoscope was used to help evaluate pigmented lesions.  Skin Pertinent Findings:  Left frontal scalp : 6.5 cm x 5 cm patch of hair loss, but some new hair growth centrally.  Right occipital scalp : 4 cm x 3 cm patch of hair loss.  Reminder of scalp looks normal.  Scalp condition : Normal, no scaling, no erythema.  Eyebrow hair is normal in appearance.  Name : Triamcinolone acetonide (Kenalog) injection.  Indication : Alopecia Areata - injection to promote hair growth in affected areas.  Completed by : Carol Ladd MD  Note : Prior to the procedure, we discussed possible hypo- and hyperpigmentation or depression of the skin post-procedure. Explained that more then one injection may be required, that these injections are distributed 4-6 weeks apart, and that up to a total of six injections may be needed.    Skin was cleansed with alcohol. 1.0 mL triamcinolone acetonide 10 mg/mL drawn up and injected in 0.1 mL aliquots in the area of alopecia.  Total injected :   Left frontal scalp : 0.6 mL= 6 mg.  Right occipital scalp : 0.4 mL= 4 mg.  Lot # : [id number]. Expiration Date : Mar 2019    Blanching was present during the injection. Minimal bleeding occurred. Patient left in satisfactory  condition.  Treatment number : 4.    Diagnostic Test Results:  No results found for this or any previous visit (from the past 24 hour(s)).      ASSESSMENT:                                                      Encounter Diagnosis   Name Primary?     AA (alopecia areata) Yes         PLAN:                                                    Patient Instructions   FUTURE APPOINTMENTS  Follow up in 4-6 weeks.        PROCEDURES:                                                    None.    TT : 20 minutes.  CT : 15 minutes.      The information in this document, created by the medical scribe for me, accurately reflects the services I personally performed and the decisions made by me. I have reviewed and approved this document for accuracy prior to leaving the patient care area.  Carol Ladd MD December 20, 2017 7:10 AM  [facility] - PRIMARY CARE SKIN    Again, thank you for allowing me to participate in the care of your patient.        Sincerely,        Jessenia Ladd MD

## 2017-12-20 NOTE — PROGRESS NOTES
[facility] - PRIMARY CARE SKIN    CC : Hair loss   SUBJECTIVE:                                                    Adrian Gomez is a 32 year old female who is following up for a patch of hair loss on the left scalp that was first noticed approximately 4 months ago. A new patch of hair loss has developed on the right scalp.    Last treatment : 11/1/2017  Treatment : 3 mg of Kenalog injected  Number : 3  Side effects noted : None    Personal Medical History  Skin Cancer : NO  Eczema Psoriasis Rosacea Autoimmune   NO NO NO NO     Family Medical History  Connective tissue disease : NO  Thyroid disease : NO  Hair Loss : She also reports hair loss in her niece at similar time, and the patient is worried about possible infection.  Skin Cancer : NO  Eczema Psoriasis Rosacea Autoimmune   NO NO NO NO     Occupation : lab work (indoor).    Patient Active Problem List   Diagnosis     CARDIOVASCULAR SCREENING; LDL GOAL LESS THAN 160     Personal history of tuberculosis     Acute cystitis without hematuria       Past Medical History:   Diagnosis Date     Personal history of tuberculosis 2004    Finished on medications for 1 year in 6/2004    Past Surgical History:   Procedure Laterality Date     None        Social History   Substance Use Topics     Smoking status: Never Smoker     Smokeless tobacco: Never Used     Alcohol use Yes      Comment: 2 drinks a month    Family History     Problem (# of Occurrences) Relation (Name,Age of Onset)    Breast Cancer (1) Mother    Family History Negative (2) Mother, Father    HEART DISEASE (4) Maternal Grandmother, Maternal Grandfather, Paternal Grandmother, Paternal Grandfather       Negative family history of: DIABETES, Hypertension               No Known Allergies     INTEGUMENTARY/SKIN: POSITIVE for hair loss  ROS : 14 point review of systems was negative except the symptoms listed above in the HPI.    This document serves as a record of the services and decisions personally performed  and made by Carol Ladd MD. It was created on her behalf by Giovanny Sol, a trained medical scribe.  The creation of this document is based on the scribe's personal observations and the provider's statements to the medical scribe.  Giovanny Sol, December 20, 2017 7:10 AM      OBJECTIVE:                                                    GENERAL: healthy, alert and no distress  ENDOCRINE : Thyroid : No thyromegaly.  SKIN: Paulson Skin Type - V.  Scalp and Face were examined. The dermatoscope was used to help evaluate pigmented lesions.  Skin Pertinent Findings:  Left frontal scalp : 6.5 cm x 5 cm patch of hair loss, but some new hair growth centrally.  Right occipital scalp : 4 cm x 3 cm patch of hair loss.  Reminder of scalp looks normal.  Scalp condition : Normal, no scaling, no erythema.  Eyebrow hair is normal in appearance.  Name : Triamcinolone acetonide (Kenalog) injection.  Indication : Alopecia Areata - injection to promote hair growth in affected areas.  Completed by : Carol Ladd MD  Note : Prior to the procedure, we discussed possible hypo- and hyperpigmentation or depression of the skin post-procedure. Explained that more then one injection may be required, that these injections are distributed 4-6 weeks apart, and that up to a total of six injections may be needed.    Skin was cleansed with alcohol. 1.0 mL triamcinolone acetonide 10 mg/mL drawn up and injected in 0.1 mL aliquots in the area of alopecia.  Total injected :   Left frontal scalp : 0.6 mL= 6 mg.  Right occipital scalp : 0.4 mL= 4 mg.  Lot # : [id number]. Expiration Date : Mar 2019    Blanching was present during the injection. Minimal bleeding occurred. Patient left in satisfactory condition.  Treatment number : 4.    Diagnostic Test Results:  No results found for this or any previous visit (from the past 24 hour(s)).      ASSESSMENT:                                                      Encounter Diagnosis   Name Primary?     AA (alopecia  areata) Yes         PLAN:                                                    Patient Instructions   FUTURE APPOINTMENTS  Follow up in 4-6 weeks.        PROCEDURES:                                                    None.    TT : 20 minutes.  CT : 15 minutes.      The information in this document, created by the medical scribe for me, accurately reflects the services I personally performed and the decisions made by me. I have reviewed and approved this document for accuracy prior to leaving the patient care area.  Carol Ladd MD December 20, 2017 7:10 AM  [facility] - PRIMARY CARE SKIN

## 2017-12-20 NOTE — MR AVS SNAPSHOT
After Visit Summary   12/20/2017    Adrian Gomez    MRN: [id number]           Patient Information     Date Of Birth          1985        Visit Information        Provider Department      12/20/2017 7:20 AM Jessenia Ladd MD [facility] Primary Care Skin        Today's Diagnoses     AA (alopecia areata)    -  1      Care Instructions    FUTURE APPOINTMENTS  Follow up in 4-6 weeks.          Follow-ups after your visit        Your next 10 appointments already scheduled     Dec 20, 2017  7:20 AM CST   Office Visit with Jessenia Ladd MD   [facility] Primary Care Skin ([facility] )    [address] [place] [address]  [address]  [facility] [id number] [phone]           Bring a current list of meds and any records pertaining to this visit. For Physicals, please bring immunization records and any forms needing to be filled out. Please arrive 10 minutes early to complete paperwork.              Who to contact     If you have questions or need follow up information about today's clinic visit or your schedule please contact [facility] PRIMARY CARE SKIN directly at [phone].  Normal or non-critical lab and imaging results will be communicated to you by MyChart, letter or phone within 4 business days after the clinic has received the results. If you do not hear from us within 7 days, please contact the clinic through [employer] or phone. If you have a critical or abnormal lab result, we will notify you by phone as soon as possible.  Submit refill requests through [employer] or call your pharmacy and they will forward the refill request to us. Please allow 3 business days for your refill to be completed.          Additional Information About Your Visit        MyChart Information     [employer] gives you secure access to your electronic health record. If you see a primary care provider, you can also send messages to your care team and make  appointments. If you have questions, please call your primary care clinic.  If you do not have a primary care provider, please call [phone] and they will assist you.        Care EveryWhere ID     This is your Care EveryWhere ID. This could be used by other organizations to access your [place] medical records  [id number]         Blood Pressure from Last 3 Encounters:   05/10/17 112/74   04/05/17 112/66   02/28/17 110/70    Weight from Last 3 Encounters:   08/30/17 147 lb 3.2 oz (66.8 kg)   05/10/17 145 lb 6.4 oz (66 kg)   04/05/17 145 lb 3.2 oz (65.9 kg)              Today, you had the following     No orders found for display       Primary Care Provider Fax #    Provider Not In System [phone]                Equal Access to Services     DAISY CAICEDO : Sherry Arevalo, wamahi hernandez, scarlett kaalcl martinez, malia donahue . So [facility] [phone].    ATENCIÓN: Si habla español, tiene a wagner disposición servicios gratuitos de asistencia lingüística. La al [phone].    We comply with applicable federal civil rights laws and [place] laws. We do not discriminate on the basis of race, color, national origin, age, disability, sex, sexual orientation, or gender identity.            Thank you!     Thank you for choosing [facility] - PRIMARY CARE [facility]  for your care. Our goal is always to provide you with excellent care. Hearing back from our patients is one way we can continue to improve our services. Please take a few minutes to complete the written survey that you may receive in the mail after your visit with us. Thank you!             Your Updated Medication List - Protect others around you: Learn how to safely use, store and throw away your medicines at www.disposemymeds.org.      Notice  As of 12/20/2017  7:17 AM    You have not been prescribed any medications.

## 2018-01-29 ENCOUNTER — OFFICE VISIT (OUTPATIENT)
Dept: FAMILY MEDICINE | Facility: CLINIC | Age: 33
End: 2018-01-29
Payer: COMMERCIAL

## 2018-01-29 DIAGNOSIS — L63.9 AA (ALOPECIA AREATA): Primary | ICD-10-CM

## 2018-01-29 PROCEDURE — 11900 INJECT SKIN LESIONS </W 7: CPT | Performed by: FAMILY MEDICINE

## 2018-01-29 NOTE — MR AVS SNAPSHOT
After Visit Summary   1/29/2018    Adrian Gomez    MRN: [id number]           Patient Information     Date Of Birth          1985        Visit Information        Provider Department      1/29/2018 11:00 AM Jessenia Ladd MD [facility] [place]        Today's Diagnoses     AA (alopecia areata)    -  1      Care Instructions    FUTURE APPOINTMENTS  Follow up in 4-6 weeks.          Follow-ups after your visit        Who to contact     If you have questions or need follow up information about today's clinic visit or your schedule please contact [facility] [place] directly at [phone].  Normal or non-critical lab and imaging results will be communicated to you by MyChart, letter or phone within 4 business days after the clinic has received the results. If you do not hear from us within 7 days, please contact the clinic through MyChart or phone. If you have a critical or abnormal lab result, we will notify you by phone as soon as possible.  Submit refill requests through [employer] or call your pharmacy and they will forward the refill request to us. Please allow 3 business days for your refill to be completed.          Additional Information About Your Visit        MyChart Information     [employer] gives you secure access to your electronic health record. If you see a primary care provider, you can also send messages to your care team and make appointments. If you have questions, please call your primary care clinic.  If you do not have a primary care provider, please call [phone] and they will assist you.        Care EveryWhere ID     This is your Care EveryWhere ID. This could be used by other organizations to access your [place] medical records  [id number]         Blood Pressure from Last 3 Encounters:   05/10/17 112/74   04/05/17 112/66   02/28/17 110/70    Weight from Last 3 Encounters:   08/30/17 147 lb 3.2 oz (66.8 kg)   05/10/17 145 lb 6.4 oz (66 kg)    04/05/17 145 lb 3.2 oz (65.9 kg)              Today, you had the following     No orders found for display       Primary Care Provider Fax #    Provider Not In System [phone]                Equal Access to Services     DAISY CALLESALAINA : Hadii jaylene ku miguelo Soascencionali, waboda luqadaha, qaevita kaalmada adeshannon, malia page laBrendenmike . So [facility] [phone].    ATENCIÓN: Si habla español, tiene a wagner disposición servicios gratuitos de asistencia lingüística. Llame al [phone].    We comply with applicable federal civil rights laws and [place] laws. We do not discriminate on the basis of race, color, national origin, age, disability, sex, sexual orientation, or gender identity.            Thank you!     Thank you for choosing [facility]  for your care. Our goal is always to provide you with excellent care. Hearing back from our patients is one way we can continue to improve our services. Please take a few minutes to complete the written survey that you may receive in the mail after your visit with us. Thank you!             Your Updated Medication List - Protect others around you: Learn how to safely use, store and throw away your medicines at www.disposemymeds.org.      Notice  As of 1/29/2018 11:44 AM    You have not been prescribed any medications.

## 2018-01-29 NOTE — LETTER
1/29/2018         RE: Adrian Gomez  [address]  [facility] [address]        Dear Colleague,    Thank you for referring your patient, Adrian Gomez, to the [facility] [place]. Please see a copy of my visit note below.    [facility] - PRIMARY CARE SKIN    CC : Hair loss   SUBJECTIVE:                                                    Adrian Gomez is a 32 year old female who is following up for alopecia areata on the scalp that was first noticed approximately 5 months ago.    Last treatment : 12/20/2017  Previous treatments :    12/20/17 : 6 mg of triamcinolone into left frontal scalp, 4 mg of triamcinolone into right occipital scalp.  11/1/17 : 3 mg of triamcinolone into left frontal scalp.  10/4/17 : 3 mg of triamcinolone into left frontal scalp.  8/29/17 : 1.5 mg of triamcinolone into left frontal scalp.    Response to treatment : Hair growth has been noted from both patches of hair loss, but the size may be enlarging.  Side effects noted : None    Personal Medical History  Skin Cancer : NO  Eczema Psoriasis Rosacea Autoimmune   NO NO NO NO     Family Medical History  Connective tissue disease : NO  Thyroid disease : NO  Hair Loss : She also reports hair loss in her niece at similar time, and the patient is worried about possible infection.  Skin Cancer : NO  Eczema Psoriasis Rosacea Autoimmune   NO NO NO NO     Occupation : lab work (indoor).    Patient Active Problem List   Diagnosis     CARDIOVASCULAR SCREENING; LDL GOAL LESS THAN 160     Personal history of tuberculosis     Acute cystitis without hematuria       Past Medical History:   Diagnosis Date     Personal history of tuberculosis 2004    Finished on medications for 1 year in 6/2004    Past Surgical History:   Procedure Laterality Date     None        Social History   Substance Use Topics     Smoking status: Never Smoker     Smokeless tobacco: Never Used     Alcohol use Yes      Comment: 2 drinks a month    Family History      Problem (# of Occurrences) Relation (Name,Age of Onset)    Breast Cancer (1) Mother    Family History Negative (2) Mother, Father    HEART DISEASE (4) Maternal Grandmother, Maternal Grandfather, Paternal Grandmother, Paternal Grandfather       Negative family history of: DIABETES, Hypertension               No Known Allergies     INTEGUMENTARY/SKIN: POSITIVE for hair loss  ROS : 14 point review of systems was negative except the symptoms listed above in the HPI.    This document serves as a record of the services and decisions personally performed and made by Carol Ladd MD. It was created on her behalf by Giovanny Sol, a trained medical scribe.  The creation of this document is based on the scribe's personal observations and the provider's statements to the medical scribe.  Giovanny Sol, January 29, 2018 11:29 AM      OBJECTIVE:                                                    GENERAL: healthy, alert and no distress  SKIN: Paulson Skin Type - V.  Scalp and Face were examined. The dermatoscope was used to help evaluate pigmented lesions.  Skin Pertinent Findings:  Left frontal scalp : 7 x 7 cm patch of hair loss, new hair growth centrally.  Right occipital scalp : 4 x 3 cm patch of hair loss, scattered new hair growth.  Scalp condition : Normal, no scaling, no erythema.  Reminder of scalp looks normal. Eyebrow hair is normal in appearance.  Name : Triamcinolone acetonide (Kenalog) injection.  Indication : Alopecia Areata - injection to promote hair growth in affected areas.  Completed by : Carol Ladd MD  Note : Prior to the procedure, we discussed possible hypo- and hyperpigmentation or depression of the skin post-procedure. Explained that more then one injection may be required, that these injections are distributed 4-6 weeks apart, and that up to a total of six injections may be needed.    Skin was cleansed with alcohol. 2.0 mL of triamcinolone acetonide 10 mg/mL drawn up and injected in 0.1 mL aliquots in  the area of alopecia.  Total injected :   Left frontal scalp : 1.0 mL= 10 mg.  Right occipital scalp : 0.5 mL= 5 mg.  Lot # : [id number]. Expiration Date : Mar 2019      Diagnostic Test Results:  No results found for this or any previous visit (from the past 24 hour(s)).          ASSESSMENT:                                                      Encounter Diagnosis   Name Primary?     AA (alopecia areata) Yes         PLAN:                                                    Patient Instructions   FUTURE APPOINTMENTS  Follow up in 4-6 weeks.        PROCEDURES:                                                    None.    TT : 20 minutes.  CT : 15 minutes.      The information in this document, created by the medical scribe for me, accurately reflects the services I personally performed and the decisions made by me. I have reviewed and approved this document for accuracy prior to leaving the patient care area.  Carol Ladd MD January 29, 2018 11:16 AM  [facility] - PRIMARY CARE SKIN    Again, thank you for allowing me to participate in the care of your patient.        Sincerely,        Jessenia Ladd MD

## 2018-01-29 NOTE — PROGRESS NOTES
[facility] - PRIMARY CARE SKIN    CC : Hair loss   SUBJECTIVE:                                                    Adrian Gomez is a 32 year old female who is following up for alopecia areata on the scalp that was first noticed approximately 5 months ago.    Last treatment : 12/20/2017  Previous treatments :    12/20/17 : 6 mg of triamcinolone into left frontal scalp, 4 mg of triamcinolone into right occipital scalp.  11/1/17 : 3 mg of triamcinolone into left frontal scalp.  10/4/17 : 3 mg of triamcinolone into left frontal scalp.  8/29/17 : 1.5 mg of triamcinolone into left frontal scalp.    Response to treatment : Hair growth has been noted from both patches of hair loss, but the size may be enlarging.  Side effects noted : None    Personal Medical History  Skin Cancer : NO  Eczema Psoriasis Rosacea Autoimmune   NO NO NO NO     Family Medical History  Connective tissue disease : NO  Thyroid disease : NO  Hair Loss : She also reports hair loss in her niece at similar time, and the patient is worried about possible infection.  Skin Cancer : NO  Eczema Psoriasis Rosacea Autoimmune   NO NO NO NO     Occupation : lab work (indoor).    Patient Active Problem List   Diagnosis     CARDIOVASCULAR SCREENING; LDL GOAL LESS THAN 160     Personal history of tuberculosis     Acute cystitis without hematuria       Past Medical History:   Diagnosis Date     Personal history of tuberculosis 2004    Finished on medications for 1 year in 6/2004    Past Surgical History:   Procedure Laterality Date     None        Social History   Substance Use Topics     Smoking status: Never Smoker     Smokeless tobacco: Never Used     Alcohol use Yes      Comment: 2 drinks a month    Family History     Problem (# of Occurrences) Relation (Name,Age of Onset)    Breast Cancer (1) Mother    Family History Negative (2) Mother, Father    HEART DISEASE (4) Maternal Grandmother, Maternal Grandfather, Paternal Grandmother, Paternal Grandfather        Negative family history of: DIABETES, Hypertension               No Known Allergies     INTEGUMENTARY/SKIN: POSITIVE for hair loss  ROS : 14 point review of systems was negative except the symptoms listed above in the HPI.    This document serves as a record of the services and decisions personally performed and made by Carol Ladd MD. It was created on her behalf by Giovanny Sol, a trained medical scribe.  The creation of this document is based on the scribe's personal observations and the provider's statements to the medical scribe.  Giovanny Sol, January 29, 2018 11:29 AM      OBJECTIVE:                                                    GENERAL: healthy, alert and no distress  SKIN: Paulson Skin Type - V.  Scalp and Face were examined. The dermatoscope was used to help evaluate pigmented lesions.  Skin Pertinent Findings:  Left frontal scalp : 7 x 7 cm patch of hair loss, new hair growth centrally.  Right occipital scalp : 4 x 3 cm patch of hair loss, scattered new hair growth.  Scalp condition : Normal, no scaling, no erythema.  Reminder of scalp looks normal. Eyebrow hair is normal in appearance.  Name : Triamcinolone acetonide (Kenalog) injection.  Indication : Alopecia Areata - injection to promote hair growth in affected areas.  Completed by : Carol Ladd MD  Note : Prior to the procedure, we discussed possible hypo- and hyperpigmentation or depression of the skin post-procedure. Explained that more then one injection may be required, that these injections are distributed 4-6 weeks apart, and that up to a total of six injections may be needed.    Skin was cleansed with alcohol. 2.0 mL of triamcinolone acetonide 10 mg/mL drawn up and injected in 0.1 mL aliquots in the area of alopecia.  Total injected :   Left frontal scalp : 1.0 mL= 10 mg.  Right occipital scalp : 0.5 mL= 5 mg.  Lot # : [id number]. Expiration Date : Mar 2019      Diagnostic Test Results:  No results found for this or any previous visit  (from the past 24 hour(s)).          ASSESSMENT:                                                      Encounter Diagnosis   Name Primary?     AA (alopecia areata) Yes         PLAN:                                                    Patient Instructions   FUTURE APPOINTMENTS  Follow up in 4-6 weeks.        PROCEDURES:                                                    None.    TT : 20 minutes.  CT : 15 minutes.      The information in this document, created by the medical scribe for me, accurately reflects the services I personally performed and the decisions made by me. I have reviewed and approved this document for accuracy prior to leaving the patient care area.  Carol Ladd MD January 29, 2018 11:16 AM  [facility] - PRIMARY CARE SKIN

## 2018-03-21 ENCOUNTER — OFFICE VISIT (OUTPATIENT)
Dept: FAMILY MEDICINE | Facility: CLINIC | Age: 33
End: 2018-03-21
Payer: COMMERCIAL

## 2018-03-21 VITALS — DIASTOLIC BLOOD PRESSURE: 72 MMHG | SYSTOLIC BLOOD PRESSURE: 107 MMHG

## 2018-03-21 DIAGNOSIS — L63.9 AA (ALOPECIA AREATA): Primary | ICD-10-CM

## 2018-03-21 PROCEDURE — 11900 INJECT SKIN LESIONS </W 7: CPT | Performed by: FAMILY MEDICINE

## 2018-03-21 PROCEDURE — 99213 OFFICE O/P EST LOW 20 MIN: CPT | Mod: 25 | Performed by: FAMILY MEDICINE

## 2018-03-21 NOTE — MR AVS SNAPSHOT
After Visit Summary   3/21/2018    Adrian Gomez    MRN: [id number]           Patient Information     Date Of Birth          1985        Visit Information        Provider Department      3/21/2018 11:20 AM Jessenia Ladd MD [facility]        Today's Diagnoses     AA (alopecia areata)    -  1      Care Instructions    FUTURE APPOINTMENTS  Follow up in 4-6 weeks.          Follow-ups after your visit        Your next 10 appointments already scheduled     Apr 18, 2018 11:40 AM CDT   Office Visit with Jessenia Ladd MD   [facility] ([facility])    [address] [place] [id number] [phone]           Bring a current list of meds and any records pertaining to this visit. For Physicals, please bring immunization records and any forms needing to be filled out. Please arrive 10 minutes early to complete paperwork.              Who to contact     If you have questions or need follow up information about today's clinic visit or your schedule please contact [facility] directly at [phone].  Normal or non-critical lab and imaging results will be communicated to you by MyChart, letter or phone within 4 business days after the clinic has received the results. If you do not hear from us within 7 days, please contact the clinic through [employer] or phone. If you have a critical or abnormal lab result, we will notify you by phone as soon as possible.  Submit refill requests through [employer] or call your pharmacy and they will forward the refill request to us. Please allow 3 business days for your refill to be completed.          Additional Information About Your Visit        MyChart Information     [employer] gives you secure access to your electronic health record. If you see a primary care provider, you can also send messages to your care team and make appointments. If you have questions, please  call your primary care clinic.  If you do not have a primary care provider, please call [phone] and they will assist you.        Care EveryWhere ID     This is your Care EveryWhere ID. This could be used by other organizations to access your [place] medical records  [id number]        Your Vitals Were     Breastfeeding?                   No            Blood Pressure from Last 3 Encounters:   03/21/18 107/72   05/10/17 112/74   04/05/17 112/66    Weight from Last 3 Encounters:   08/30/17 147 lb 3.2 oz (66.8 kg)   05/10/17 145 lb 6.4 oz (66 kg)   04/05/17 145 lb 3.2 oz (65.9 kg)              Today, you had the following     No orders found for display       Primary Care Provider Fax #    Provider Not In System [phone]                Equal Access to Services     [facility] : Hadii jaylene Arevalo, waaxda luqadaha, qaybta kaalmada michelle, malia donahue . So [facility] [phone].    ATENCIÓN: Si habla español, tiene a wagner disposición servicios gratuitos de asistencia lingüística. Llame al [phone].    We comply with applicable federal civil rights laws and [place] laws. We do not discriminate on the basis of race, color, national origin, age, disability, sex, sexual orientation, or gender identity.            Thank you!     Thank you for choosing [facility] [place]  for your care. Our goal is always to provide you with excellent care. Hearing back from our patients is one way we can continue to improve our services. Please take a few minutes to complete the written survey that you may receive in the mail after your visit with us. Thank you!             Your Updated Medication List - Protect others around you: Learn how to safely use, store and throw away your medicines at www.disposemymeds.org.      Notice  As of 3/21/2018 12:08 PM    You have not been prescribed any medications.

## 2018-03-21 NOTE — LETTER
3/21/2018         RE: Adrian Gomez  [address]  [facility] [address]        Dear Colleague,    Thank you for referring your patient, Adrian Gomez, to the [facility] [place]. Please see a copy of my visit note below.    [facility] - PRIMARY CARE SKIN    CC : Hair loss   SUBJECTIVE:                                                    Adrian Gomez is a 33 year old female who is following up for alopecia areata on the scalp that was first noticed approximately 1 year ago. Patches of hair loss may be enlarging, and she has been noticing increasing hair shedding when washing hair. She denies any itchiness.    Last treatment : 1/29/2018  Previous treatments :    1/29/2018 : 15 mg of triamcinolone into left frontal scalp, 5 mg of triamcinolone into right occipital scalp  12/20/17 : 6 mg of triamcinolone into left frontal scalp, 4 mg of triamcinolone into right occipital scalp.  11/1/17 : 3 mg of triamcinolone into left frontal scalp.  10/4/17 : 3 mg of triamcinolone into left frontal scalp.  8/29/17 : 1.5 mg of triamcinolone into left frontal scalp.    Personal Medical History  Skin Cancer : NO  Eczema Psoriasis Rosacea Autoimmune   NO NO NO NO     Family Medical History  Connective tissue disease : NO  Thyroid disease : NO  Hair Loss : She also reports hair loss in her niece at similar time, and the patient is worried about possible infection.  Skin Cancer : NO  Eczema Psoriasis Rosacea Autoimmune   NO NO NO NO     Occupation : lab work (indoor).    Patient Active Problem List   Diagnosis     CARDIOVASCULAR SCREENING; LDL GOAL LESS THAN 160     Personal history of tuberculosis     Acute cystitis without hematuria       Past Medical History:   Diagnosis Date     Personal history of tuberculosis 2004    Finished on medications for 1 year in 6/2004    Past Surgical History:   Procedure Laterality Date     None        Social History   Substance Use Topics     Smoking status: Never Smoker      Smokeless tobacco: Never Used     Alcohol use Yes      Comment: 2 drinks a month    Family History     Problem (# of Occurrences) Relation (Name,Age of Onset)    Breast Cancer (1) Mother    Family History Negative (2) Mother, Father    HEART DISEASE (4) Maternal Grandmother, Maternal Grandfather, Paternal Grandmother, Paternal Grandfather       Negative family history of: DIABETES, Hypertension               No Known Allergies     INTEGUMENTARY/SKIN: POSITIVE for hair loss  ROS : 14 point review of systems was negative except the symptoms listed above in the HPI.    This document serves as a record of the services and decisions personally performed and made by Carol Ladd MD. It was created on her behalf by Giovanny Sol, a trained medical scribe.  The creation of this document is based on the scribe's personal observations and the provider's statements to the medical scribe.  Giovanny Sol, March 21, 2018 11:51 AM      OBJECTIVE:                                                    GENERAL: healthy, alert and no distress  SKIN: Paulson Skin Type - V.  Scalp and Face were examined. The dermatoscope was used to help evaluate pigmented lesions.  Skin Pertinent Findings:  Left frontal scalp : 8 cm wide patch of hair loss with 2 cm wide linear extension of hair loss at 5 o'clock and 7 o'clock positions. Hair growth centrally. No erythema and no induration.  Mid-occipital scalp : 5 cm x 5 cm patch of hair loss with new hair growth centrally  Right and left lateral frontal scalp : Thinning of hair  Scalp condition : Normal, no scaling, no erythema. Reminder of scalp looks normal. Eyebrow hair is normal in appearance.  Name : Triamcinolone acetonide (Kenalog) injection.  Indication : Alopecia Areata - injection to promote hair growth in affected areas.  Completed by : Carol Ladd MD  Note : Prior to the procedure, we discussed possible hypo- and hyperpigmentation or depression of the skin post-procedure. Explained that  more then one injection may be required, that these injections are distributed 4-6 weeks apart, and that up to a total of six injections may be needed.    Skin was cleansed with alcohol. Two syringes of 1.0 mL of triamcinolone acetonide 10 mg/mL drawn up and 1 syringe 1.0 mL of triamcinolone diluted into 5 mg/mL and injected in 0.1 mL aliquots in the areas of alopecia.  Total injected :   Left frontal scalp : 10 mg total.  Mid-occipital scalp : 7 mg total.  Right lateral frontal scalp : 1 mg total.  Lot # : [id number]. Expiration Date : Apr 2019  Treatment number : 6 on left frontal scalp, 3 on mid-occipital scalp          ASSESSMENT:                                                      Encounter Diagnosis   Name Primary?     AA (alopecia areata) Yes         PLAN:                                                    Patient Instructions   FUTURE APPOINTMENTS  Follow up in 4-6 weeks.        PROCEDURES:                                                    None.    TT : 20 minutes.  CT : 15 minutes.      The information in this document, created by the medical scribe for me, accurately reflects the services I personally performed and the decisions made by me. I have reviewed and approved this document for accuracy prior to leaving the patient care area.  Carol Ladd MD March 21, 2018 11:36 AM  [facility] - PRIMARY CARE SKIN    Again, thank you for allowing me to participate in the care of your patient.        Sincerely,        Jessenia Ladd MD

## 2018-03-21 NOTE — PROGRESS NOTES
[facility] - PRIMARY CARE SKIN    CC : Hair loss   SUBJECTIVE:                                                    Adrian Gomez is a 33 year old female who is following up for alopecia areata on the scalp that was first noticed approximately 1 year ago. Patches of hair loss may be enlarging, and she has been noticing increasing hair shedding when washing hair. She denies any itchiness.    Last treatment : 1/29/2018  Previous treatments :    1/29/2018 : 15 mg of triamcinolone into left frontal scalp, 5 mg of triamcinolone into right occipital scalp  12/20/17 : 6 mg of triamcinolone into left frontal scalp, 4 mg of triamcinolone into right occipital scalp.  11/1/17 : 3 mg of triamcinolone into left frontal scalp.  10/4/17 : 3 mg of triamcinolone into left frontal scalp.  8/29/17 : 1.5 mg of triamcinolone into left frontal scalp.    Personal Medical History  Skin Cancer : NO  Eczema Psoriasis Rosacea Autoimmune   NO NO NO NO     Family Medical History  Connective tissue disease : NO  Thyroid disease : NO  Hair Loss : She also reports hair loss in her niece at similar time, and the patient is worried about possible infection.  Skin Cancer : NO  Eczema Psoriasis Rosacea Autoimmune   NO NO NO NO     Occupation : lab work (indoor).    Patient Active Problem List   Diagnosis     CARDIOVASCULAR SCREENING; LDL GOAL LESS THAN 160     Personal history of tuberculosis     Acute cystitis without hematuria       Past Medical History:   Diagnosis Date     Personal history of tuberculosis 2004    Finished on medications for 1 year in 6/2004    Past Surgical History:   Procedure Laterality Date     None        Social History   Substance Use Topics     Smoking status: Never Smoker     Smokeless tobacco: Never Used     Alcohol use Yes      Comment: 2 drinks a month    Family History     Problem (# of Occurrences) Relation (Name,Age of Onset)    Breast Cancer (1) Mother    Family History Negative (2) Mother, Father    HEART DISEASE  (4) Maternal Grandmother, Maternal Grandfather, Paternal Grandmother, Paternal Grandfather       Negative family history of: DIABETES, Hypertension               No Known Allergies     INTEGUMENTARY/SKIN: POSITIVE for hair loss  ROS : 14 point review of systems was negative except the symptoms listed above in the HPI.    This document serves as a record of the services and decisions personally performed and made by Carol Ladd MD. It was created on her behalf by Giovanny Sol, a trained medical scribe.  The creation of this document is based on the scribe's personal observations and the provider's statements to the medical scribe.  Giovanny Sol, March 21, 2018 11:51 AM      OBJECTIVE:                                                    GENERAL: healthy, alert and no distress  SKIN: Paulson Skin Type - V.  Scalp and Face were examined. The dermatoscope was used to help evaluate pigmented lesions.  Skin Pertinent Findings:  Left frontal scalp : 8 cm wide patch of hair loss with 2 cm wide linear extension of hair loss at 5 o'clock and 7 o'clock positions. Hair growth centrally. No erythema and no induration.  Mid-occipital scalp : 5 cm x 5 cm patch of hair loss with new hair growth centrally  Right and left lateral frontal scalp : Thinning of hair  Scalp condition : Normal, no scaling, no erythema. Reminder of scalp looks normal. Eyebrow hair is normal in appearance.  Name : Triamcinolone acetonide (Kenalog) injection.  Indication : Alopecia Areata - injection to promote hair growth in affected areas.  Completed by : Carol Ladd MD  Note : Prior to the procedure, we discussed possible hypo- and hyperpigmentation or depression of the skin post-procedure. Explained that more then one injection may be required, that these injections are distributed 4-6 weeks apart, and that up to a total of six injections may be needed.    Skin was cleansed with alcohol. Two syringes of 1.0 mL of triamcinolone acetonide 10 mg/mL drawn  up and 1 syringe 1.0 mL of triamcinolone diluted into 5 mg/mL and injected in 0.1 mL aliquots in the areas of alopecia.  Total injected :   Left frontal scalp : 10 mg total.  Mid-occipital scalp : 7 mg total.  Right lateral frontal scalp : 1 mg total.  Lot # : [id number]. Expiration Date : Apr 2019  Treatment number : 6 on left frontal scalp, 3 on mid-occipital scalp          ASSESSMENT:                                                      Encounter Diagnosis   Name Primary?     AA (alopecia areata) Yes         PLAN:                                                    Patient Instructions   FUTURE APPOINTMENTS  Follow up in 4-6 weeks.        PROCEDURES:                                                    None.    TT : 20 minutes.  CT : 15 minutes.      The information in this document, created by the medical scribe for me, accurately reflects the services I personally performed and the decisions made by me. I have reviewed and approved this document for accuracy prior to leaving the patient care area.  Carol Ladd MD March 21, 2018 11:36 AM  [facility] - PRIMARY CARE SKIN

## 2018-04-18 ENCOUNTER — OFFICE VISIT (OUTPATIENT)
Dept: FAMILY MEDICINE | Facility: CLINIC | Age: 33
End: 2018-04-18
Payer: COMMERCIAL

## 2018-04-18 VITALS — DIASTOLIC BLOOD PRESSURE: 70 MMHG | SYSTOLIC BLOOD PRESSURE: 107 MMHG

## 2018-04-18 DIAGNOSIS — L63.9 AA (ALOPECIA AREATA): ICD-10-CM

## 2018-04-18 DIAGNOSIS — L65.9 HAIR LOSS: Primary | ICD-10-CM

## 2018-04-18 PROCEDURE — 11900 INJECT SKIN LESIONS </W 7: CPT | Mod: 51 | Performed by: FAMILY MEDICINE

## 2018-04-18 PROCEDURE — 11101 HC INJECTION INTO SKIN LESIONS <=7: CPT | Performed by: FAMILY MEDICINE

## 2018-04-18 PROCEDURE — 11100 HC BIOPSY SKIN/SUBQ/MUC MEM, EACH ADDTL LESION: CPT | Performed by: FAMILY MEDICINE

## 2018-04-18 PROCEDURE — 99213 OFFICE O/P EST LOW 20 MIN: CPT | Mod: 25 | Performed by: FAMILY MEDICINE

## 2018-04-18 PROCEDURE — 88305 TISSUE EXAM BY PATHOLOGIST: CPT | Mod: TC | Performed by: FAMILY MEDICINE

## 2018-04-18 NOTE — PATIENT INSTRUCTIONS
FUTURE APPOINTMENTS  Follow up in 2 weeks for Suture Removal, Tissue Pathology Review and re-evaluation    SCALP POST-TREATMENT CARE INSTRUCTIONS  Do not go swimming, until the wound is healed.    However, showering is encouraged. The next time you shower, remove the dressing and clean the area with soap and water. Neither soap, water nor shampoo will hurt the surgical area. Dry the area well with a towel or hairdryer and then apply a small amount of Vaseline over the wound. Then, cover again with a new dressing.    Signs of Infection:  Infection can occur in any area where skin has been disrupted.  If you notice persistent redness, swelling, colored drainage, increasing pain, fever or other signs of infection, please call us at: [phone] and ask to have me or my colleague paged. We will call you back to discuss.    Pathology Results:  You will be notified, generally via letter or MyChart, in approximately 10 days. If there is anything we need to discuss or further treatment needed, I will call you to discuss it.

## 2018-04-18 NOTE — MR AVS SNAPSHOT
After Visit Summary   4/18/2018    Adrian Gomez    MRN: [id number]           Patient Information     Date Of Birth          1985        Visit Information        Provider Department      4/18/2018 8:00 AM Jessenia Ladd MD [facility] [place]        Today's Diagnoses     Hair loss    -  1    AA (alopecia areata)          Care Instructions    FUTURE APPOINTMENTS  Follow up in 2 weeks for Suture Removal, Tissue Pathology Review and re-evaluation    SCALP POST-TREATMENT CARE INSTRUCTIONS  Do not go swimming, until the wound is healed.    However, showering is encouraged. The next time you shower, remove the dressing and clean the area with soap and water. Neither soap, water nor shampoo will hurt the surgical area. Dry the area well with a towel or hairdryer and then apply a small amount of Vaseline over the wound. Then, cover again with a new dressing.    Signs of Infection:  Infection can occur in any area where skin has been disrupted.  If you notice persistent redness, swelling, colored drainage, increasing pain, fever or other signs of infection, please call us at: [phone] and ask to have me or my colleague paged. We will call you back to discuss.    Pathology Results:  You will be notified, generally via letter or MyChart, in approximately 10 days. If there is anything we need to discuss or further treatment needed, I will call you to discuss it.          Follow-ups after your visit        Your next 10 appointments already scheduled     May 31, 2018  8:00 AM CDT   (Arrive by 7:45 AM)   New Patient Visit with Kirk Souza MD   [facility] ([facility])    [address] [place] [id number] [phone]              Who to contact     If you have questions or need follow up information about today's clinic visit or your schedule please contact [facility] [place] directly at  [phone].  Normal or non-critical lab and imaging results will be communicated to you by MyChart, letter or phone within 4 business days after the clinic has received the results. If you do not hear from us within 7 days, please contact the clinic through [facility] or phone. If you have a critical or abnormal lab result, we will notify you by phone as soon as possible.  Submit refill requests through [facility] or call your pharmacy and they will forward the refill request to us. Please allow 3 business days for your refill to be completed.          Additional Information About Your Visit        [facility] Information     [facility] gives you secure access to your electronic health record. If you see a primary care provider, you can also send messages to your care team and make appointments. If you have questions, please call your primary care clinic.  If you do not have a primary care provider, please call [phone] and they will assist you.        Care EveryWhere ID     This is your Care EveryWhere ID. This could be used by other organizations to access your [place] medical records  [id number]        Your Vitals Were     Breastfeeding?                   No            Blood Pressure from Last 3 Encounters:   04/18/18 107/70   03/21/18 107/72   05/10/17 112/74    Weight from Last 3 Encounters:   08/30/17 147 lb 3.2 oz (66.8 kg)   05/10/17 145 lb 6.4 oz (66 kg)   04/05/17 145 lb 3.2 oz (65.9 kg)              Today, you had the following     No orders found for display       Primary Care Provider Fax #    Provider Not In System [phone]                Equal Access to Services     [facility] : Hadii aad ku hadasho Soascencionali, waaxda luqadaha, qaybta kaalmada adeegyada, malia donahue . So [facility] [phone].    ATENCIÓN: Si habla español, tiene a wagner disposición servicios gratuitos de asistencia lingüística. Llame al [phone].    We comply with applicable federal civil rights laws and  [place] laws. We do not discriminate on the basis of race, color, national origin, age, disability, sex, sexual orientation, or gender identity.            Thank you!     Thank you for choosing [facility] [place]  for your care. Our goal is always to provide you with excellent care. Hearing back from our patients is one way we can continue to improve our services. Please take a few minutes to complete the written survey that you may receive in the mail after your visit with us. Thank you!             Your Updated Medication List - Protect others around you: Learn how to safely use, store and throw away your medicines at www.disposemymeds.org.      Notice  As of 4/18/2018  8:25 AM    You have not been prescribed any medications.

## 2018-04-18 NOTE — LETTER
4/18/2018         RE: Adrian Gomez  [address]  [place] [address]        Dear Colleague,    Thank you for referring your patient, Adrian Gomez, to the [facility] [place]. Please see a copy of my visit note below.    [facility] - PRIMARY CARE SKIN    CC : Hair loss   SUBJECTIVE:                                                    Adrian Gomez is a 33 year old female who is following up for alopecia areata on the scalp that was first noticed approximately 1 year ago. A new patch of hair loss has developed. Other previous patches may be enlarging in size. No tenderness or erythema.    Last treatment : 3/21/2018  Previous treatments :    3/21/2018 : 10 mg triamcinolone into left frontal scalp, 7 mg of triamcinolone into mid-occipital scalp, 1 mg triamcinolone into right lateral frontal scalp  1/29/2018 : 15 mg of triamcinolone into left frontal scalp, 5 mg of triamcinolone into right occipital scalp  12/20/17 : 6 mg of triamcinolone into left frontal scalp, 4 mg of triamcinolone into right occipital scalp.  11/1/17 : 3 mg of triamcinolone into left frontal scalp.  10/4/17 : 3 mg of triamcinolone into left frontal scalp.  8/29/17 : 1.5 mg of triamcinolone into left frontal scalp.    Previous labs from 8/29/2017 office visit note:  She reports that recent HGB, CBC were normal. Adrian accessed her recent lab work on her mobile phone:  Iron 49  Ferritin 20.2  Total iron binding capacity 387  TARYN, CMP, TSH done on 8/29/17    Personal Medical History  Skin Cancer : NO  Eczema Psoriasis Rosacea Autoimmune   NO NO NO NO     Family Medical History  Connective tissue disease : NO  Thyroid disease : NO  Hair Loss : She also reports hair loss in her niece at similar time, and the patient is worried about possible infection.  Skin Cancer : NO  Eczema Psoriasis Rosacea Autoimmune   NO NO NO NO     Occupation : lab work (indoor).    Patient Active Problem List   Diagnosis     CARDIOVASCULAR SCREENING;  LDL GOAL LESS THAN 160     Personal history of tuberculosis     Acute cystitis without hematuria       Past Medical History:   Diagnosis Date     Personal history of tuberculosis 2004    Finished on medications for 1 year in 6/2004    Past Surgical History:   Procedure Laterality Date     None        Social History   Substance Use Topics     Smoking status: Never Smoker     Smokeless tobacco: Never Used     Alcohol use Yes      Comment: 2 drinks a month    Family History     Problem (# of Occurrences) Relation (Name,Age of Onset)    Breast Cancer (1) Mother    Family History Negative (2) Mother, Father    HEART DISEASE (4) Maternal Grandmother, Maternal Grandfather, Paternal Grandmother, Paternal Grandfather       Negative family history of: DIABETES, Hypertension               No Known Allergies     INTEGUMENTARY/SKIN: POSITIVE for hair loss  ROS : 14 point review of systems was negative except the symptoms listed above in the HPI.    This document serves as a record of the services and decisions personally performed and made by Carol Ladd MD. It was created on her behalf by Giovanny Sol, a trained medical scribe.  The creation of this document is based on the scribe's personal observations and the provider's statements to the medical scribe.  Giovanny Sol, April 18, 2018 7:54 AM      OBJECTIVE:                                                    GENERAL: healthy, alert and no distress  SKIN: Paulson Skin Type - V.  Scalp and Face were examined. The dermatoscope was used to help evaluate pigmented lesions.  Skin Pertinent Findings:  Left side of scalp : Widest diameter is 35 mm. Another 3 cm of patchy hair loss but new growth seen at both sites and a linear extension down to the ear.    Right side of scalp : Widest diameter is 5 cm with new hair growth and then a linear extension down to the ear.    Left side of scalp:      Crown of scalp:      Right occipital scalp:           Name : Triamcinolone acetonide  (Kenalog) injection.  Indication : Alopecia Areata - injection to promote hair growth in affected areas.  Completed by : Carol Ladd MD  Note : Prior to the procedure, we discussed possible hypo- and hyperpigmentation or depression of the skin post-procedure. Explained that more then one injection may be required, that these injections are distributed 4-6 weeks apart, and that up to a total of six injections may be needed.    Skin was cleansed with alcohol. Two syringes of 1.0 mL of triamcinolone acetonide 10 mg/mL drawn up and injected in 0.1 mL aliquots in the areas of alopecia.  Total injected :   18 mg distributed among three sites: Left frontal scalp, occipital scalp; Right scalp  Lot # : [id number]. Expiration Date : Apr 2019  Treatment number : 7    MDM : discussed doing an biopsy to confirm nonscarring alopecia areata as compared to a scarring etiology. I would favor the former.      ASSESSMENT:                                                      Encounter Diagnoses   Name Primary?     Hair loss Yes     AA (alopecia areata)          PLAN:                                                    Patient Instructions   FUTURE APPOINTMENTS  Follow up in 2 weeks for Suture Removal, Tissue Pathology Review and re-evaluation    SCALP POST-TREATMENT CARE INSTRUCTIONS  Do not go swimming, until the wound is healed.    However, showering is encouraged. The next time you shower, remove the dressing and clean the area with soap and water. Neither soap, water nor shampoo will hurt the surgical area. Dry the area well with a towel or hairdryer and then apply a small amount of Vaseline over the wound. Then, cover again with a new dressing.    Signs of Infection:  Infection can occur in any area where skin has been disrupted.  If you notice persistent redness, swelling, colored drainage, increasing pain, fever or other signs of infection, please call us at: [phone] and ask to have me or my colleague paged. We will call  you back to discuss.    Pathology Results:  You will be notified, generally via letter or MyChart, in approximately 10 days. If there is anything we need to discuss or further treatment needed, I will call you to discuss it.        PROCEDURES:                                                    Name : Punch biopsy  Indication : Submission to pathology for evaluation of tissue.  Location(s) : right parietal scalp.  Completed by : Carol Ladd MD  Photo Taken : yes.  Anesthesia : Patient was anesthetized by infiltrating the area surrounding the lesion with 1% lidocaine.   epinephrine [id number] : Yes.  Buffered with bicarbonate : Yes.  Note : Discussed the risk of pain, infection, scarring, hypo- or hyperpigmentation and recurrence or need for re-treatment. The benefits of treatment and alternative treatments were also discussed.    During this procedure, the universal protocol was utilized. The patient's identity was confirmed by no less than two patient identifiers, correct procedure was verified, correct site was verified and marked as applicable and a final pause was completed.    Sterile technique was used throughout the procedure. The skin was cleaned and prepped with surgical cleanser. Once adequate anesthesia was obtained, two 4mm punch tools were used with appropriate skin traction. The specimen was sent to pathology.    Direct pressure was applied for hemostasis. The skin was closed with 3-0 Prolene interrupted simple stitch. No bleeding was present upon the completion of the procedure. The wound was coated with antibacterial ointment. A dry sterile dressing was applied.  Total number of stitches in closure of epidermis : 3 in one site, 2 in other site    Primary provider and referring provider will be informed regarding the wound culture and tissue sample report when it returns.    Suture removal : 7-10 days.      The information in this document, created by the medical scribe for me, accurately reflects the  services I personally performed and the decisions made by me. I have reviewed and approved this document for accuracy prior to leaving the patient care area.  Carol Ladd MD April 18, 2018 7:54 AM  [facility] - PRIMARY CARE SKIN    Again, thank you for allowing me to participate in the care of your patient.        Sincerely,        Jessenia Ladd MD

## 2018-04-18 NOTE — PROGRESS NOTES
[facility] - PRIMARY CARE SKIN    CC : Hair loss   SUBJECTIVE:                                                    Adrian Gomez is a 33 year old female who is following up for alopecia areata on the scalp that was first noticed approximately 1 year ago. A new patch of hair loss has developed. Other previous patches may be enlarging in size. No tenderness or erythema.    Last treatment : 3/21/2018  Previous treatments :    3/21/2018 : 10 mg triamcinolone into left frontal scalp, 7 mg of triamcinolone into mid-occipital scalp, 1 mg triamcinolone into right lateral frontal scalp  1/29/2018 : 15 mg of triamcinolone into left frontal scalp, 5 mg of triamcinolone into right occipital scalp  12/20/17 : 6 mg of triamcinolone into left frontal scalp, 4 mg of triamcinolone into right occipital scalp.  11/1/17 : 3 mg of triamcinolone into left frontal scalp.  10/4/17 : 3 mg of triamcinolone into left frontal scalp.  8/29/17 : 1.5 mg of triamcinolone into left frontal scalp.    Previous labs from 8/29/2017 office visit note:  She reports that recent HGB, CBC were normal. Adrian accessed her recent lab work on her mobile phone:  Iron 49  Ferritin 20.2  Total iron binding capacity 387  TARYN, CMP, TSH done on 8/29/17    Personal Medical History  Skin Cancer : NO  Eczema Psoriasis Rosacea Autoimmune   NO NO NO NO     Family Medical History  Connective tissue disease : NO  Thyroid disease : NO  Hair Loss : She also reports hair loss in her niece at similar time, and the patient is worried about possible infection.  Skin Cancer : NO  Eczema Psoriasis Rosacea Autoimmune   NO NO NO NO     Occupation : lab work (indoor).    Patient Active Problem List   Diagnosis     CARDIOVASCULAR SCREENING; LDL GOAL LESS THAN 160     Personal history of tuberculosis     Acute cystitis without hematuria       Past Medical History:   Diagnosis Date     Personal history of tuberculosis 2004    Finished on medications for 1 year in 6/2004    Past Surgical  History:   Procedure Laterality Date     None        Social History   Substance Use Topics     Smoking status: Never Smoker     Smokeless tobacco: Never Used     Alcohol use Yes      Comment: 2 drinks a month    Family History     Problem (# of Occurrences) Relation (Name,Age of Onset)    Breast Cancer (1) Mother    Family History Negative (2) Mother, Father    HEART DISEASE (4) Maternal Grandmother, Maternal Grandfather, Paternal Grandmother, Paternal Grandfather       Negative family history of: DIABETES, Hypertension               No Known Allergies     INTEGUMENTARY/SKIN: POSITIVE for hair loss  ROS : 14 point review of systems was negative except the symptoms listed above in the HPI.    This document serves as a record of the services and decisions personally performed and made by Carol Ladd MD. It was created on her behalf by Giovanny Sol, a trained medical scribe.  The creation of this document is based on the scribe's personal observations and the provider's statements to the medical scribe.  Giovanny Sol, April 18, 2018 7:54 AM      OBJECTIVE:                                                    GENERAL: healthy, alert and no distress  SKIN: Paulson Skin Type - V.  Scalp and Face were examined. The dermatoscope was used to help evaluate pigmented lesions.  Skin Pertinent Findings:  Left side of scalp : Widest diameter is 35 mm. Another 3 cm of patchy hair loss but new growth seen at both sites and a linear extension down to the ear.    Right side of scalp : Widest diameter is 5 cm with new hair growth and then a linear extension down to the ear.    Left side of scalp:      Crown of scalp:      Right occipital scalp:           Name : Triamcinolone acetonide (Kenalog) injection.  Indication : Alopecia Areata - injection to promote hair growth in affected areas.  Completed by : Carol Ladd MD  Note : Prior to the procedure, we discussed possible hypo- and hyperpigmentation or depression of the skin  post-procedure. Explained that more then one injection may be required, that these injections are distributed 4-6 weeks apart, and that up to a total of six injections may be needed.    Skin was cleansed with alcohol. Two syringes of 1.0 mL of triamcinolone acetonide 10 mg/mL drawn up and injected in 0.1 mL aliquots in the areas of alopecia.  Total injected :   18 mg distributed among three sites: Left frontal scalp, occipital scalp; Right scalp  Lot # : [id number]. Expiration Date : Apr 2019  Treatment number : 7    MDM : discussed doing an biopsy to confirm nonscarring alopecia areata as compared to a scarring etiology. I would favor the former.      ASSESSMENT:                                                      Encounter Diagnoses   Name Primary?     Hair loss Yes     AA (alopecia areata)          PLAN:                                                    Patient Instructions   FUTURE APPOINTMENTS  Follow up in 2 weeks for Suture Removal, Tissue Pathology Review and re-evaluation    SCALP POST-TREATMENT CARE INSTRUCTIONS  Do not go swimming, until the wound is healed.    However, showering is encouraged. The next time you shower, remove the dressing and clean the area with soap and water. Neither soap, water nor shampoo will hurt the surgical area. Dry the area well with a towel or hairdryer and then apply a small amount of Vaseline over the wound. Then, cover again with a new dressing.    Signs of Infection:  Infection can occur in any area where skin has been disrupted.  If you notice persistent redness, swelling, colored drainage, increasing pain, fever or other signs of infection, please call us at: [phone] and ask to have me or my colleague paged. We will call you back to discuss.    Pathology Results:  You will be notified, generally via letter or MyChart, in approximately 10 days. If there is anything we need to discuss or further treatment needed, I will call you to discuss it.        PROCEDURES:                                                     Name : Punch biopsy  Indication : Submission to pathology for evaluation of tissue.  Location(s) : right parietal scalp.  Completed by : Carol Ladd MD  Photo Taken : yes.  Anesthesia : Patient was anesthetized by infiltrating the area surrounding the lesion with 1% lidocaine.   epinephrine [id number] : Yes.  Buffered with bicarbonate : Yes.  Note : Discussed the risk of pain, infection, scarring, hypo- or hyperpigmentation and recurrence or need for re-treatment. The benefits of treatment and alternative treatments were also discussed.    During this procedure, the universal protocol was utilized. The patient's identity was confirmed by no less than two patient identifiers, correct procedure was verified, correct site was verified and marked as applicable and a final pause was completed.    Sterile technique was used throughout the procedure. The skin was cleaned and prepped with surgical cleanser. Once adequate anesthesia was obtained, two 4mm punch tools were used with appropriate skin traction. The specimen was sent to pathology.    Direct pressure was applied for hemostasis. The skin was closed with 3-0 Prolene interrupted simple stitch. No bleeding was present upon the completion of the procedure. The wound was coated with antibacterial ointment. A dry sterile dressing was applied.  Total number of stitches in closure of epidermis : 3 in one site, 2 in other site    Primary provider and referring provider will be informed regarding the wound culture and tissue sample report when it returns.    Suture removal : 7-10 days.      The information in this document, created by the medical scribe for me, accurately reflects the services I personally performed and the decisions made by me. I have reviewed and approved this document for accuracy prior to leaving the patient care area.  Carol Ladd MD April 18, 2018 7:54 AM  [facility] - PRIMARY CARE SKIN

## 2018-04-24 LAB — COPATH REPORT: NORMAL

## 2018-04-30 ENCOUNTER — OFFICE VISIT (OUTPATIENT)
Dept: FAMILY MEDICINE | Facility: CLINIC | Age: 33
End: 2018-04-30
Payer: COMMERCIAL

## 2018-04-30 VITALS — SYSTOLIC BLOOD PRESSURE: 110 MMHG | DIASTOLIC BLOOD PRESSURE: 70 MMHG

## 2018-04-30 DIAGNOSIS — L63.9 AA (ALOPECIA AREATA): Primary | ICD-10-CM

## 2018-04-30 PROCEDURE — 11900 INJECT SKIN LESIONS </W 7: CPT | Performed by: FAMILY MEDICINE

## 2018-04-30 NOTE — PROGRESS NOTES
[facility] - PRIMARY CARE SKIN    CC : Hair loss   SUBJECTIVE:                                                    Adrian Gomez is a 33 year old female who is following up for alopecia areata on the scalp that was first noticed approximately 1 year ago. Due to continued development of new patches and enlargement of previous patches of hair loss, a biopsy was done at last office visit. She continues to report tenderness on the scalp from the affected areas.    Last treatment : 4/18/2018  Previous treatments :    4/18/2018 : 18 mg distributed among left frontal, occipital and right scalp.  3/21/2018 : 10 mg triamcinolone into left frontal scalp, 7 mg of triamcinolone into mid-occipital scalp, 1 mg triamcinolone into right lateral frontal scalp  1/29/2018 : 15 mg of triamcinolone into left frontal scalp, 5 mg of triamcinolone into right occipital scalp  12/20/17 : 6 mg of triamcinolone into left frontal scalp, 4 mg of triamcinolone into right occipital scalp.  11/1/17 : 3 mg of triamcinolone into left frontal scalp.  10/4/17 : 3 mg of triamcinolone into left frontal scalp.  8/29/17 : 1.5 mg of triamcinolone into left frontal scalp.    She washes her hair 2-3 times weekly and uses a leave-in conditioner.    Tissue Pathology Report from 4/18/2018         Previous labs from 8/29/2017 office visit note:  She reports that recent HGB, CBC were normal. Adrian accessed her recent lab work on her mobile phone:  Iron 49  Ferritin 20.2  Total iron binding capacity 387  TARYN, CMP, TSH done on 8/29/17    Personal Medical History  Skin Cancer : NO  Eczema Psoriasis Rosacea Autoimmune   NO NO NO NO     Family Medical History  Connective tissue disease : NO  Thyroid disease : NO  Hair Loss : She also reports hair loss in her niece at similar time, and the patient is worried about possible infection.  Skin Cancer : NO  Eczema Psoriasis Rosacea Autoimmune   NO NO NO NO     Occupation : lab work (indoor).    Patient Active Problem List    Diagnosis     CARDIOVASCULAR SCREENING; LDL GOAL LESS THAN 160     Personal history of tuberculosis     Acute cystitis without hematuria       Past Medical History:   Diagnosis Date     Personal history of tuberculosis 2004    Finished on medications for 1 year in 6/2004    Past Surgical History:   Procedure Laterality Date     None        Social History   Substance Use Topics     Smoking status: Never Smoker     Smokeless tobacco: Never Used     Alcohol use Yes      Comment: 2 drinks a month    Family History     Problem (# of Occurrences) Relation (Name,Age of Onset)    Breast Cancer (1) Mother    Family History Negative (2) Mother, Father    HEART DISEASE (4) Maternal Grandmother, Maternal Grandfather, Paternal Grandmother, Paternal Grandfather       Negative family history of: DIABETES, Hypertension               No Known Allergies     INTEGUMENTARY/SKIN: POSITIVE for hair loss  ROS : 14 point review of systems was negative except the symptoms listed above in the HPI.    This document serves as a record of the services and decisions personally performed and made by Carol Ladd MD. It was created on her behalf by Giovanny Sol, a trained medical scribe.  The creation of this document is based on the scribe's personal observations and the provider's statements to the medical scribe.  Giovanny Sol, April 30, 2018 8:15 AM      OBJECTIVE:                                                    GENERAL: healthy, alert and no distress  SKIN: Paulson Skin Type - V.  Scalp and Face were examined. The dermatoscope was used to help evaluate pigmented lesions.  Skin Pertinent Findings:  Left frontal scalp : Thin 8 mm wide linear line of hair loss most consistent with alopecia areata.  Name : Triamcinolone acetonide (Kenalog) injection.  Indication : Alopecia Areata - injection to promote hair growth in affected areas.  Location(s) : left frontal scalp.  Completed by : Carol Ladd MD  Note : Prior to the procedure, we  discussed possible hypo- and hyperpigmentation or depression of the skin post-procedure. Explained that more then one injection may be required, that these injections are distributed 4-6 weeks apart, and that up to a total of six injections may be needed.    Skin was cleansed with alcohol. 0.5 mL triamcinolone acetonide 10 mg/mL mixed with 0.5 mL of Lidocaine 2% plain.   Injected in 0.1 mL aliquots in the area of alopecia.  Total injected : 0.7 mL = 3.5 mg, distributed : into single patch.  Lot # : [id number]. Expiration Date : apr 2019.  NDC 3142-3237-37    Blanching was present during the injection. Minimal bleeding occurred. Patient left in satisfactory condition.  Treatment number : 7. This patch was not treated last time.    MDM : Discussed pathology results.      ASSESSMENT:                                                      Encounter Diagnosis   Name Primary?     AA (alopecia areata) Yes         PLAN:                                                    Patient Instructions   FUTURE APPOINTMENTS  Follow up in 2.5-3 week(s) for re-treatment.    Avoiding twisting, braiding and other tension hair styles.    SUPPLEMENTS  Take by mouth a supplement of Biotin 5000 mcg/day.    MINOXIDIL (ROGAINE) 2% INSTRUCTIONS  Apply the topical solution to the affected areas of hair loss on the scalp two hours prior to bedtime to allow the medication to soak in and dry.      Typically during the first 2-8 weeks of treatment, continued shedding may occur. Do not stop application of the medication, as this will resolve over time. This occurs because hair in the resting phase (the hair that is being shed) is gradually shifting into a growth phase.    Initially, there may also be irritation to the scalp. If irritation is not tolerable, decrease application of Rogaine to once every other day for 2 weeks; then, increase to once per day. However, if excessive irritation continues to occur with decreased application, then stop applying  Rogaine and contact me.    Other side effects may include : scaling and itchiness of the scalp.       There are two prescription strengths of Rogaine, 2% (typically used by females) and 5% (typically used by males). It has been recommended that you use the 2% strength.    Increased facial hair growth is more common in women that use higher strengths of Rogaine 5%, occuring in about 14% of women that use 5% strength. This side effect will resolve within 4 months after stopping use of Rogaine.      If Rogaine is discontinued, then the hair growth that has been stimulated will be lost.        PROCEDURES:                                                    None.    TT: 20 minutes.  CT: 15 minutes.      The information in this document, created by the medical scribe for me, accurately reflects the services I personally performed and the decisions made by me. I have reviewed and approved this document for accuracy prior to leaving the patient care area.  Carol Ladd MD April 30, 2018 8:12 AM  [facility] - PRIMARY CARE SKIN

## 2018-04-30 NOTE — MR AVS SNAPSHOT
After Visit Summary   4/30/2018    Adrian Gomez    MRN: [id number]           Patient Information     Date Of Birth          1985        Visit Information        Provider Department      4/30/2018 11:00 AM Jessenia Ladd MD [place]        Today's Diagnoses     AA (alopecia areata)    -  1      Care Instructions    FUTURE APPOINTMENTS  Follow up in 2.5-3 week(s) for re-treatment.    Avoiding twisting, braiding and other tension hair styles.    SUPPLEMENTS  Take by mouth a supplement of Biotin 5000 mcg/day.    MINOXIDIL (ROGAINE) 2% INSTRUCTIONS  Apply the topical solution to the affected areas of hair loss on the scalp two hours prior to bedtime to allow the medication to soak in and dry.      Typically during the first 2-8 weeks of treatment, continued shedding may occur. Do not stop application of the medication, as this will resolve over time. This occurs because hair in the resting phase (the hair that is being shed) is gradually shifting into a growth phase.    Initially, there may also be irritation to the scalp. If irritation is not tolerable, decrease application of Rogaine to once every other day for 2 weeks; then, increase to once per day. However, if excessive irritation continues to occur with decreased application, then stop applying Rogaine and contact me.    Other side effects may include : scaling and itchiness of the scalp.       There are two prescription strengths of Rogaine, 2% (typically used by females) and 5% (typically used by males). It has been recommended that you use the 2% strength.    Increased facial hair growth is more common in women that use higher strengths of Rogaine 5%, occuring in about 14% of women that use 5% strength. This side effect will resolve within 4 months after stopping use of Rogaine.      If Rogaine is discontinued, then the hair growth that has been stimulated will be lost.          Follow-ups after your visit         Your next 10 appointments already scheduled     Apr 30, 2018 11:00 AM CDT   Office Visit with Jessenia Ladd MD   [facility] ([facility] [place] [facility]    [address] [address] [phone]           Bring a current list of meds and any records pertaining to this visit. For Physicals, please bring immunization records and any forms needing to be filled out. Please arrive 10 minutes early to complete paperwork.            May 31, 2018  8:00 AM CDT   (Arrive by 7:45 AM)   New Patient Visit with Kirk Souza MD   [facility] ([facility])    [address]  [address] [place] [address] [phone]              Who to contact     If you have questions or need follow up information about today's clinic visit or your schedule please contact [facility] [place] directly at [phone].  Normal or non-critical lab and imaging results will be communicated to you by [employer], letter or phone within 4 business days after the clinic has received the results. If you do not hear from us within 7 days, please contact the clinic through [employer] or phone. If you have a critical or abnormal lab result, we will notify you by phone as soon as possible.  Submit refill requests through [employer] or call your pharmacy and they will forward the refill request to us. Please allow 3 business days for your refill to be completed.          Additional Information About Your Visit        [employer] Information     [employer] gives you secure access to your electronic health record. If you see a primary care provider, you can also send messages to your care team and make appointments. If you have questions, please call your primary care clinic.  If you do not have a primary care provider, please call [phone] and they will assist you.        Care EveryWhere ID     This is your Care EveryWhere ID. This could be  used by other organizations to access your [facility] medical records  [id number]         Blood Pressure from Last 3 Encounters:   04/30/18 110/70   04/18/18 107/70   03/21/18 107/72    Weight from Last 3 Encounters:   08/30/17 147 lb 3.2 oz (66.8 kg)   05/10/17 145 lb 6.4 oz (66 kg)   04/05/17 145 lb 3.2 oz (65.9 kg)              Today, you had the following     No orders found for display       Primary Care Provider Fax #    Provider Not In System [phone]                Equal Access to Services     [facility]: Hadii jaylene Arevalo, ariadne hernandez, scarlett martinez, malia donahue . So [facility] [phone].    ATENCIÓN: Si habla español, tiene a wagner disposición servicios gratuitos de asistencia lingüística. Llame al [phone].    We comply with applicable federal civil rights laws and [place] laws. We do not discriminate on the basis of race, color, national origin, age, disability, sex, sexual orientation, or gender identity.            Thank you!     Thank you for choosing [facility]  for your care. Our goal is always to provide you with excellent care. Hearing back from our patients is one way we can continue to improve our services. Please take a few minutes to complete the written survey that you may receive in the mail after your visit with us. Thank you!             Your Updated Medication List - Protect others around you: Learn how to safely use, store and throw away your medicines at www.disposemymeds.org.      Notice  As of 4/30/2018  8:52 AM    You have not been prescribed any medications.

## 2018-04-30 NOTE — LETTER
4/30/2018         RE: Adrian Gomez  [address]  [facility] [address]        Dear Colleague,    Thank you for referring your patient, Adrian Gomez, to the [facility] [place]. Please see a copy of my visit note below.    [facility] - PRIMARY CARE SKIN    CC : Hair loss   SUBJECTIVE:                                                    Adrian Gomez is a 33 year old female who is following up for alopecia areata on the scalp that was first noticed approximately 1 year ago. Due to continued development of new patches and enlargement of previous patches of hair loss, a biopsy was done at last office visit. She continues to report tenderness on the scalp from the affected areas.    Last treatment : 4/18/2018  Previous treatments :    4/18/2018 : 18 mg distributed among left frontal, occipital and right scalp.  3/21/2018 : 10 mg triamcinolone into left frontal scalp, 7 mg of triamcinolone into mid-occipital scalp, 1 mg triamcinolone into right lateral frontal scalp  1/29/2018 : 15 mg of triamcinolone into left frontal scalp, 5 mg of triamcinolone into right occipital scalp  12/20/17 : 6 mg of triamcinolone into left frontal scalp, 4 mg of triamcinolone into right occipital scalp.  11/1/17 : 3 mg of triamcinolone into left frontal scalp.  10/4/17 : 3 mg of triamcinolone into left frontal scalp.  8/29/17 : 1.5 mg of triamcinolone into left frontal scalp.    She washes her hair 2-3 times weekly and uses a leave-in conditioner.    Tissue Pathology Report from 4/18/2018         Previous labs from 8/29/2017 office visit note:  She reports that recent HGB, CBC were normal. Adrian accessed her recent lab work on her mobile phone:  Iron 49  Ferritin 20.2  Total iron binding capacity 387  TARYN, CMP, TSH done on 8/29/17    Personal Medical History  Skin Cancer : NO  Eczema Psoriasis Rosacea Autoimmune   NO NO NO NO     Family Medical History  Connective tissue disease : NO  Thyroid disease : NO  Hair Loss :  She also reports hair loss in her niece at similar time, and the patient is worried about possible infection.  Skin Cancer : NO  Eczema Psoriasis Rosacea Autoimmune   NO NO NO NO     Occupation : lab work (indoor).    Patient Active Problem List   Diagnosis     CARDIOVASCULAR SCREENING; LDL GOAL LESS THAN 160     Personal history of tuberculosis     Acute cystitis without hematuria       Past Medical History:   Diagnosis Date     Personal history of tuberculosis 2004    Finished on medications for 1 year in 6/2004    Past Surgical History:   Procedure Laterality Date     None        Social History   Substance Use Topics     Smoking status: Never Smoker     Smokeless tobacco: Never Used     Alcohol use Yes      Comment: 2 drinks a month    Family History     Problem (# of Occurrences) Relation (Name,Age of Onset)    Breast Cancer (1) Mother    Family History Negative (2) Mother, Father    HEART DISEASE (4) Maternal Grandmother, Maternal Grandfather, Paternal Grandmother, Paternal Grandfather       Negative family history of: DIABETES, Hypertension               No Known Allergies     INTEGUMENTARY/SKIN: POSITIVE for hair loss  ROS : 14 point review of systems was negative except the symptoms listed above in the HPI.    This document serves as a record of the services and decisions personally performed and made by Carol Ladd MD. It was created on her behalf by Giovanny Sol, a trained medical scribe.  The creation of this document is based on the scribe's personal observations and the provider's statements to the medical scribe.  Giovanny Sol, April 30, 2018 8:15 AM      OBJECTIVE:                                                    GENERAL: healthy, alert and no distress  SKIN: Paulson Skin Type - V.  Scalp and Face were examined. The dermatoscope was used to help evaluate pigmented lesions.  Skin Pertinent Findings:  Left frontal scalp : Thin 8 mm wide linear line of hair loss most consistent with alopecia  areata.  Name : Triamcinolone acetonide (Kenalog) injection.  Indication : Alopecia Areata - injection to promote hair growth in affected areas.  Location(s) : left frontal scalp.  Completed by : Carol Ladd MD  Note : Prior to the procedure, we discussed possible hypo- and hyperpigmentation or depression of the skin post-procedure. Explained that more then one injection may be required, that these injections are distributed 4-6 weeks apart, and that up to a total of six injections may be needed.    Skin was cleansed with alcohol. 0.5 mL triamcinolone acetonide 10 mg/mL mixed with 0.5 mL of Lidocaine 2% plain.   Injected in 0.1 mL aliquots in the area of alopecia.  Total injected : 0.7 mL = 3.5 mg, distributed : into single patch.  Lot # : [id number]. Expiration Date : apr 2019.  NDC 6667-3472-48    Blanching was present during the injection. Minimal bleeding occurred. Patient left in satisfactory condition.  Treatment number : 7. This patch was not treated last time.    MDM : Discussed pathology results.      ASSESSMENT:                                                      Encounter Diagnosis   Name Primary?     AA (alopecia areata) Yes         PLAN:                                                    Patient Instructions   FUTURE APPOINTMENTS  Follow up in 2.5-3 week(s) for re-treatment.    Avoiding twisting, braiding and other tension hair styles.    SUPPLEMENTS  Take by mouth a supplement of Biotin 5000 mcg/day.    MINOXIDIL (ROGAINE) 2% INSTRUCTIONS  Apply the topical solution to the affected areas of hair loss on the scalp two hours prior to bedtime to allow the medication to soak in and dry.      Typically during the first 2-8 weeks of treatment, continued shedding may occur. Do not stop application of the medication, as this will resolve over time. This occurs because hair in the resting phase (the hair that is being shed) is gradually shifting into a growth phase.    Initially, there may also be irritation  to the scalp. If irritation is not tolerable, decrease application of Rogaine to once every other day for 2 weeks; then, increase to once per day. However, if excessive irritation continues to occur with decreased application, then stop applying Rogaine and contact me.    Other side effects may include : scaling and itchiness of the scalp.       There are two prescription strengths of Rogaine, 2% (typically used by females) and 5% (typically used by males). It has been recommended that you use the 2% strength.    Increased facial hair growth is more common in women that use higher strengths of Rogaine 5%, occuring in about 14% of women that use 5% strength. This side effect will resolve within 4 months after stopping use of Rogaine.      If Rogaine is discontinued, then the hair growth that has been stimulated will be lost.        PROCEDURES:                                                    None.    TT: 20 minutes.  CT: 15 minutes.      The information in this document, created by the medical scribe for me, accurately reflects the services I personally performed and the decisions made by me. I have reviewed and approved this document for accuracy prior to leaving the patient care area.  Carol Ladd MD April 30, 2018 8:12 AM  [facility] - PRIMARY CARE SKIN    Again, thank you for allowing me to participate in the care of your patient.        Sincerely,        Jessenia Ladd MD

## 2018-04-30 NOTE — PATIENT INSTRUCTIONS
FUTURE APPOINTMENTS  Follow up in 2.5-3 week(s) for re-treatment.    Avoiding twisting, braiding and other tension hair styles.    SUPPLEMENTS  Take by mouth a supplement of Biotin 5000 mcg/day.    MINOXIDIL (ROGAINE) 2% INSTRUCTIONS  Apply the topical solution to the affected areas of hair loss on the scalp two hours prior to bedtime to allow the medication to soak in and dry.      Typically during the first 2-8 weeks of treatment, continued shedding may occur. Do not stop application of the medication, as this will resolve over time. This occurs because hair in the resting phase (the hair that is being shed) is gradually shifting into a growth phase.    Initially, there may also be irritation to the scalp. If irritation is not tolerable, decrease application of Rogaine to once every other day for 2 weeks; then, increase to once per day. However, if excessive irritation continues to occur with decreased application, then stop applying Rogaine and contact me.    Other side effects may include : scaling and itchiness of the scalp.       There are two prescription strengths of Rogaine, 2% (typically used by females) and 5% (typically used by males). It has been recommended that you use the 2% strength.    Increased facial hair growth is more common in women that use higher strengths of Rogaine 5%, occuring in about 14% of women that use 5% strength. This side effect will resolve within 4 months after stopping use of Rogaine.      If Rogaine is discontinued, then the hair growth that has been stimulated will be lost.

## 2018-05-09 ENCOUNTER — OFFICE VISIT (OUTPATIENT)
Dept: FAMILY MEDICINE | Facility: CLINIC | Age: 33
End: 2018-05-09
Payer: COMMERCIAL

## 2018-05-09 VITALS — SYSTOLIC BLOOD PRESSURE: 112 MMHG | HEART RATE: 67 BPM | DIASTOLIC BLOOD PRESSURE: 70 MMHG

## 2018-05-09 DIAGNOSIS — L63.9 ALOPECIA AREATA: Primary | ICD-10-CM

## 2018-05-09 PROCEDURE — 11900 INJECT SKIN LESIONS </W 7: CPT | Performed by: FAMILY MEDICINE

## 2018-05-09 NOTE — PROGRESS NOTES
[facility] - PRIMARY CARE SKIN    CC : Hair loss   SUBJECTIVE:                                                    Adrian Gomez is a 33 year old female who is following up for alopecia areata on the scalp that was first noticed approximately 1 year ago. Some hair growth has been noticed.    Last treatment : 4/30/2018  Previous treatments :    4/30/18 : 3.5 mg into new patch on left frontal scalp  4/18/2018 : 18 mg distributed among left frontal, occipital and right scalp.  3/21/2018 : 10 mg triamcinolone into left frontal scalp, 7 mg of triamcinolone into mid-occipital scalp, 1 mg triamcinolone into right lateral frontal scalp  1/29/2018 : 15 mg of triamcinolone into left frontal scalp, 5 mg of triamcinolone into right occipital scalp  12/20/17 : 6 mg of triamcinolone into left frontal scalp, 4 mg of triamcinolone into right occipital scalp.  11/1/17 : 3 mg of triamcinolone into left frontal scalp.  10/4/17 : 3 mg of triamcinolone into left frontal scalp.  8/29/17 : 1.5 mg of triamcinolone into left frontal scalp.    Tissue Pathology Report from 4/18/2018         Previous labs from 8/29/2017 office visit note:  She reports that recent HGB, CBC were normal. Adrian accessed her recent lab work on her mobile phone:  Iron 49  Ferritin 20.2  Total iron binding capacity 387  TARYN, CMP, TSH done on 8/29/17    Personal Medical History  Skin Cancer : NO  Eczema Psoriasis Rosacea Autoimmune   NO NO NO NO     Family Medical History  Connective tissue disease : NO  Thyroid disease : NO  Hair Loss : She also reports hair loss in her niece at similar time, and the patient is worried about possible infection.  Skin Cancer : NO  Eczema Psoriasis Rosacea Autoimmune   NO NO NO NO     Occupation : lab work (indoor).    Patient Active Problem List   Diagnosis     CARDIOVASCULAR SCREENING; LDL GOAL LESS THAN 160     Personal history of tuberculosis     Acute cystitis without hematuria       Past Medical History:   Diagnosis Date      Personal history of tuberculosis 2004    Finished on medications for 1 year in 6/2004    Past Surgical History:   Procedure Laterality Date     None        Social History   Substance Use Topics     Smoking status: Never Smoker     Smokeless tobacco: Never Used     Alcohol use Yes      Comment: 2 drinks a month    Family History     Problem (# of Occurrences) Relation (Name,Age of Onset)    Breast Cancer (1) Mother    Family History Negative (2) Mother, Father    HEART DISEASE (4) Maternal Grandmother, Maternal Grandfather, Paternal Grandmother, Paternal Grandfather       Negative family history of: DIABETES, Hypertension               No Known Allergies     INTEGUMENTARY/SKIN: POSITIVE for hair loss  ROS : 14 point review of systems was negative except the symptoms listed above in the HPI.    This document serves as a record of the services and decisions personally performed and made by Carol Ladd MD. It was created on her behalf by Giovanny Sol, a trained medical scribe.  The creation of this document is based on the scribe's personal observations and the provider's statements to the medical scribe.  Giovanny Sol, May 9, 2018 7:53 AM      OBJECTIVE:                                                    GENERAL: healthy, alert and no distress  SKIN: Paulson Skin Type - V.  Scalp and Face were examined. The dermatoscope was used to help evaluate pigmented lesions.  Skin Pertinent Findings:  Right occipital scalp : 7 x 4 mm in size patch of hair loss. Evidence of new, short hair growth.  Midline frontal scalp 12 mm patch  Left side of scalp : new hair growth  New hair growth visible at all four sites on occipital scalp, side of scalp. No skin atrophy.  Healing biopsy site.  Name : Triamcinolone acetonide (Kenalog) injection.  Indication : Alopecia Areata - injection to promote hair growth in affected areas.  Location(s) : left frontal scalp.  Completed by : Carol Ladd MD  Note : Prior to the procedure, we discussed  possible hypo- and hyperpigmentation or depression of the skin post-procedure. Explained that more then one injection may be required, that these injections are distributed 4-6 weeks apart, and that up to a total of six injections may be needed.    Skin was cleansed with alcohol. 2.0 mL triamcinolone acetonide 10 mg/mL drawn up and injected in 0.1 mL aliquots in the area of alopecia.  Total injected :   0.8 mL = 8 mg into right occipital scalp patch of alopecia and into midline frontal scalp patch of alopecia  1.0 mL = 10 mg into periphery of left side of scalp patch of alopecia and into left lower side of scalp patch of alopecia  Lot # : [id number]. Expiration Date : Apr 2019.  NDC : 8177-6486-72    Blanching was present during the injection. Minimal bleeding occurred. Patient left in satisfactory condition.  Treatment number : 8            ASSESSMENT:                                                      Encounter Diagnosis   Name Primary?     Alopecia areata Yes         PLAN:                                                    Patient Instructions   FUTURE APPOINTMENTS  Follow up in 4-6 weeks.    Avoid coloring hair at this time.        PROCEDURES:                                                    None.    TT: 20 minutes.  CT: 15 minutes.      The information in this document, created by the medical scribe for me, accurately reflects the services I personally performed and the decisions made by me. I have reviewed and approved this document for accuracy prior to leaving the patient care area.  Carol Ladd MD May 9, 2018 7:52 AM  [facility] - PRIMARY CARE SKIN

## 2018-05-09 NOTE — LETTER
5/9/2018         RE: Adrian Gomez  [address]  [facility] [address]        Dear Colleague,    Thank you for referring your patient, Adrian Gomez, to the [facility] [place]. Please see a copy of my visit note below.    [facility] - PRIMARY CARE SKIN    CC : Hair loss   SUBJECTIVE:                                                    Adrian Gomez is a 33 year old female who is following up for alopecia areata on the scalp that was first noticed approximately 1 year ago. Some hair growth has been noticed.    Last treatment : 4/30/2018  Previous treatments :    4/30/18 : 3.5 mg into new patch on left frontal scalp  4/18/2018 : 18 mg distributed among left frontal, occipital and right scalp.  3/21/2018 : 10 mg triamcinolone into left frontal scalp, 7 mg of triamcinolone into mid-occipital scalp, 1 mg triamcinolone into right lateral frontal scalp  1/29/2018 : 15 mg of triamcinolone into left frontal scalp, 5 mg of triamcinolone into right occipital scalp  12/20/17 : 6 mg of triamcinolone into left frontal scalp, 4 mg of triamcinolone into right occipital scalp.  11/1/17 : 3 mg of triamcinolone into left frontal scalp.  10/4/17 : 3 mg of triamcinolone into left frontal scalp.  8/29/17 : 1.5 mg of triamcinolone into left frontal scalp.    Tissue Pathology Report from 4/18/2018         Previous labs from 8/29/2017 office visit note:  She reports that recent HGB, CBC were normal. Adrian accessed her recent lab work on her mobile phone:  Iron 49  Ferritin 20.2  Total iron binding capacity 387  TARYN, CMP, TSH done on 8/29/17    Personal Medical History  Skin Cancer : NO  Eczema Psoriasis Rosacea Autoimmune   NO NO NO NO     Family Medical History  Connective tissue disease : NO  Thyroid disease : NO  Hair Loss : She also reports hair loss in her niece at similar time, and the patient is worried about possible infection.  Skin Cancer : NO  Eczema Psoriasis Rosacea Autoimmune   NO NO NO NO      Occupation : lab work (indoor).    Patient Active Problem List   Diagnosis     CARDIOVASCULAR SCREENING; LDL GOAL LESS THAN 160     Personal history of tuberculosis     Acute cystitis without hematuria       Past Medical History:   Diagnosis Date     Personal history of tuberculosis 2004    Finished on medications for 1 year in 6/2004    Past Surgical History:   Procedure Laterality Date     None        Social History   Substance Use Topics     Smoking status: Never Smoker     Smokeless tobacco: Never Used     Alcohol use Yes      Comment: 2 drinks a month    Family History     Problem (# of Occurrences) Relation (Name,Age of Onset)    Breast Cancer (1) Mother    Family History Negative (2) Mother, Father    HEART DISEASE (4) Maternal Grandmother, Maternal Grandfather, Paternal Grandmother, Paternal Grandfather       Negative family history of: DIABETES, Hypertension               No Known Allergies     INTEGUMENTARY/SKIN: POSITIVE for hair loss  ROS : 14 point review of systems was negative except the symptoms listed above in the HPI.    This document serves as a record of the services and decisions personally performed and made by Carol Ladd MD. It was created on her behalf by Giovanny Sol, a trained medical scribe.  The creation of this document is based on the scribe's personal observations and the provider's statements to the medical scribe.  Giovanny Sol, May 9, 2018 7:53 AM      OBJECTIVE:                                                    GENERAL: healthy, alert and no distress  SKIN: Paulson Skin Type - V.  Scalp and Face were examined. The dermatoscope was used to help evaluate pigmented lesions.  Skin Pertinent Findings:  Right occipital scalp : 7 x 4 mm in size patch of hair loss. Evidence of new, short hair growth.  Midline frontal scalp 12 mm patch  Left side of scalp : new hair growth  New hair growth visible at all four sites on occipital scalp, side of scalp. No skin atrophy.  Healing biopsy  site.  Name : Triamcinolone acetonide (Kenalog) injection.  Indication : Alopecia Areata - injection to promote hair growth in affected areas.  Location(s) : left frontal scalp.  Completed by : Carol Ladd MD  Note : Prior to the procedure, we discussed possible hypo- and hyperpigmentation or depression of the skin post-procedure. Explained that more then one injection may be required, that these injections are distributed 4-6 weeks apart, and that up to a total of six injections may be needed.    Skin was cleansed with alcohol. 2.0 mL triamcinolone acetonide 10 mg/mL drawn up and injected in 0.1 mL aliquots in the area of alopecia.  Total injected :   0.8 mL = 8 mg into right occipital scalp patch of alopecia and into midline frontal scalp patch of alopecia  1.0 mL = 10 mg into periphery of left side of scalp patch of alopecia and into left lower side of scalp patch of alopecia  Lot # : [id number]. Expiration Date : Apr 2019.  NDC : 4790-3584-76    Blanching was present during the injection. Minimal bleeding occurred. Patient left in satisfactory condition.  Treatment number : 8            ASSESSMENT:                                                      Encounter Diagnosis   Name Primary?     Alopecia areata Yes         PLAN:                                                    Patient Instructions   FUTURE APPOINTMENTS  Follow up in 4-6 weeks.    Avoid coloring hair at this time.        PROCEDURES:                                                    None.    TT: 20 minutes.  CT: 15 minutes.      The information in this document, created by the medical scribe for me, accurately reflects the services I personally performed and the decisions made by me. I have reviewed and approved this document for accuracy prior to leaving the patient care area.  Carol Ladd MD May 9, 2018 7:52 AM  [facility] - PRIMARY CARE SKIN    Again, thank you for allowing me to participate in the care of your patient.         Sincerely,        Jessenia Ladd MD

## 2018-05-09 NOTE — MR AVS SNAPSHOT
After Visit Summary   5/9/2018    Adrian Gomez    MRN: [id number]           Patient Information     Date Of Birth          1985        Visit Information        Provider Department      5/9/2018 7:40 AM Jessenia Ladd MD [facility] [place]        Today's Diagnoses     Alopecia areata    -  1      Care Instructions    FUTURE APPOINTMENTS  Follow up in 4-6 weeks.    Avoid coloring hair at this time.          Follow-ups after your visit        Your next 10 appointments already scheduled     May 31, 2018  8:00 AM CDT   (Arrive by 7:45 AM)   New Patient Visit with Kirk Souza MD   [facility] Dermatology ([facility] [place])    [address]  [address] [facility] [address] [phone]              Who to contact     If you have questions or need follow up information about today's clinic visit or your schedule please contact [facility] directly at [phone].  Normal or non-critical lab and imaging results will be communicated to you by [employer], letter or phone within 4 business days after the clinic has received the results. If you do not hear from us within 7 days, please contact the clinic through [employer] or phone. If you have a critical or abnormal lab result, we will notify you by phone as soon as possible.  Submit refill requests through [employer] or call your pharmacy and they will forward the refill request to us. Please allow 3 business days for your refill to be completed.          Additional Information About Your Visit        MyChart Information     [employer] gives you secure access to your electronic health record. If you see a primary care provider, you can also send messages to your care team and make appointments. If you have questions, please call your primary care clinic.  If you do not have a primary care provider, please call [phone] and they will assist you.        Care EveryWhere ID     This is  your Care EveryWhere ID. This could be used by other organizations to access your [facility] medical records  [id number]        Your Vitals Were     Pulse                   67            Blood Pressure from Last 3 Encounters:   05/09/18 112/70   04/30/18 110/70   04/18/18 107/70    Weight from Last 3 Encounters:   08/30/17 147 lb 3.2 oz (66.8 kg)   05/10/17 145 lb 6.4 oz (66 kg)   04/05/17 145 lb 3.2 oz (65.9 kg)              Today, you had the following     No orders found for display       Primary Care Provider Fax #    Provider Not In System [phone]                Equal Access to Services     AFIA CAICEDO : Hadii jaylene Arevalo, ariadne hernandez, scarlett moranmajesus martinez, malia donahue . So [facility] [phone].    ATENCIÓN: Si habla español, tiene a wagner disposición servicios gratuitos de asistencia lingüística. Llame al [phone].    We comply with applicable federal civil rights laws and [place] laws. We do not discriminate on the basis of race, color, national origin, age, disability, sex, sexual orientation, or gender identity.            Thank you!     Thank you for choosing [facility]  for your care. Our goal is always to provide you with excellent care. Hearing back from our patients is one way we can continue to improve our services. Please take a few minutes to complete the written survey that you may receive in the mail after your visit with us. Thank you!             Your Updated Medication List - Protect others around you: Learn how to safely use, store and throw away your medicines at www.disposemymeds.org.      Notice  As of 5/9/2018  8:00 AM    You have not been prescribed any medications.

## 2018-05-31 ENCOUNTER — OFFICE VISIT (OUTPATIENT)
Dept: DERMATOLOGY | Facility: CLINIC | Age: 33
End: 2018-05-31
Payer: COMMERCIAL

## 2018-05-31 VITALS — DIASTOLIC BLOOD PRESSURE: 71 MMHG | SYSTOLIC BLOOD PRESSURE: 107 MMHG | HEART RATE: 67 BPM

## 2018-05-31 DIAGNOSIS — E55.9 HYPOVITAMINOSIS D: ICD-10-CM

## 2018-05-31 DIAGNOSIS — L21.9 DERMATITIS, SEBORRHEIC: ICD-10-CM

## 2018-05-31 DIAGNOSIS — L63.9 AA (ALOPECIA AREATA): ICD-10-CM

## 2018-05-31 DIAGNOSIS — L63.9 AA (ALOPECIA AREATA): Primary | ICD-10-CM

## 2018-05-31 LAB
DEPRECATED CALCIDIOL+CALCIFEROL SERPL-MC: 24 UG/L (ref 20–75)
ERYTHROCYTE [DISTWIDTH] IN BLOOD BY AUTOMATED COUNT: 12.1 % (ref 10–15)
FERRITIN SERPL-MCNC: 27 NG/ML (ref 12–150)
HCT VFR BLD AUTO: 43.1 % (ref 35–47)
HGB BLD-MCNC: 13.9 G/DL (ref 11.7–15.7)
MCH RBC QN AUTO: 29.3 PG (ref 26.5–33)
MCHC RBC AUTO-ENTMCNC: 32.3 G/DL (ref 31.5–36.5)
MCV RBC AUTO: 91 FL (ref 78–100)
PLATELET # BLD AUTO: 216 10E9/L (ref 150–450)
RBC # BLD AUTO: 4.75 10E12/L (ref 3.8–5.2)
TSH SERPL DL<=0.005 MIU/L-ACNC: 2.63 MU/L (ref 0.4–4)
WBC # BLD AUTO: 4.6 10E9/L (ref 4–11)

## 2018-05-31 RX ORDER — FLUOCINOLONE ACETONIDE 0.11 MG/ML
OIL TOPICAL
Qty: 118.28 ML | Refills: 1 | Status: SHIPPED | OUTPATIENT
Start: 2018-05-31 | End: 2018-08-02

## 2018-05-31 RX ORDER — KETOCONAZOLE 20 MG/ML
SHAMPOO TOPICAL DAILY PRN
Qty: 120 ML | Refills: 3 | Status: SHIPPED | OUTPATIENT
Start: 2018-05-31 | End: 2018-10-30

## 2018-05-31 RX ORDER — FLUOCINOLONE ACETONIDE 0.11 MG/ML
OIL TOPICAL
Qty: 118.28 ML | Refills: 1 | Status: SHIPPED | OUTPATIENT
Start: 2018-05-31 | End: 2018-05-31

## 2018-05-31 ASSESSMENT — PAIN SCALES - GENERAL
PAINLEVEL: NO PAIN (1)
PAINLEVEL: NO PAIN (0)

## 2018-05-31 NOTE — PROGRESS NOTES
[facility] Dermatology Note      Dermatology Problem List:  1.Alopecia areata  -S/p ILK, most recently 0.2 cc of 5 mg/cc into vertex scalp  -Dermasmoothe FS oil qWeek  -2% Ketoconazole shampoo    Encounter Date: May 31, 2018    CC:   Chief Complaint   Patient presents with     Hair Loss     Adrian is visiting to discuss hair loss. She would like to transfer care from Dr. Bar r/t distance.         History of Present Illness:  Ms. Adrian Gomez is a 33 year old female who presents for evaluation of hair loss.  The patient has been seen by Dr. Bar in [facility] for hairloss. She wants to transfer her care here due to location.  The patient has biopsy proven alopecia areata.   Currently she has 3 patches of hairloss. She says the patches of alopecia are getting larger. When she gets the injections she does report hair regrowth. The patient is not using topical steroids.   The patient has occasional itchy scalp. No other symptoms. No recent emotional or physical stressors--including infection or surgery. TSH checked last year was WNL.  No other skin related concerns.    Past Medical History:   Patient Active Problem List   Diagnosis     CARDIOVASCULAR SCREENING; LDL GOAL LESS THAN 160     Personal history of tuberculosis     Acute cystitis without hematuria     Past Medical History:   Diagnosis Date     Personal history of tuberculosis 2004    Finished on medications for 1 year in 6/2004     Past Surgical History:   Procedure Laterality Date     None         Social History:  The patient works as a .     Family History:  No FH of alopecia    Medications:  No current outpatient prescriptions on file.        No Known Allergies      Review of Systems:  -As per HPI  -Skin: As above in HPI. No additional skin concerns.    Physical exam:  Vitals: /71  Pulse 67  GEN: This is a well developed, well-nourished female in no acute distress, in a pleasant mood.    SKIN: Focused examination of the face  and scalp was performed.  -3 patches of alopecia with hair regrowth in all of them; one over vertex scalp, another over the left pariental scalp and the other on the left parietal scalp. No underlying rash. Mild follicular accentuation. Hair pull test negative.  -No other lesions of concern on areas examined.     Impression/Plan:  1. Alopecia areata- The patient has been treated with ILK in the past (most recently on 4/31/18). She has hair regrowth in prior injected areas. She still has 3 small patches of alopecia. She wuold prefer to treat with topical steroids at this time but would like ILK into one of the patches.     Kenalog intralesional injection procedure note: After verbal consent and discussion of risks including but not limited to atrophy, pain, and bruising, time out was performed, the patient underwent positioning and the area was prepped with isopropyl alcohol, 0.2 total cc of Kenalog 10 mg/cc was injected into 1 site on the vertex scalp.  The patient tolerated the procedure well and left the Dermatology clinic in good condition.      Dermasmooth 0.01% oil qWeek; for new developing areas of alopecia can apply up to twice a day as needed    May consider ILK at next visit pending response to topical steroids    2% Ketoconazole shampoo for possible mild underlying seborrheic dermatitis    Ordered CBC, ferritin, vitamin D, and TSH        CC Dr. Brown on close of this encounter.  Follow-up in 2 months, earlier for new or changing lesions.       Dr. Brown staffed the patient.    Staff Involved:  Resident(Kirk Souza)/Staff(as above)      Patient was seen and examined with the medicine/dermatology resident. I agree with the history, review of systems, physical examination, assessments and plan. I was present for the key portion of the ILK procedure.    Bree Brown MD  Professor and  Chair  Department of Dermatology  [facility]

## 2018-05-31 NOTE — PATIENT INSTRUCTIONS
Use Derma-smoothe oil once a week to scalp  For new developing patches of hair loss use up to twice a day as needed and apply surrounding patch of hair loss 1 inch out    Use ketoconazole shampoo to scalp when showering    Return to clinic in 2 months

## 2018-05-31 NOTE — LETTER
5/31/2018       RE: Adrian Gomez  [address]  [place] [facility] [address]     Dear Colleague,    Thank you for referring your patient, Adrian Gomez, to the [facility] DERMATOLOGY at [facility]. Please see a copy of my visit note below.    [facility] Dermatology Note      Dermatology Problem List:  1.Alopecia areata  -S/p ILK, most recently 0.2 cc of 5 mg/cc into vertex scalp  -Derma-smoothe oil qWeek  -Ketoconazole shampoo    Encounter Date: May 31, 2018    CC:   Chief Complaint   Patient presents with     Hair Loss     Adrian is visiting to discuss hair loss. She would like to transfer care from Dr. Bar r/t distance.         History of Present Illness:  Ms. Adrian Gomez is a 33 year old female who presents for evaluation of hair loss.  The patient has been seen by Dr. Bar in [place] for hairloss. She wants to transfer her care here due to location.  The patient has biopsy proven alopecia areata.   Currently she has 3 patches of hairloss. She says the patches of alopecia are getting larger. When she gets the injections she does report hair regrowth. The patient is not using topical steroids.   The patient has occasional itchy scalp. No other symptoms. No recent emotional or physical stressors--including infection or surgery. TSH checked last year was WNL.  No other skin related concerns.    Past Medical History:   Patient Active Problem List   Diagnosis     CARDIOVASCULAR SCREENING; LDL GOAL LESS THAN 160     Personal history of tuberculosis     Acute cystitis without hematuria     Past Medical History:   Diagnosis Date     Personal history of tuberculosis 2004    Finished on medications for 1 year in 6/2004     Past Surgical History:   Procedure Laterality Date     None         Social History:  The patient works as a .     Family History:  No FH of alopecia    Medications:  No current outpatient prescriptions on file.        No Known  Allergies      Review of Systems:  -As per HPI  -Skin: As above in HPI. No additional skin concerns.    Physical exam:  Vitals: /71  Pulse 67  GEN: This is a well developed, well-nourished female in no acute distress, in a pleasant mood.    SKIN: Focused examination of the face and scalp was performed.  -3 patches of alopecia with hair regrowth in all of them; one over vertex scalp, another over the left pariental scalp and the other on the left parietal scalp. No underlying rash. Mild follicular accentuation. Hair pull test negative.  -No other lesions of concern on areas examined.     Impression/Plan:  1. Alopecia areata- The patient has been treated with ILK in the past (most recently on 4/31/18). She has hair regrowth in prior injected areas. She still has 3 small patches of alopecia. She wuold prefer to treat with topical steroids at this time but would like ILK into one of the patches.     Kenalog intralesional injection procedure note: After verbal consent and discussion of risks including but not limited to atrophy, pain, and bruising, time out was performed, the patient underwent positioning and the area was prepped with isopropyl alcohol, 0.2 total cc of Kenalog 10 mg/cc was injected into 1 site(s) on the vertex scalp.  The patient tolerated the procedure well and left the Dermatology clinic in good condition.      Dermasmooth 0.01% oil qWeek; for new developing areas of alopecia can apply up to twice a day as needed    May consider ILK at next visit pending response to topical steroids    Ketoconazole shampoo for possible mild underlying seborrheic dermatitis    Ordered CBC, ferritin, vitamin D, and TSH        CC Dr. Brown on close of this encounter.  Follow-up in 2 months, earlier for new or changing lesions.       Dr. Brown staffed the patient.    Staff Involved:  Resident(Kirk Souza)/Staff(as above)      Again, thank you for allowing me to participate in the care of your  patient.      Sincerely,    Kirk Souza MD

## 2018-05-31 NOTE — NURSING NOTE
Dermatology Rooming Note    Adrian Gomez's goals for this visit include:   Chief Complaint   Patient presents with     Hair Loss     Adrian is visiting to discuss hair loss. She would like to transfer care from Dr. Bar r/t [place].     Joy Hunt LPN

## 2018-05-31 NOTE — MR AVS SNAPSHOT
After Visit Summary   5/31/2018    Adrian Gomez    MRN: [id number]           Patient Information     Date Of Birth          1985        Visit Information        Provider Department      5/31/2018 8:00 AM Kirk Souza MD [facility] Dermatology        Today's Diagnoses     AA (alopecia areata)    -  1    Hypovitaminosis D          Care Instructions    Use Derma-smoothe oil once a week to scalp  For new developing patches of hair loss use up to twice a day as needed and apply surrounding patch of hair loss 1 inch out    Use ketoconazole shampoo to scalp when showering    Return to clinic in 2 months          Follow-ups after your visit        Follow-up notes from your care team     Return in about 2 months (around 7/31/2018).      Your next 10 appointments already scheduled     Jun 20, 2018  8:00 AM CDT   Office Visit with Jessenia Ladd MD   [facility] ([facility])    [address] [place] [address] [phone]           Bring a current list of meds and any records pertaining to this visit. For Physicals, please bring immunization records and any forms needing to be filled out. Please arrive 10 minutes early to complete paperwork.              Future tests that were ordered for you today     Open Future Orders        Priority Expected Expires Ordered    Ferritin Routine  5/31/2019 5/31/2018    Vitamin D Deficiency Routine  5/31/2019 5/31/2018    TSH with free T4 reflex Routine  5/31/2019 5/31/2018    CBC with platelets Routine  5/31/2019 5/31/2018            Who to contact     Please call your clinic at [phone] to:    Ask questions about your health    Make or cancel appointments    Discuss your medicines    Learn about your test results    Speak to your doctor            Additional Information About Your Visit        MyChart Information     [employer] gives you secure access to your electronic health record. If you  see a primary care provider, you can also send messages to your care team and make appointments. If you have questions, please call your primary care clinic.  If you do not have a primary care provider, please call [phone] and they will assist you.      [email or web address] is an electronic gateway that provides easy, online access to your medical records. With [email or web address], you can request a clinic appointment, read your test results, renew a prescription or communicate with your care team.     To access your existing account, please contact your [facility] or call [phone] for assistance.        Care EveryWhere ID     This is your Care EveryWhere ID. This could be used by other organizations to access your [place] medical records  [id number]        Your Vitals Were     Pulse                   67            Blood Pressure from Last 3 Encounters:   05/31/18 107/71   05/09/18 112/70   04/30/18 110/70    Weight from Last 3 Encounters:   08/30/17 66.8 kg (147 lb 3.2 oz)   05/10/17 66 kg (145 lb 6.4 oz)   04/05/17 65.9 kg (145 lb 3.2 oz)              We Performed the Following     INJECTION INTO SKIN LESIONS <=7          Today's Medication Changes          These changes are accurate as of 5/31/18  8:52 AM.  If you have any questions, ask your nurse or doctor.               Start taking these medicines.        Dose/Directions    fluocinolone acetonide 0.01 % oil   Commonly known as:  DERMA-SMOOTHE/FS BODY   Used for:  AA (alopecia areata)   Started by:  Kirk Souza MD        Apply   Quantity:  118.28 mL   Refills:  1       ketoconazole 2 % shampoo   Commonly known as:  NIZORAL   Used for:  AA (alopecia areata)   Started by:  Kirk Souza MD        Apply topically daily as needed for itching or irritation   Quantity:  120 mL   Refills:  3       triamcinolone acetonide 10 MG/ML injection   Commonly known as:  KENALOG   Used for:  AA (alopecia areata)   Started by:   Kirk Souza MD        Dose:  5 mg   Inject 0.5 mLs (5 mg) into the skin once for 1 dose   Quantity:  0.5 mL   Refills:  0            Where to get your medicines      These medications were sent to [facility] [place] - [address]  [address], [place] [address]     Phone:  [phone]     fluocinolone acetonide 0.01 % oil    ketoconazole 2 % shampoo         Some of these will need a paper prescription and others can be bought over the counter.  Ask your nurse if you have questions.     You don't need a prescription for these medications     triamcinolone acetonide 10 MG/ML injection                Primary Care Provider Fax #    Provider Not In System [phone]                Equal Access to Services     DAISY CAICEDO : Sherry Arevalo, wamahi ellisqdorian, qaybta kaalmada michelle, malia donahue . So [facility] [phone].    ATENCIÓN: Si habla español, tiene a wagner disposición servicios gratuitos de asistencia lingüística. Llame al [phone].    We comply with applicable federal civil rights laws and [place] laws. We do not discriminate on the basis of race, color, national origin, age, disability, sex, sexual orientation, or gender identity.            Thank you!     Thank you for choosing [facility] DERMATOLOGY  for your care. Our goal is always to provide you with excellent care. Hearing back from our patients is one way we can continue to improve our services. Please take a few minutes to complete the written survey that you may receive in the mail after your visit with us. Thank you!             Your Updated Medication List - Protect others around you: Learn how to safely use, store and throw away your medicines at www.disposemymeds.org.          This list is accurate as of 5/31/18  8:52 AM.  Always use your most recent med list.                   Brand Name Dispense Instructions for use Diagnosis     fluocinolone acetonide 0.01 % oil    DERMA-SMOOTHE/FS BODY    118.28 mL    Apply    AA (alopecia areata)       ketoconazole 2 % shampoo    NIZORAL    120 mL    Apply topically daily as needed for itching or irritation    AA (alopecia areata)       triamcinolone acetonide 10 MG/ML injection    KENALOG    0.5 mL    Inject 0.5 mLs (5 mg) into the skin once for 1 dose    AA (alopecia areata)

## 2018-05-31 NOTE — NURSING NOTE
Drug Administration Record    Drug Name: triamcinolone acetonide(kenalog)  Dose: 0.5mL of triamcinolone 5mg/mL, 5mg dose  Route administered: ID  NDC #: Kenalog-10 (7717-6403-60)  Amount of waste(mL):4.5  Reason for waste: Single use vial    LOT #: [id number]   SITE: [facility]  : [employer]  EXPIRATION DATE: DEC2019

## 2018-06-16 PROBLEM — L21.9 DERMATITIS, SEBORRHEIC: Status: ACTIVE | Noted: 2018-06-16

## 2018-07-17 ENCOUNTER — HEALTH MAINTENANCE LETTER (OUTPATIENT)
Age: 33
End: 2018-07-17

## 2018-08-02 ENCOUNTER — OFFICE VISIT (OUTPATIENT)
Dept: DERMATOLOGY | Facility: CLINIC | Age: 33
End: 2018-08-02
Payer: COMMERCIAL

## 2018-08-02 DIAGNOSIS — L63.9 AA (ALOPECIA AREATA): ICD-10-CM

## 2018-08-02 RX ORDER — FLUOCINOLONE ACETONIDE 0.11 MG/ML
OIL TOPICAL
Qty: 118.28 ML | Refills: 1 | Status: SHIPPED | OUTPATIENT
Start: 2018-08-02 | End: 2018-10-30

## 2018-08-02 ASSESSMENT — PAIN SCALES - GENERAL: PAINLEVEL: NO PAIN (0)

## 2018-08-02 NOTE — PATIENT INSTRUCTIONS
Take OTC iron supplement  Continue vitamin D supplement  Use dermasmoothe oil all over scalp 1x/week  For the area on the left scalp use dermasmoothe 2-3x/week  RTC in 2 months

## 2018-08-02 NOTE — PROGRESS NOTES
[facility] Dermatology Note      Dermatology Problem List:  1.Alopecia areata  -S/p ILK   -0.2 cc of 5 mg/cc into vertex scalp 5/31/18   -1 ml of 10 mg/cc into occipital, vertex, and left parietal scalp  -Dermasmoothe FS oil qWeek, and BIW-TIW left parietal scalp  -2% Ketoconazole shampoo    Encounter Date: Aug 2, 2018    CC:   No chief complaint on file.        History of Present Illness:  Ms. Adrian Gomez is a 33 year old female who presents as a follow-up for AA. The patient was last seen 5/31/18 when she had ILK to 3 small patches of alopecia and started on Dermasmooth 0.01% oil qWeek BID PRN.   The patient doesn't feel like her AA has improved. No symptoms to the scalp. Her labs were pertinent for a mildly low-normal vitamin D, low normal ferritin, and is taking vitamin D supplementation OTC. She is not taking iron supplementation.   She ius using he Dermasmooth 1x/week. She uses ketoconazole shampoo now about 2x/week (previously she was using daily).        Past Medical History:   Patient Active Problem List   Diagnosis     CARDIOVASCULAR SCREENING; LDL GOAL LESS THAN 160     Personal history of tuberculosis     Acute cystitis without hematuria     Dermatitis, seborrheic     Past Medical History:   Diagnosis Date     Personal history of tuberculosis 2004    Finished on medications for 1 year in 6/2004     Past Surgical History:   Procedure Laterality Date     None            Social History:  The patient works as a .      Family History:  No FH of alopecia    Medications:  Current Outpatient Prescriptions   Medication Sig Dispense Refill     fluocinolone acetonide (DERMA-SMOOTHE/FS BODY) 0.01 % oil Apply to areas of hair loss once a week 118.28 mL 1     ketoconazole (NIZORAL) 2 % shampoo Apply topically daily as needed for itching or irritation 120 mL 3        No Known Allergies      Review of Systems:  -As per HPI  -Constitutional: The patient denies fatigue, fevers, chills,  unintended weight loss, and night sweats.  -HEENT: Patient denies nonhealing oral sores.  -Skin: As above in HPI. No additional skin concerns.    Physical exam:  Vitals: There were no vitals taken for this visit.  GEN: This is a well developed, well-nourished female in no acute distress, in a pleasant mood.    SKIN: Focused examination of the face and scalp was performed.  -There is mild macular erythema of the scalp with mild flaky white scale.  -The patient has 3-4 patches of alopecia on the occipital scalp, left parietal scalp, and crown/vertex. The patient has hair regrowth in all of these patches, with improvement of occipital and crown, but stable over left parietal scalp.  -No other lesions of concern on areas examined.     Impression/Plan:  1. Alopecia areata    Kenalog intralesional injection procedure note: After verbal consent and discussion of risks including but not limited to atrophy, pain, and bruising, time out was performed, the patient underwent positioning and the area was prepped with isopropyl alcohol, 1 total cc of Kenalog 10 mg/cc was injected into 20 site(s) on the crown, left parietal scalp, and occipital scalp.  The patient tolerated the procedure well and left the Dermatology clinic in good condition.      Take OTC iron supplement (ferritin should be over 45)    Continue vitamin D supplement (she was low-normal on labs last time)    Can recheck vitamin D and ferritin in a couple of months    Use dermasmoothe oil all over scalp 1x/week    For the area on the left scalp use dermasmoothe 2-3x/week      CC Dr. Brown on close of this encounter.  Follow-up in 2 months, earlier for new or changing lesions.       Staff Involved:  Resident(Kirk Souza)/Staff(as above)    Staff Physician Comments:  I saw and evaluated the patient with the resident and I agree with the assessment and plan as documented in the resident's note. I was present for the minor procedure.    Sylvester MAYER  MD Daisy  Professor  Head of Dermato-Allergy Division  Department of Dermatology  [facility]

## 2018-08-02 NOTE — NURSING NOTE
"Dermatology Rooming Note    Adrian Gomez's goals for this visit include:   Chief Complaint   Patient presents with     Hair Loss     Adrian is here today for a 2 month hair loss follow up. Adrian notes\" it not getting better\"        GREG Spaulding    "

## 2018-08-02 NOTE — MR AVS SNAPSHOT
After Visit Summary   8/2/2018    Adrian Gomez    MRN: [id number]           Patient Information     Date Of Birth          1985        Visit Information        Provider Department      8/2/2018 7:30 AM Kirk Souza MD [facility]        Care Instructions    Take OTC iron supplement  Continue vitamin D supplement  Use dermasmoothe oil all over scalp 1x/week  For the area on the left scalp use dermasmoothe 2-3x/week  RTC in 2 months          Follow-ups after your visit        Follow-up notes from your care team     Return in about 2 months (around 10/2/2018).      Who to contact     Please call your clinic at [phone] to:    Ask questions about your health    Make or cancel appointments    Discuss your medicines    Learn about your test results    Speak to your doctor            Additional Information About Your Visit        MyChart Information     [employer]oggleBug gives you secure access to your electronic health record. If you see a primary care provider, you can also send messages to your care team and make appointments. If you have questions, please call your primary care clinic.  If you do not have a primary care provider, please call [phone] and they will assist you.      JoggleBug is an electronic gateway that provides easy, online access to your medical records. With JoggleBug, you can request a clinic appointment, read your test results, renew a prescription or communicate with your care team.     To access your existing account, please contact your [facility] or call [phone] for assistance.        Care EveryWhere ID     This is your Care EveryWhere ID. This could be used by other organizations to access your [place] medical records  [id number]         Blood Pressure from Last 3 Encounters:   05/31/18 107/71   05/09/18 112/70   04/30/18 110/70    Weight from Last 3 Encounters:   08/30/17 66.8 kg (147 lb 3.2 oz)   05/10/17 66 kg (145 lb  6.4 oz)   04/05/17 65.9 kg (145 lb 3.2 oz)              Today, you had the following     No orders found for display       Primary Care Provider Fax #    Physician No Ref-Primary [phone]       No address on file        Equal Access to Services     DAISY CAICEDO : Sherry mariee mak neda Sofer, waboda luqadaha, qaybta kaalmada michelle, malia page labrycolton . So [facility] [phone].    ATENCIÓN: Si habla español, tiene a wagner disposición servicios gratuitos de asistencia lingüística. Llame al [phone].    We comply with applicable federal civil rights laws and [place] laws. We do not discriminate on the basis of race, color, national origin, age, disability, sex, sexual orientation, or gender identity.            Thank you!     Thank you for choosing [facility] DERMATOLOG[facility]  for your care. Our goal is always to provide you with excellent care. Hearing back from our patients is one way we can continue to improve our services. Please take a few minutes to complete the written survey that you may receive in the mail after your visit with us. Thank you!             Your Updated Medication List - Protect others around you: Learn how to safely use, store and throw away your medicines at www.disposemymeds.org.          This list is accurate as of 8/2/18  7:57 AM.  Always use your most recent med list.                   Brand Name Dispense Instructions for use Diagnosis    fluocinolone acetonide 0.01 % oil    DERMA-SMOOTHE/FS BODY    118.28 mL    Apply to areas of hair loss once a week    AA (alopecia areata)       ketoconazole 2 % shampoo    NIZORAL    120 mL    Apply topically daily as needed for itching or irritation    AA (alopecia areata)

## 2018-08-02 NOTE — LETTER
8/2/2018       RE: Adrian Gomez  [address]  [place] [address]     Dear Colleague,    Thank you for referring your patient, Adrian Gomez, to the [facility] DERMATOLOGY at [facility]. Please see a copy of my visit note below.    [facility] Dermatology Note      Dermatology Problem List:  1.Alopecia areata  -S/p ILK   -0.2 cc of 5 mg/cc into vertex scalp 5/31/18   -1 ml of 10 mg/cc into occipital, vertex, and left parietal scalp  -Dermasmoothe FS oil qWeek, and BIW-TIW left parietal scalp  -2% Ketoconazole shampoo    Encounter Date: Aug 2, 2018    CC:   No chief complaint on file.        History of Present Illness:  Ms. Adrian Gomez is a 33 year old female who presents as a follow-up for AA. The patient was last seen 5/31/18 when she had ILK to 3 small patches of alopecia and started on Dermasmooth 0.01% oil qWeek BID PRN.   The patient doesn't feel like her AA has improved. No symptoms to the scalp. Her labs were pertinent for a mildly low-normal vitamin D, low normal ferritin, and is taking vitamin D supplementation OTC. She is not taking iron supplementation.   She ius using he Dermasmooth 1x/week. She uses ketoconazole shampoo now about 2x/week (previously she was using daily).        Past Medical History:   Patient Active Problem List   Diagnosis     CARDIOVASCULAR SCREENING; LDL GOAL LESS THAN 160     Personal history of tuberculosis     Acute cystitis without hematuria     Dermatitis, seborrheic     Past Medical History:   Diagnosis Date     Personal history of tuberculosis 2004    Finished on medications for 1 year in 6/2004     Past Surgical History:   Procedure Laterality Date     None            Social History:  The patient works as a .      Family History:  No FH of alopecia    Medications:  Current Outpatient Prescriptions   Medication Sig Dispense Refill     fluocinolone acetonide (DERMA-SMOOTHE/FS BODY) 0.01 % oil Apply to  areas of hair loss once a week 118.28 mL 1     ketoconazole (NIZORAL) 2 % shampoo Apply topically daily as needed for itching or irritation 120 mL 3        No Known Allergies      Review of Systems:  -As per HPI  -Constitutional: The patient denies fatigue, fevers, chills, unintended weight loss, and night sweats.  -HEENT: Patient denies nonhealing oral sores.  -Skin: As above in HPI. No additional skin concerns.    Physical exam:  Vitals: There were no vitals taken for this visit.  GEN: This is a well developed, well-nourished female in no acute distress, in a pleasant mood.    SKIN: Focused examination of the face and scalp was performed.  -There is mild macular erythema of the scalp with mild flaky white scale.  -The patient has 3-4 patches of alopecia on the occipital scalp, left parietal scalp, and crown/vertex. The patient has hair regrowth in all of these patches, with improvement of occipital and crown, but stable over left parietal scalp.  -No other lesions of concern on areas examined.     Impression/Plan:  1. Alopecia areata    Kenalog intralesional injection procedure note: After verbal consent and discussion of risks including but not limited to atrophy, pain, and bruising, time out was performed, the patient underwent positioning and the area was prepped with isopropyl alcohol, 1 total cc of Kenalog 10 mg/cc was injected into 20 site(s) on the crown, left parietal scalp, and occipital scalp.  The patient tolerated the procedure well and left the Dermatology clinic in good condition.      Take OTC iron supplement (ferritin should be over 45)    Continue vitamin D supplement (she was low-normal on labs last time)    Can recheck vitamin D and ferritin in a couple of months    Use dermasmoothe oil all over scalp 1x/week    For the area on the left scalp use dermasmoothe 2-3x/week      CC Dr. Brown on close of this encounter.  Follow-up in 2 months, earlier for new or changing lesions.         Stephanie staffed the patient.    Staff Involved:  Resident(Kirk Souza)/Staff(as above)      Again, thank you for allowing me to participate in the care of your patient.      Sincerely,    Kirk Souza MD

## 2018-08-27 ENCOUNTER — TELEPHONE (OUTPATIENT)
Dept: GASTROENTEROLOGY | Facility: CLINIC | Age: 33
End: 2018-08-27

## 2018-08-27 NOTE — TELEPHONE ENCOUNTER
PREVISIT INFORMATION                                                    Adrian Gomez scheduled for future visit at [facility] specialty clinics.    Patient is scheduled to see Dr Hernandez on 8/28/18  Reason for visit: Acid reflux and abdominal pain  Referring provider N/A  Has patient seen previous specialist? unknown  Medical Records:  unknown    REVIEW                                                      New patient packet mailed to patient: No  Medication reconciliation complete: No      Current Outpatient Prescriptions   Medication Sig Dispense Refill     fluocinolone acetonide (DERMA-SMOOTHE/FS BODY) 0.01 % oil Apply to areas of hair loss once a week 118.28 mL 1     ketoconazole (NIZORAL) 2 % shampoo Apply topically daily as needed for itching or irritation 120 mL 3       Allergies: Review of patient's allergies indicates no known allergies.        PLAN/FOLLOW-UP NEEDED                                                      Previsit review complete.  Patient will see provider at future scheduled appointment.     VM left for patient to call clinic back to discuss GI history     Patient Reminders Given:  Please, make sure you bring an updated list of your medications.   If you are having a procedure, please, present 15 minutes early.  If you need to cancel or reschedule,please call [phone].    Ammon GARZA

## 2018-09-18 ENCOUNTER — OFFICE VISIT (OUTPATIENT)
Dept: DERMATOLOGY | Facility: CLINIC | Age: 33
End: 2018-09-18
Payer: COMMERCIAL

## 2018-09-18 DIAGNOSIS — L63.9 AA (ALOPECIA AREATA): Primary | ICD-10-CM

## 2018-09-18 DIAGNOSIS — L21.9 DERMATITIS, SEBORRHEIC: ICD-10-CM

## 2018-09-18 ASSESSMENT — PAIN SCALES - GENERAL
PAINLEVEL: MILD PAIN (2)
PAINLEVEL: NO PAIN (0)

## 2018-09-18 NOTE — NURSING NOTE
The following medication was given:     MEDICATION:  Kenalog 10 mg  ROUTE: ID  SITE: Scalp   DOSE: 50mg per 5mL  LOT #: [id number]  : [employer]  EXPIRATION DATE: 04/20  NDC#: 8772-6288-99   Was there drug waste? Yes  Amount of drug waste (mL): 3.  Reason for waste:  Single use vial  Multi-dose vial: Yes, used as single use    Glory Sanchez CMA  September 18, 2018

## 2018-09-18 NOTE — LETTER
"9/18/2018       RE: Adrian Gomez  [address]  [place] [address]     Dear Colleague,    Thank you for referring your patient, Adrian Gomez, to the [facility] DERMATOLOGY at [facility]. Please see a copy of my visit note below.    [facility] Dermatology Note      Dermatology Problem List:  1.Alopecia areata  -S/p ILK                         -0.2 cc of 5 mg/cc into vertex scalp 5/31/18                         -1 ml of 10 mg/cc into occipital, vertex, and left parietal scalp              -2ml kenalog 10mg/ml into occipital, vertex and bilateral parietal scalp 9/18/18  -Dermasmoothe FS oil qWeek, and BIW-TIW left parietal scalp  -2% Ketoconazole shampoo    CC:   Chief Complaint   Patient presents with     Hair Loss     Adrian is here today for a hair loss follow up- Adrian states \"it's getting worse\".          Encounter Date: Sep 18, 2018    History of Present Illness:  Ms. Adiran Gomez is a 33 year old female who returns to the dermatology clinic for biopsy proven alopecia areata (Dr. Jessenia Ladd did the biopsy). She first noticed this in the summer of 2017 - maybe May or June. She has had this a little over 1 year. She was last seen by Dr. Souza on 8/2/18 for ILK. She feels like he is getting a lot of very small new spots of hair loss, and it keeps spreading. The spot on the vertex scalp has not worsened. She has had regrowth in some of the areas in the past. She has stopped the ketoconazole shampoo because it is hard on her hair and she feels like she is noticing more hair loss after using it. She has continued to use the derma-smooth oil nightly. She has not noticed thinning in her eyebrows or eyelashes. She has continued to take Vit D supplements, but has not continued the iron supplements.  She is otherwise feeling well today and has no additional concerns.     Past Medical History:   Patient Active Problem List   Diagnosis     " CARDIOVASCULAR SCREENING; LDL GOAL LESS THAN 160     Personal history of tuberculosis     Acute cystitis without hematuria     Dermatitis, seborrheic     Past Medical History:   Diagnosis Date     Personal history of tuberculosis 2004    Finished on medications for 1 year in 6/2004     Past Surgical History:   Procedure Laterality Date     None         Social History:  The patient works as a .       Family History:  No FH of alopecia    Medications:  Current Outpatient Prescriptions   Medication Sig Dispense Refill     fluocinolone acetonide (DERMA-SMOOTHE/FS BODY) 0.01 % oil Apply to areas of hair loss once a week 118.28 mL 1     ketoconazole (NIZORAL) 2 % shampoo Apply topically daily as needed for itching or irritation 120 mL 3     No Known Allergies      Review of Systems:  -Constitutional: The patient denies fatigue, fevers, chills, unintended weight loss, and night sweats.  -Heme/Lymph: no concerning bumps, no bleeding or bruising problems   -Skin: As above in HPI. No additional skin concerns.    Physical exam:  Vitals: There were no vitals taken for this visit.  GEN: This is a well developed, well-nourished female in no acute distress, in a pleasant mood.    SKIN: Focused examination of the scalp, face, neck, and digits was performed.  -There is mild macular erythema of the scalp with mild flaky white scale.  -The patient has 3-4 patches of alopecia on the occipital scalp, left and right parietal scalp, and crown/vertex. The patient has hair regrowth in all of these patches, with improvement of occipital and crown, but stable over left parietal scalp and worsening on the right parietal scalp.  -No nail pitting  -Eyebrows and eyelashes appear wnl, no areas of hair loss noted  -Positive hair pull test  -No other lesions of concern on areas examined.     Impression/Plan:  1. Alopecia areata with seborrheic dermatitis    Kenalog intralesional injection procedure note: After verbal consent and discussion  of risks including but not limited to atrophy, pain, and bruising, time out was performed, the patient underwent positioning and the area was prepped with isopropyl alcohol, 2 total cc of Kenalog 10 mg/cc was injected into 30 site(s) on the vertex, occipital and bilateral parietal scalp.  The patient tolerated the procedure well and left the Dermatology clinic in good condition.      Continue with Derma-smooth oil all over scalp - 1-3x weekly    Encouraged patient to continue ketoconazole 2% shampoo, and to follow this with her [facility] hair care products and conditioner to avoid dryness of the hair    Continue Vit D and restart iron supplements (ferritin should be over 45) - will plan to recheck this in 6 weeks     CC Dr. Brown on close of this encounter.  Follow-up in 6 weeks, earlier for new or changing lesions.       Staff Involved:  Staff Only  All risks, benefits and alternatives were discussed with patient.  Patient is in agreement and understands the assessment and plan.  All questions were answered.    Raina Wolf PA-C  [facility]: Phone: [phone], Fax: [phone]

## 2018-09-18 NOTE — MR AVS SNAPSHOT
After Visit Summary   9/18/2018    Adrian Gomez    MRN: [id number]           Patient Information     Date Of Birth          1985        Visit Information        Provider Department      9/18/2018 12:30 PM Raina Wolf PA-C [facility] D[facility]at[facility]        Today's Diagnoses     AA (alopecia areata)    -  1    Dermatitis, seborrheic           Follow-ups after your visit        Follow-up notes from your care team     Return in about 6 weeks (around 10/30/2018).      Your next 10 appointments already scheduled     Oct 30, 2018  1:30 PM CDT   (Arrive by 1:15 PM)   Return Visit with MIKEY Vanegas [facility] Dermatology ([facility])    [address] [address] [phone]              Who to contact     Please call your clinic at [phone] to:    Ask questions about your health    Make or cancel appointments    Discuss your medicines    Learn about your test results    Speak to your doctor            Additional Information About Your Visit        My[employer] Intelligence Information     [email or web address] gives you secure access to your electronic health record. If you see a primary care provider, you can also send messages to your care team and make appointments. If you have questions, please call your primary care clinic.  If you do not have a primary care provider, please call [phone] and they will assist you.      [email or web address] is an electronic gateway that provides easy, online access to your medical records. With [email or web address], you can request a clinic appointment, read your test results, renew a prescription or communicate with your care team.     To access your existing account, please contact your [facility] or call [phone] for assistance.        Care EveryWhere ID     This is your Care EveryWhere ID. This could be used by other organizations to access your [place] medical records  [id number]         Blood Pressure from  Last 3 Encounters:   05/31/18 107/71   05/09/18 112/70   04/30/18 110/70    Weight from Last 3 Encounters:   08/30/17 66.8 kg (147 lb 3.2 oz)   05/10/17 66 kg (145 lb 6.4 oz)   04/05/17 65.9 kg (145 lb 3.2 oz)              Today, you had the following     No orders found for display       Primary Care Provider Fax #    Physician No Ref-Primary [phone]       No address on file        Equal Access to Services     DAISY CAICEDO : Hadii aad ku hadasho Soomaali, waaxda luqadaha, qaybta kaalmada adekevynyajesus, malia donahue . So [facility] [phone].    ATENCIÓN: Si habla español, tiene a wagner disposición servicios gratuitos de asistencia lingüística. Llame al [phone].    We comply with applicable federal civil rights laws and [place] laws. We do not discriminate on the basis of race, color, national origin, age, disability, sex, sexual orientation, or gender identity.            Thank you!     Thank you for choosing [facility] DERMATOLOGY  for your care. Our goal is always to provide you with excellent care. Hearing back from our patients is one way we can continue to improve our services. Please take a few minutes to complete the written survey that you may receive in the mail after your visit with us. Thank you!             Your Updated Medication List - Protect others around you: Learn how to safely use, store and throw away your medicines at www.disposemymeds.org.          This list is accurate as of 9/18/18  1:09 PM.  Always use your most recent med list.                   Brand Name Dispense Instructions for use Diagnosis    fluocinolone acetonide 0.01 % oil    DERMA-SMOOTHE/FS BODY    118.28 mL    Apply to areas of hair loss once a week    AA (alopecia areata)       ketoconazole 2 % shampoo    NIZORAL    120 mL    Apply topically daily as needed for itching or irritation    AA (alopecia areata)

## 2018-09-18 NOTE — PROGRESS NOTES
"[facility] Dermatology Note      Dermatology Problem List:  1.Alopecia areata  -S/p ILK                         -0.2 cc of 5 mg/cc into vertex scalp 5/31/18                         -1 ml of 10 mg/cc into occipital, vertex, and left parietal scalp              -2ml kenalog 10mg/ml into occipital, vertex and bilateral parietal scalp 9/18/18  -Dermasmoothe FS oil qWeek, and BIW-TIW left parietal scalp  -2% Ketoconazole shampoo    CC:   Chief Complaint   Patient presents with     Hair Loss     Adrian is here today for a hair loss follow up- Adrian states \"it's getting worse\".          Encounter Date: Sep 18, 2018    History of Present Illness:  Ms. Adrian Gomez is a 33 year old female who returns to the dermatology clinic for biopsy proven alopecia areata (Dr. Jessenia Ladd did the biopsy). She first noticed this in the summer of 2017 - maybe May or June. She has had this a little over 1 year. She was last seen by Dr. Souza on 8/2/18 for ILK. She feels like he is getting a lot of very small new spots of hair loss, and it keeps spreading. The spot on the vertex scalp has not worsened. She has had regrowth in some of the areas in the past. She has stopped the ketoconazole shampoo because it is hard on her hair and she feels like she is noticing more hair loss after using it. She has continued to use the derma-smooth oil nightly. She has not noticed thinning in her eyebrows or eyelashes. She has continued to take Vit D supplements, but has not continued the iron supplements.  She is otherwise feeling well today and has no additional concerns.     Past Medical History:   Patient Active Problem List   Diagnosis     CARDIOVASCULAR SCREENING; LDL GOAL LESS THAN 160     Personal history of tuberculosis     Acute cystitis without hematuria     Dermatitis, seborrheic     Past Medical History:   Diagnosis Date     Personal history of tuberculosis 2004    Finished on medications for 1 year in 6/2004     Past " Surgical History:   Procedure Laterality Date     None         Social History:  The patient works as a .       Family History:  No FH of alopecia    Medications:  Current Outpatient Prescriptions   Medication Sig Dispense Refill     fluocinolone acetonide (DERMA-SMOOTHE/FS BODY) 0.01 % oil Apply to areas of hair loss once a week 118.28 mL 1     ketoconazole (NIZORAL) 2 % shampoo Apply topically daily as needed for itching or irritation 120 mL 3     No Known Allergies      Review of Systems:  -Constitutional: The patient denies fatigue, fevers, chills, unintended weight loss, and night sweats.  -Heme/Lymph: no concerning bumps, no bleeding or bruising problems   -Skin: As above in HPI. No additional skin concerns.    Physical exam:  Vitals: There were no vitals taken for this visit.  GEN: This is a well developed, well-nourished female in no acute distress, in a pleasant mood.    SKIN: Focused examination of the scalp, face, neck, and digits was performed.  -There is mild macular erythema of the scalp with mild flaky white scale.  -The patient has 3-4 patches of alopecia on the occipital scalp, left and right parietal scalp, and crown/vertex. The patient has hair regrowth in all of these patches, with improvement of occipital and crown, but stable over left parietal scalp and worsening on the right parietal scalp.  -No nail pitting  -Eyebrows and eyelashes appear wnl, no areas of hair loss noted  -Positive hair pull test  -No other lesions of concern on areas examined.     Impression/Plan:  1. Alopecia areata with seborrheic dermatitis    Kenalog intralesional injection procedure note: After verbal consent and discussion of risks including but not limited to atrophy, pain, and bruising, time out was performed, the patient underwent positioning and the area was prepped with isopropyl alcohol, 2 total cc of Kenalog 10 mg/cc was injected into 30 site(s) on the vertex, occipital and bilateral parietal scalp.  The  patient tolerated the procedure well and left the Dermatology clinic in good condition.      Continue with Derma-smooth oil all over scalp - 1-3x weekly    Encouraged patient to continue ketoconazole 2% shampoo, and to follow this with her [facility] hair care products and conditioner to avoid dryness of the hair    Continue Vit D and restart iron supplements (ferritin should be over 45) - will plan to recheck this in 6 weeks     CC Dr. Brown on close of this encounter.  Follow-up in 6 weeks, earlier for new or changing lesions.       Staff Involved:  Staff Only  All risks, benefits and alternatives were discussed with patient.  Patient is in agreement and understands the assessment and plan.  All questions were answered.    Raina Wolf PA-C  [facility]: Phone: [phone], Fax: [phone]

## 2018-09-18 NOTE — NURSING NOTE
"Dermatology Rooming Note    Adrian Gomez's goals for this visit include:   Chief Complaint   Patient presents with     Hair Loss     Adrian is here today for a hair loss follow up- Adrian states \"it's getting worse\".      Glory Sanchez MA    "

## 2018-10-30 ENCOUNTER — OFFICE VISIT (OUTPATIENT)
Dept: DERMATOLOGY | Facility: CLINIC | Age: 33
End: 2018-10-30
Payer: COMMERCIAL

## 2018-10-30 DIAGNOSIS — E55.9 HYPOVITAMINOSIS D: ICD-10-CM

## 2018-10-30 DIAGNOSIS — L63.9 AA (ALOPECIA AREATA): ICD-10-CM

## 2018-10-30 DIAGNOSIS — L21.9 DERMATITIS, SEBORRHEIC: Primary | ICD-10-CM

## 2018-10-30 RX ORDER — FLUOCINOLONE ACETONIDE 0.11 MG/ML
OIL TOPICAL
Qty: 118.28 ML | Refills: 1 | Status: SHIPPED | OUTPATIENT
Start: 2018-10-30 | End: 2019-03-19

## 2018-10-30 RX ORDER — KETOCONAZOLE 20 MG/ML
SHAMPOO TOPICAL DAILY PRN
Qty: 120 ML | Refills: 3 | Status: SHIPPED | OUTPATIENT
Start: 2018-10-30 | End: 2019-03-19

## 2018-10-30 ASSESSMENT — PAIN SCALES - GENERAL
PAINLEVEL: MILD PAIN (2)
PAINLEVEL: NO PAIN (0)

## 2018-10-30 NOTE — NURSING NOTE
Dermatology Rooming Note    Adrian Gomez's goals for this visit include:   Chief Complaint   Patient presents with     Hair Loss     Adrian is here today for a hair loss follow up- notes some improvment.      GREG Lewis

## 2018-10-30 NOTE — LETTER
10/30/2018       RE: Adrian Gomez  [address]  [facility] [address]     Dear Colleague,    Thank you for referring your patient, Adrian Gomez, to the [facility] DERMATOLOGY at [facility]. Please see a copy of my visit note below.    [facility] Dermatology Note      Dermatology Problem List:  1.Alopecia areata -S/p ILK  -0.2 cc of 5 mg/cc into vertex scalp 5/31/18  -1 ml of 10 mg/cc into occipital, vertex, and left parietal scalp  -2ml kenalog 10mg/ml into occipital, vertex and bilateral parietal scalp 9/18/18, 1.6ml kenalog 10mg/ml 10/30/18  -Dermasmoothe FS oil qWeek, and BIW-TIW left parietal scalp  -2% Ketoconazole shampoo    Encounter Date: Oct 30, 2018    CC:  Chief Complaint   Patient presents with     Hair Loss     Adrian is here today for a hair loss follow up- notes some improvment.          History of Present Illness:  Ms. Adrian Gomez is a 33 year old female who presents as a follow-up for alopecia areata with seb derm. The patient was last seen 9/18/18 when 2ml of kenalog 10mg/ml was adminnistered in 30 sites Today the patient reports that she has seen some improvement. The patient denies painful, itching, tingling or bleeding lesions unless otherwise noted. She does need refills of her dermasmooth and ketoconazole shampoo.      Past Medical History:   Patient Active Problem List   Diagnosis     CARDIOVASCULAR SCREENING; LDL GOAL LESS THAN 160     Personal history of tuberculosis     Acute cystitis without hematuria     Dermatitis, seborrheic     Past Medical History:   Diagnosis Date     Personal history of tuberculosis 2004    Finished on medications for 1 year in 6/2004     Past Surgical History:   Procedure Laterality Date     None         Social History:   reports that she has never smoked. She has never used smokeless tobacco. She reports that she drinks alcohol. She reports that she does not use illicit drugs.    Family  History:  Family History   Problem Relation Age of Onset     Family History Negative Mother      Breast Cancer Mother      Family History Negative Father      HEART DISEASE Maternal Grandmother      HEART DISEASE Maternal Grandfather      HEART DISEASE Paternal Grandmother      HEART DISEASE Paternal Grandfather      Diabetes No family hx of      Hypertension No family hx of        Medications:  Current Outpatient Prescriptions   Medication Sig Dispense Refill     fluocinolone acetonide (DERMA-SMOOTHE/FS BODY) 0.01 % oil Apply to areas of hair loss once a week 118.28 mL 1     ketoconazole (NIZORAL) 2 % shampoo Apply topically daily as needed for itching or irritation 120 mL 3       No Known Allergies    Review of Systems:  -Constitutional: The patient denies fatigue, fevers, chills, unintended weight loss, and night sweats.  -Skin: As above in HPI. No additional skin concerns.  -Heme/Lymph: no concerning bumps, no bleeding or bruising problems     Physical exam:  Vitals: There were no vitals taken for this visit.  GEN: This is a well developed, well-nourished female in no acute distress, in a pleasant mood.    SKIN: Focused examination of the scalp, face, neck and digits was performed.  -5mm of regrowth though out the affected areas  -Negative hair pull test  -eyebrows, eyelashes and nails appear nml  -No other lesions of concern on areas examined.       Impression/Plan:  1. Alopecia areata with seborrheic dermatitis     Kenalog intralesional injection procedure note: After verbal consent and discussion of risks including but not limited to atrophy, pain, and bruising, time out was performed, the patient underwent positioning and the area was prepped with isopropyl alcohol. 1.6 total cc of Kenalog 10 mg/cc was injected into 30 sites on the vertex, occipital and bilateral parietal scalp.  The patient tolerated the procedure well and left the Dermatology clinic in good condition.    Continue with derma-smooth oil all  over scalp- 1-3x weekly, refilled today    Continue Ketoconazole 2% shampoo, and to follow this with her nml hair care products and conditioner to avoid dryness of the hair    Continue Vit D and restart iron supplement (ferritin should be over 45)    CC Dr. Brown on close of this encounter.  Follow-up in 1 month, earlier for new or changing lesions.       Staff Involved:    Scribe Disclosure  I, Tramaine Bhatia, am serving as a scribe to document services personally performed by Raina Wolf PA-C, based on data collection and the provider's statements to me.     Provider Disclosure:   The documentation recorded by the scribe accurately reflects the services I personally performed and the decisions made by me.    All risks, benefits and alternatives were discussed with patient.  Patient is in agreement and understands the assessment and plan.  All questions were answered.    Raina Wolf PA-C  [facility]: Phone: [phone], Fax: [phone]                    Again, thank you for allowing me to participate in the care of your patient.      Sincerely,    Raina Wolf PA-C

## 2018-10-30 NOTE — PROGRESS NOTES
[facility] Dermatology Note      Dermatology Problem List:  1.Alopecia areata -S/p ILK  -0.2 cc of 5 mg/cc into vertex scalp 5/31/18  -1 ml of 10 mg/cc into occipital, vertex, and left parietal scalp  -2ml kenalog 10mg/ml into occipital, vertex and bilateral parietal scalp 9/18/18, 1.6ml kenalog 10mg/ml 10/30/18  -Dermasmoothe FS oil qWeek, and BIW-TIW left parietal scalp  -2% Ketoconazole shampoo    Encounter Date: Oct 30, 2018    CC:  Chief Complaint   Patient presents with     Hair Loss     Adrian is here today for a hair loss follow up- notes some improvment.          History of Present Illness:  Ms. Adrian Gomez is a 33 year old female who presents as a follow-up for alopecia areata with seb derm. The patient was last seen 9/18/18 when 2ml of kenalog 10mg/ml was adminnistered in 30 sites Today the patient reports that she has seen some improvement. The patient denies painful, itching, tingling or bleeding lesions unless otherwise noted. She does need refills of her dermasmooth and ketoconazole shampoo.      Past Medical History:   Patient Active Problem List   Diagnosis     CARDIOVASCULAR SCREENING; LDL GOAL LESS THAN 160     Personal history of tuberculosis     Acute cystitis without hematuria     Dermatitis, seborrheic     Past Medical History:   Diagnosis Date     Personal history of tuberculosis 2004    Finished on medications for 1 year in 6/2004     Past Surgical History:   Procedure Laterality Date     None         Social History:   reports that she has never smoked. She has never used smokeless tobacco. She reports that she drinks alcohol. She reports that she does not use illicit drugs.    Family History:  Family History   Problem Relation Age of Onset     Family History Negative Mother      Breast Cancer Mother      Family History Negative Father      HEART DISEASE Maternal Grandmother      HEART DISEASE Maternal Grandfather      HEART DISEASE Paternal Grandmother      HEART DISEASE  Paternal Grandfather      Diabetes No family hx of      Hypertension No family hx of        Medications:  Current Outpatient Prescriptions   Medication Sig Dispense Refill     fluocinolone acetonide (DERMA-SMOOTHE/FS BODY) 0.01 % oil Apply to areas of hair loss once a week 118.28 mL 1     ketoconazole (NIZORAL) 2 % shampoo Apply topically daily as needed for itching or irritation 120 mL 3       No Known Allergies    Review of Systems:  -Constitutional: The patient denies fatigue, fevers, chills, unintended weight loss, and night sweats.  -Skin: As above in HPI. No additional skin concerns.  -Heme/Lymph: no concerning bumps, no bleeding or bruising problems     Physical exam:  Vitals: There were no vitals taken for this visit.  GEN: This is a well developed, well-nourished female in no acute distress, in a pleasant mood.    SKIN: Focused examination of the scalp, face, neck and digits was performed.  -5mm of regrowth though out the affected areas  -Negative hair pull test  -eyebrows, eyelashes and nails appear nml  -No other lesions of concern on areas examined.       Impression/Plan:  1. Alopecia areata with seborrheic dermatitis     Kenalog intralesional injection procedure note: After verbal consent and discussion of risks including but not limited to atrophy, pain, and bruising, time out was performed, the patient underwent positioning and the area was prepped with isopropyl alcohol. 1.6 total cc of Kenalog 10 mg/cc was injected into 30 sites on the vertex, occipital and bilateral parietal scalp.  The patient tolerated the procedure well and left the Dermatology clinic in good condition.    Continue with derma-smooth oil all over scalp- 1-3x weekly, refilled today    Continue Ketoconazole 2% shampoo, and to follow this with her nml hair care products and conditioner to avoid dryness of the hair    Continue Vit D and restart iron supplement (ferritin should be over 45)    CC Dr. Brown on close of this  encounter.  Follow-up in 1 month, earlier for new or changing lesions.       Staff Involved:    Scribe Disclosure  I, Tramaine Bhatia, am serving as a scribe to document services personally performed by Raina Wolf PA-C, based on data collection and the provider's statements to me.     Provider Disclosure:   The documentation recorded by the scribe accurately reflects the services I personally performed and the decisions made by me.    All risks, benefits and alternatives were discussed with patient.  Patient is in agreement and understands the assessment and plan.  All questions were answered.    Raina Wolf PA-C  [facility]: Phone: [phone], Fax: [phone]

## 2018-10-30 NOTE — MR AVS SNAPSHOT
After Visit Summary   10/30/2018    Adrian Gomez    MRN: [id number]           Patient Information     Date Of Birth          1985        Visit Information        Provider Department      10/30/2018 1:30 PM Raina Wolf PA-C [facility] Dermatology        Today's Diagnoses     Dermatitis, seborrheic    -  1    AA (alopecia areata)        Hypovitaminosis D           Follow-ups after your visit        Follow-up notes from your care team     Return in about 4 weeks (around 11/27/2018).      Your next 10 appointments already scheduled     Nov 27, 2018 12:45 PM CST   (Arrive by 12:30 PM)   Return Visit with MIKEY Vanegas [facility] Dermatology ([facility])    [address] [address] [phone]              Who to contact     Please call your clinic at [phone] to:    Ask questions about your health    Make or cancel appointments    Discuss your medicines    Learn about your test results    Speak to your doctor            Additional Information About Your Visit        [employer] Information     [employer] gives you secure access to your electronic health record. If you see a primary care provider, you can also send messages to your care team and make appointments. If you have questions, please call your primary care clinic.  If you do not have a primary care provider, please call [phone] and they will assist you.      [employer] is an electronic gateway that provides easy, online access to your medical records. With [employer], you can request a clinic appointment, read your test results, renew a prescription or communicate with your care team.     To access your existing account, please contact your [facility] or call [phone] for assistance.        Care EveryWhere ID     This is your Care EveryWhere ID. This could be used by other organizations to access your [place] medical records  [id number]          Blood Pressure from Last 3 Encounters:   05/31/18 107/71   05/09/18 112/70   04/30/18 110/70    Weight from Last 3 Encounters:   08/30/17 66.8 kg (147 lb 3.2 oz)   05/10/17 66 kg (145 lb 6.4 oz)   04/05/17 65.9 kg (145 lb 3.2 oz)              We Performed the Following     INJECTION INTO SKIN LESIONS >7          Today's Medication Changes          These changes are accurate as of 10/30/18  7:04 PM.  If you have any questions, ask your nurse or doctor.               Start taking these medicines.        Dose/Directions    triamcinolone acetonide 10 MG/ML injection   Commonly known as:  KENALOG   Used for:  AA (alopecia areata)   Started by:  Raina Wolf PA-C        Dose:  10 mg   Inject 1 mL (10 mg) into the skin once for 1 dose   Quantity:  2 mL   Refills:  0            Where to get your medicines      These medications were sent to [address] [place] [address] [address] [place] [address]    Hours:  TRANSPLANT PHONE NUMBER [phone] Phone:  [phone]     fluocinolone acetonide 0.01 % oil    ketoconazole 2 % shampoo         Some of these will need a paper prescription and others can be bought over the counter.  Ask your nurse if you have questions.     You don't need a prescription for these medications     triamcinolone acetonide 10 MG/ML injection                Primary Care Provider Fax #    Physician No Ref-Primary [phone]       No address on file        Equal Access to Services     DAISY CAICEDO AH: Hadii jaylene ku hadasho Soascencionali, waaxda luqadaha, qaybta kaalmada adeegyada, malia paul. So [facility] [phone].    ATENCIÓN: Si habla silvia, tiene a wagner disposición servicios gratuitos de asistencia lingüística. Llame al [phone].    We comply with applicable federal civil rights laws and [place] laws. We do not discriminate on the basis of race, color, national origin, age, disability,  sex, sexual orientation, or gender identity.            Thank you!     Thank you for choosing [facility] DERMATOLOGY  for your care. Our goal is always to provide you with excellent care. Hearing back from our patients is one way we can continue to improve our services. Please take a few minutes to complete the written survey that you may receive in the mail after your visit with us. Thank you!             Your Updated Medication List - Protect others around you: Learn how to safely use, store and throw away your medicines at www.disposemymeds.org.          This list is accurate as of 10/30/18  7:04 PM.  Always use your most recent med list.                   Brand Name Dispense Instructions for use Diagnosis    fluocinolone acetonide 0.01 % oil    DERMA-SMOOTHE/FS BODY    118.28 mL    Apply to areas of hair loss once a week    AA (alopecia areata)       ketoconazole 2 % shampoo    NIZORAL    120 mL    Apply topically daily as needed for itching or irritation    AA (alopecia areata)       triamcinolone acetonide 10 MG/ML injection    KENALOG    2 mL    Inject 1 mL (10 mg) into the skin once for 1 dose    AA (alopecia areata)

## 2018-10-30 NOTE — NURSING NOTE
Drug Administration Record    Drug Name: triamcinolone acetonide(kenalog)  Dose: 5mL of triamcinolone 10mg/mL, 50mg dose  Route administered: ID  NDC #: Kenalog-10 (3289-8100-03)  Amount of waste(mL):3.4  Reason for waste: Single use vial    LOT #: [id number]  SITE: [facility]  : [employer]  EXPIRATION DATE: 04/20

## 2018-10-30 NOTE — LETTER
Date:October 31, 2018      Patient was self referred, no letter generated. Do not send.        [facility] Information

## 2018-11-27 ENCOUNTER — OFFICE VISIT (OUTPATIENT)
Dept: DERMATOLOGY | Facility: CLINIC | Age: 33
End: 2018-11-27
Payer: COMMERCIAL

## 2018-11-27 DIAGNOSIS — L63.9 AA (ALOPECIA AREATA): ICD-10-CM

## 2018-11-27 DIAGNOSIS — E55.9 HYPOVITAMINOSIS D: ICD-10-CM

## 2018-11-27 DIAGNOSIS — L21.9 DERMATITIS, SEBORRHEIC: ICD-10-CM

## 2018-11-27 DIAGNOSIS — L63.9 AA (ALOPECIA AREATA): Primary | ICD-10-CM

## 2018-11-27 LAB
BASOPHILS # BLD AUTO: 0 10E9/L (ref 0–0.2)
BASOPHILS NFR BLD AUTO: 0.2 %
DEPRECATED CALCIDIOL+CALCIFEROL SERPL-MC: 17 UG/L (ref 20–75)
DIFFERENTIAL METHOD BLD: NORMAL
EOSINOPHIL # BLD AUTO: 0.1 10E9/L (ref 0–0.7)
EOSINOPHIL NFR BLD AUTO: 1.1 %
ERYTHROCYTE [DISTWIDTH] IN BLOOD BY AUTOMATED COUNT: 12.7 % (ref 10–15)
FERRITIN SERPL-MCNC: 27 NG/ML (ref 12–150)
HCT VFR BLD AUTO: 41.1 % (ref 35–47)
HGB BLD-MCNC: 13.4 G/DL (ref 11.7–15.7)
IMM GRANULOCYTES # BLD: 0 10E9/L (ref 0–0.4)
IMM GRANULOCYTES NFR BLD: 0.5 %
LYMPHOCYTES # BLD AUTO: 1.4 10E9/L (ref 0.8–5.3)
LYMPHOCYTES NFR BLD AUTO: 22.6 %
MCH RBC QN AUTO: 28.9 PG (ref 26.5–33)
MCHC RBC AUTO-ENTMCNC: 32.6 G/DL (ref 31.5–36.5)
MCV RBC AUTO: 89 FL (ref 78–100)
MONOCYTES # BLD AUTO: 0.5 10E9/L (ref 0–1.3)
MONOCYTES NFR BLD AUTO: 8.5 %
NEUTROPHILS # BLD AUTO: 4.2 10E9/L (ref 1.6–8.3)
NEUTROPHILS NFR BLD AUTO: 67.1 %
NRBC # BLD AUTO: 0 10*3/UL
NRBC BLD AUTO-RTO: 0 /100
PLATELET # BLD AUTO: 233 10E9/L (ref 150–450)
RBC # BLD AUTO: 4.64 10E12/L (ref 3.8–5.2)
TSH SERPL DL<=0.005 MIU/L-ACNC: 1.32 MU/L (ref 0.4–4)
WBC # BLD AUTO: 6.2 10E9/L (ref 4–11)

## 2018-11-27 ASSESSMENT — PAIN SCALES - GENERAL
PAINLEVEL: NO PAIN (0)
PAINLEVEL: MILD PAIN (2)

## 2018-11-27 NOTE — MR AVS SNAPSHOT
After Visit Summary   11/27/2018    Adrian Gomez    MRN: [id number]           Patient Information     Date Of Birth          1985        Visit Information        Provider Department      11/27/2018 12:45 PM Raina Wolf PA-C [facility] Dermatology        Today's Diagnoses     AA (alopecia areata)    -  1    Hypovitaminosis D        Dermatitis, seborrheic           Follow-ups after your visit        Follow-up notes from your care team     Return in about 6 weeks (around 1/8/2019).      Your next 10 appointments already scheduled     Dec 05, 2018 11:00 AM CST   New Prenatal with NE RN   [facility] ([facility])    [address] [place] [address]   [phone]            Dec 11, 2018 10:00 AM CST   New Prenatal with Catina Vega MD   [facility] ([facility])    [address] [place] [address]   [phone]            Jan 04, 2019  4:15 PM CST   (Arrive by 4:00 PM)   Return Visit with MIKEY Burris [facility] Dermatology ([facility] [place])    [address] [place] [address] [phone]              Who to contact     Please call your clinic at [phone] to:    Ask questions about your health    Make or cancel appointments    Discuss your medicines    Learn about your test results    Speak to your doctor            Additional Information About Your Visit        MyChart Information     [facility] gives you secure access to your electronic health record. If you see a primary care provider, you can also send messages to your care team and make appointments. If you have questions, please call your primary care clinic.  If you do not have a primary care provider, please call [phone] and they will assist you.      [facility] is an electronic gateway that provides easy, online access to your medical records. With [facility], you  can request a clinic appointment, read your test results, renew a prescription or communicate with your care team.     To access your existing account, please contact your [facility] or call [phone] for assistance.        Care EveryWhere ID     This is your Care EveryWhere ID. This could be used by other organizations to access your [place] medical records  [id number]         Blood Pressure from Last 3 Encounters:   05/31/18 107/71   05/09/18 112/70   04/30/18 110/70    Weight from Last 3 Encounters:   08/30/17 66.8 kg (147 lb 3.2 oz)   05/10/17 66 kg (145 lb 6.4 oz)   04/05/17 65.9 kg (145 lb 3.2 oz)              We Performed the Following     INJECTION INTO SKIN LESIONS >7          Today's Medication Changes          These changes are accurate as of 11/27/18  6:37 PM.  If you have any questions, ask your nurse or doctor.               Start taking these medicines.        Dose/Directions    triamcinolone acetonide 10 MG/ML injection   Commonly known as:  KENALOG   Used for:  AA (alopecia areata)   Started by:  Raina Wolf PA-C        Dose:  10 mg   Inject 1 mL (10 mg) into the skin once for 1 dose   Quantity:  1.2 mL   Refills:  0            Where to get your medicines      Some of these will need a paper prescription and others can be bought over the counter.  Ask your nurse if you have questions.     You don't need a prescription for these medications     triamcinolone acetonide 10 MG/ML injection                Primary Care Provider Fax #    Physician No Ref-Primary [phone]       No address on file        Equal Access to Services     AFIA CAICEDO : Sherry Arevalo, waaxda luqadaha, qaybta kaalmada michelle, malia paul. So [facility] [phone].    ATENCIÓN: Si habla español, tiene a wagner disposición servicios gratuitos de asistencia lingüística. Llame al [phone].    We comply with applicable federal civil rights laws  and [place] laws. We do not discriminate on the basis of race, color, national origin, age, disability, sex, sexual orientation, or gender identity.            Thank you!     Thank you for choosing [facility] DERMATOLOGY  for your care. Our goal is always to provide you with excellent care. Hearing back from our patients is one way we can continue to improve our services. Please take a few minutes to complete the written survey that you may receive in the mail after your visit with us. Thank you!             Your Updated Medication List - Protect others around you: Learn how to safely use, store and throw away your medicines at www.disposemymeds.org.          This list is accurate as of 11/27/18  6:37 PM.  Always use your most recent med list.                   Brand Name Dispense Instructions for use Diagnosis    fluocinolone acetonide 0.01 % external oil    DERMA-SMOOTHE/FS BODY    118.28 mL    Apply to areas of hair loss once a week    AA (alopecia areata)       ketoconazole 2 % external shampoo    NIZORAL    120 mL    Apply topically daily as needed for itching or irritation    AA (alopecia areata)       triamcinolone acetonide 10 MG/ML injection    KENALOG    1.2 mL    Inject 1 mL (10 mg) into the skin once for 1 dose    AA (alopecia areata)

## 2018-11-27 NOTE — NURSING NOTE
Drug Administration Record    Drug Name: triamcinolone acetonide(kenalog)  Dose: 5mL of triamcinolone 10mg/mL, 50mg dose  Route administered: ID  NDC #: Kenalog-10 (3934-4968-80)  Amount of waste(mL): 3.8  Reason for waste: Multi dose vial    LOT #: [id number]  SITE: [facility]  : [employer]  EXPIRATION DATE: 05/20

## 2018-11-27 NOTE — NURSING NOTE
"Dermatology Rooming Note    Adrian Gomez's goals for this visit include:   Chief Complaint   Patient presents with     Hair Loss     Adrian is here today for a hair loss follow up- Adrian states \"it's the same\".      Glory Sanchez, RMEBONY     "

## 2018-11-27 NOTE — LETTER
"11/27/2018       RE: Adrian Gomez  [address]  [facility] [address]     Dear Colleague,    Thank you for referring your patient, Adrian Gomez, to the [facility] DERMATOLOGY at [facility]. Please see a copy of my visit note below.    [facility] Dermatology Note      Dermatology Problem List:  1.Alopecia areata -S/p ILK  -0.2 cc of 5 mg/cc into vertex scalp 5/31/18  -1 ml of 10 mg/cc into occipital, vertex, and left parietal scalp  -2ml kenalog 10mg/ml into occipital, vertex and bilateral parietal scalp 9/18/18,  -1.6ml kenalog 10mg/ml 10/30/18  - 1.2 total cc of Kenalog 10 mg/cc was injected into 25 sites on bilateral parietal scalp  11/27/18  -Dermasmoothe FS oil qWeek, and BIW-TIW left parietal scalp  -2% Ketoconazole shampoo    Patient is currently 5 weeks pregnant with 2nd child    Encounter Date: Nov 27, 2018    CC:  Chief Complaint   Patient presents with     Hair Loss     Adrian is here today for a hair loss follow up- Adrian states \"it's the same\".          History of Present Illness:  Ms. Adrian Gomez is a 33 year old female who presents as a follow-up for alopecia areata. She is currently 5 week pregnant. The patient was last seen 10/30/2018 when 1.6 total cc of Kenalog 10 mg/cc was injected into 30 sites. The patient was also recommended to continue using ketoconazole shampoo, derma-smooth oil, Vit D and Iron supplementation. She has started taking prenatal vitamins and occasionally takes vitamin D. But she does not take iron and is unsure if her prenatal has iron it in.  The patient denies painful, itching, tingling or bleeding lesions unless otherwise noted.        Past Medical History:   Patient Active Problem List   Diagnosis     CARDIOVASCULAR SCREENING; LDL GOAL LESS THAN 160     Personal history of tuberculosis     Acute cystitis without hematuria     Dermatitis, seborrheic     Past Medical History:   Diagnosis Date     Personal history " of tuberculosis 2004    Finished on medications for 1 year in 6/2004     Past Surgical History:   Procedure Laterality Date     None         Social History:   reports that she has never smoked. She has never used smokeless tobacco. She reports that she drinks alcohol. She reports that she does not use illicit drugs.    Family History:  Family History   Problem Relation Age of Onset     Family History Negative Mother      Breast Cancer Mother      Family History Negative Father      HEART DISEASE Maternal Grandmother      HEART DISEASE Maternal Grandfather      HEART DISEASE Paternal Grandmother      HEART DISEASE Paternal Grandfather      Diabetes No family hx of      Hypertension No family hx of        Medications:  Current Outpatient Prescriptions   Medication Sig Dispense Refill     fluocinolone acetonide (DERMA-SMOOTHE/FS BODY) 0.01 % oil Apply to areas of hair loss once a week 118.28 mL 1     ketoconazole (NIZORAL) 2 % shampoo Apply topically daily as needed for itching or irritation 120 mL 3       No Known Allergies    Review of Systems:  -Constitutional: The patient denies fatigue, fevers, chills, unintended weight loss, and night sweats.  -Skin: As above in HPI. No additional skin concerns.  -Heme/Lymph: no concerning bumps, no bleeding or bruising problems     Physical exam:  Vitals: There were no vitals taken for this visit.  GEN: This is a well developed, well-nourished female in no acute distress, in a pleasant mood.    SKIN: Focused examination of the scalp, face, neck and digits was performed.  -7 mm of regrowth though out the affected areas (aside from new patches - listed below)  -Negative hair pull test  -eyebrows, eyelashes and nails appear nml  -Two 8mm patches of alopecia one on the left parietal scalp and one on the right parital scalp with no regrowth currently   -No other lesions of concern on areas examined.       Impression/Plan:  1. Alopecia areata with seborrheic dermatitis     Kenalog  intralesional injection procedure note: After verbal consent and discussion of risks including but not limited to atrophy, pain, and bruising, time out was performed, the patient underwent positioning and the area was prepped with isopropyl alcohol. 1.2 total cc of Kenalog 10 mg/cc was injected into 25 sites on the vertex, occipital and bilateral parietal scalp.  The patient tolerated the procedure well and left the Dermatology clinic in good condition.    Continue with derma-smooth oil all over scalp- 1-3x weekly    Continue Ketoconazole 2% shampoo, and to follow this with her [facility] hair care products and conditioner to avoid dryness of the hair    Continue Vit D and iron supplement - advised patient to look for prenatal vitamin with iron    Discussed effects of pregnancy on alopecia areata    Will recheck labs today - CBC, Vit D, Ferritin and TSH with free T4 reflex    CC Dr. Brown on close of this encounter.  Follow-up in 6 weeks, earlier for new or changing lesions.       Staff Involved:  Staff/Scribe    Scribe Disclosure:  I, Tramaine Bhatia, am serving as a scribe to document services personally performed by Raina Wolf PA-C, based on data collection and the provider's statements to me.     Provider Disclosure:   The documentation recorded by the scribe accurately reflects the services I personally performed and the decisions made by me.    All risks, benefits and alternatives were discussed with patient.  Patient is in agreement and understands the assessment and plan.  All questions were answered.    Raina Wolf PA-C  [facility]: Phone: [phone], Fax: [phone]                    Again, thank you for allowing me to participate in the care of your patient.      Sincerely,    Raina Wolf PA-C

## 2018-11-27 NOTE — LETTER
Date:November 28, 2018      Patient was self referred, no letter generated. Do not send.        [facility] Health Information

## 2018-11-27 NOTE — PROGRESS NOTES
"[facility] Dermatology Note      Dermatology Problem List:  1.Alopecia areata -S/p ILK  -0.2 cc of 5 mg/cc into vertex scalp 5/31/18  -1 ml of 10 mg/cc into occipital, vertex, and left parietal scalp  -2ml kenalog 10mg/ml into occipital, vertex and bilateral parietal scalp 9/18/18,  -1.6ml kenalog 10mg/ml 10/30/18  - 1.2 total cc of Kenalog 10 mg/cc was injected into 25 sites on bilateral parietal scalp  11/27/18  -Dermasmoothe FS oil qWeek, and BIW-TIW left parietal scalp  -2% Ketoconazole shampoo    Patient is currently 5 weeks pregnant with 2nd child    Encounter Date: Nov 27, 2018    CC:  Chief Complaint   Patient presents with     Hair Loss     Adrian is here today for a hair loss follow up- Adrian states \"it's the same\".          History of Present Illness:  Ms. Adrian Gomez is a 33 year old female who presents as a follow-up for alopecia areata. She is currently 5 week pregnant. The patient was last seen 10/30/2018 when 1.6 total cc of Kenalog 10 mg/cc was injected into 30 sites. The patient was also recommended to continue using ketoconazole shampoo, derma-smooth oil, Vit D and Iron supplementation. She has started taking prenatal vitamins and occasionally takes vitamin D. But she does not take iron and is unsure if her prenatal has iron it in.  The patient denies painful, itching, tingling or bleeding lesions unless otherwise noted.        Past Medical History:   Patient Active Problem List   Diagnosis     CARDIOVASCULAR SCREENING; LDL GOAL LESS THAN 160     Personal history of tuberculosis     Acute cystitis without hematuria     Dermatitis, seborrheic     Past Medical History:   Diagnosis Date     Personal history of tuberculosis 2004    Finished on medications for 1 year in 6/2004     Past Surgical History:   Procedure Laterality Date     None         Social History:   reports that she has never smoked. She has never used smokeless tobacco. She reports that she drinks alcohol. She reports " that she does not use illicit drugs.    Family History:  Family History   Problem Relation Age of Onset     Family History Negative Mother      Breast Cancer Mother      Family History Negative Father      HEART DISEASE Maternal Grandmother      HEART DISEASE Maternal Grandfather      HEART DISEASE Paternal Grandmother      HEART DISEASE Paternal Grandfather      Diabetes No family hx of      Hypertension No family hx of        Medications:  Current Outpatient Prescriptions   Medication Sig Dispense Refill     fluocinolone acetonide (DERMA-SMOOTHE/FS BODY) 0.01 % oil Apply to areas of hair loss once a week 118.28 mL 1     ketoconazole (NIZORAL) 2 % shampoo Apply topically daily as needed for itching or irritation 120 mL 3       No Known Allergies    Review of Systems:  -Constitutional: The patient denies fatigue, fevers, chills, unintended weight loss, and night sweats.  -Skin: As above in HPI. No additional skin concerns.  -Heme/Lymph: no concerning bumps, no bleeding or bruising problems     Physical exam:  Vitals: There were no vitals taken for this visit.  GEN: This is a well developed, well-nourished female in no acute distress, in a pleasant mood.    SKIN: Focused examination of the scalp, face, neck and digits was performed.  -7 mm of regrowth though out the affected areas (aside from new patches - listed below)  -Negative hair pull test  -eyebrows, eyelashes and nails appear nml  -Two 8mm patches of alopecia one on the left parietal scalp and one on the right parital scalp with no regrowth currently   -No other lesions of concern on areas examined.       Impression/Plan:  1. Alopecia areata with seborrheic dermatitis     Kenalog intralesional injection procedure note: After verbal consent and discussion of risks including but not limited to atrophy, pain, and bruising, time out was performed, the patient underwent positioning and the area was prepped with isopropyl alcohol. 1.2 total cc of Kenalog 10 mg/cc  was injected into 25 sites on the vertex, occipital and bilateral parietal scalp.  The patient tolerated the procedure well and left the Dermatology clinic in good condition.    Continue with derma-smooth oil all over scalp- 1-3x weekly    Continue Ketoconazole 2% shampoo, and to follow this with her nml hair care products and conditioner to avoid dryness of the hair    Continue Vit D and iron supplement - advised patient to look for prenatal vitamin with iron    Discussed effects of pregnancy on alopecia areata    Will recheck labs today - CBC, Vit D, Ferritin and TSH with free T4 reflex    CC Dr. Brown on close of this encounter.  Follow-up in 6 weeks, earlier for new or changing lesions.       Staff Involved:  Staff/Scribe    Scribe Disclosure:  I, Tramaine Bhatia, am serving as a scribe to document services personally performed by Raina Wolf PA-C, based on data collection and the provider's statements to me.     Provider Disclosure:   The documentation recorded by the scribe accurately reflects the services I personally performed and the decisions made by me.    All risks, benefits and alternatives were discussed with patient.  Patient is in agreement and understands the assessment and plan.  All questions were answered.    Raina Wolf PA-C  [facility]: Phone: [phone], Fax: [phone]

## 2018-12-10 ENCOUNTER — PRENATAL OFFICE VISIT (OUTPATIENT)
Dept: NURSING | Facility: CLINIC | Age: 33
End: 2018-12-10
Payer: COMMERCIAL

## 2018-12-10 VITALS
OXYGEN SATURATION: 100 % | DIASTOLIC BLOOD PRESSURE: 60 MMHG | RESPIRATION RATE: 20 BRPM | WEIGHT: 153.4 LBS | TEMPERATURE: 98 F | SYSTOLIC BLOOD PRESSURE: 102 MMHG | HEIGHT: 68 IN | HEART RATE: 68 BPM | BODY MASS INDEX: 23.25 KG/M2

## 2018-12-10 DIAGNOSIS — N91.2 AMENORRHEA: Primary | ICD-10-CM

## 2018-12-10 DIAGNOSIS — Z34.90 SUPERVISION OF NORMAL PREGNANCY: ICD-10-CM

## 2018-12-10 LAB
ALBUMIN UR-MCNC: NEGATIVE MG/DL
APPEARANCE UR: CLEAR
BETA HCG QUAL IFA URINE: POSITIVE
BILIRUB UR QL STRIP: NEGATIVE
COLOR UR AUTO: YELLOW
GLUCOSE UR STRIP-MCNC: NEGATIVE MG/DL
HGB UR QL STRIP: NEGATIVE
KETONES UR STRIP-MCNC: NEGATIVE MG/DL
LEUKOCYTE ESTERASE UR QL STRIP: NEGATIVE
NITRATE UR QL: NEGATIVE
PH UR STRIP: 8 PH (ref 5–7)
SOURCE: ABNORMAL
SP GR UR STRIP: 1.01 (ref 1–1.03)
UROBILINOGEN UR STRIP-ACNC: 0.2 EU/DL (ref 0.2–1)

## 2018-12-10 PROCEDURE — 87389 HIV-1 AG W/HIV-1&-2 AB AG IA: CPT | Performed by: INTERNAL MEDICINE

## 2018-12-10 PROCEDURE — 86900 BLOOD TYPING SEROLOGIC ABO: CPT | Performed by: INTERNAL MEDICINE

## 2018-12-10 PROCEDURE — 86901 BLOOD TYPING SEROLOGIC RH(D): CPT | Performed by: INTERNAL MEDICINE

## 2018-12-10 PROCEDURE — 99207 ZZC NO CHARGE NURSE ONLY: CPT

## 2018-12-10 PROCEDURE — 87086 URINE CULTURE/COLONY COUNT: CPT | Performed by: INTERNAL MEDICINE

## 2018-12-10 PROCEDURE — 86762 RUBELLA ANTIBODY: CPT | Performed by: INTERNAL MEDICINE

## 2018-12-10 PROCEDURE — 87340 HEPATITIS B SURFACE AG IA: CPT | Performed by: INTERNAL MEDICINE

## 2018-12-10 PROCEDURE — 86780 TREPONEMA PALLIDUM: CPT | Performed by: INTERNAL MEDICINE

## 2018-12-10 PROCEDURE — 36415 COLL VENOUS BLD VENIPUNCTURE: CPT | Performed by: INTERNAL MEDICINE

## 2018-12-10 PROCEDURE — 84703 CHORIONIC GONADOTROPIN ASSAY: CPT | Performed by: FAMILY MEDICINE

## 2018-12-10 PROCEDURE — 86850 RBC ANTIBODY SCREEN: CPT | Performed by: INTERNAL MEDICINE

## 2018-12-10 PROCEDURE — 81003 URINALYSIS AUTO W/O SCOPE: CPT | Performed by: INTERNAL MEDICINE

## 2018-12-10 RX ORDER — CHOLECALCIFEROL (VITAMIN D3) 50 MCG
1 TABLET ORAL DAILY
COMMUNITY
End: 2023-01-02

## 2018-12-10 ASSESSMENT — MIFFLIN-ST. JEOR: SCORE: 1452.97

## 2018-12-10 NOTE — PROGRESS NOTES
Prenatal OB Questionnaire  Past Medical History  Diabetes   No  Hypertension   No  Heart Disease, mitral valve prolapse, or rheumatic fever?   No  An autoimmune disorder such as Lupus or Rheumatoid Arthritis?   No  Kidney Disease or Urinary Tract Infection?   No  Epilepsy, seizures or spells?   No  Migraine headaches?   No  A stroke or loss of function or sensation?   No  Any other neurological problems?   No  Have you ever been treated for depression?  No  Are you having problems with crying spells or loss of self-esteem?   No  Have you ever required psychiatric care?   No  Have you ever hepatitis, liver disease or jaundice?   No  Have you ever been treated for blood clots in your veins, deep venous thrombosis, inflammation in the veins, thrombosis, phlebitis, pulmonary embolism or varicosities?   No  Have you had excessive bleeding after surgery or dental work?   No  Do you bleed more than other women after a cut or scratch?   No  Do you have a history of anemia?   No  Have you ever been treated for thyroid problems or taken thyroid medication?  No  Do you have any other endocrine problems?  No  Have you ever been in a major accident or suffered serious trauma?   No  Within the last year, has anyone hit slapped, kicked or otherwise hurt you?  No  In the last year, has anyone forced you to have sex when you didn't want to?  No  Have you ever had a blood transfusion?   No  Would you refuse a blood transfusion if a doctor judged it to be medically necessary?   No  If you answered yes, would you rather die than have a blood transfusion?   N/A  If you answered yes, is this for [facility] reasons?   N/A  Does anyone in your home smoke?   No  Do you use tobacco products?  No  Do you drink beer, wine, hard liquor?  Yes, rarely, about 2 drinks per month, none since pregnant  Do you use any of the following: marijuana, speed, cocaine, heroine, hallucinogens, or other drugs?  No  Is your blood type Rh negative?   No  Have  you ever had abnormal antibodies in your blood?   No  Have you ever had asthma?   No  Have you ever had tuberculosis?   Yes, states was treated as a child  Do you have any allergies to drugs or over-the-counter medications?   No    Allergies as of 12/10/2018:    Allergies as of 12/10/2018     (No Known Allergies)       Do you have any breast problems?   No  Have you ever ?   Yes, about 1.5 years with first child  Have you had any gynecological surgical procedures such as cervical conization, a LEEP procedure, laser treatment, cryosurgery of the cervix, or a dilation and curettage, etc?  No  Have you had any other surgical procedures?  No  Have you been hospitalized for a nonsurgical reason excluding normal delivery?   No  Have you ever had any anesthetic complications?   No  Have you ever had an abnormal pap smear?   No  Do you have a history of abnormalities of the uterus?   No  Did it take you more than one year to become pregnant?   No  Have you ever been evaluated or treated for infertility?   No  Is there a history of medical problems in your family, which you feel might adversely affect your health or pregnancy?   No  Do you have any other problems we have not asked you about which you feel may be important to this pregnancy?  No    Symptoms since Last Menstrual Period  Do you have any of the following:    *abdominal pain  No, but does report 5-6 month history of gas, bloating, some epigastric burning after eating  *blood in stool or urine  No  *chest pain  No  *shortness of breath  No  *coughing or vomiting up blood No  *heart racing or skipping beats  No  *nausea and vomiting  No  *pain with urination  No  *vaginal discharge or bleeding  No  Current medications are:  Current Outpatient Medications   Medication Sig Dispense Refill     fluocinolone acetonide (DERMA-SMOOTHE/FS BODY) 0.01 % oil Apply to areas of hair loss once a week 118.28 mL 1     ketoconazole (NIZORAL) 2 % shampoo Apply topically  daily as needed for itching or irritation 120 mL 3     Prenatal Vit-Fe Fumarate-FA (PRENATAL VITAMIN PO)        vitamin D3 (CHOLECALCIFEROL) 2000 units tablet Take 1 tablet by mouth daily         Genetic Screening  At the time of birth, will you be 35 years old or older?  No  Has the patient, baby s father, or anyone in either family had:  Thalassemia (Italian, Greek, Mediterranean, or  background only) and an MCV result less than 80?  No  Neural tube defect such as meningomyelocele, spina bifida or anencephaly?  No  Congenital heart defect?  No  Down s syndrome?  No  Nino-Sach s disease (Amish, Cajun, French-[place])?  No  Sickle cell disease or trait (Janessa)?  No  Hemophilia or other inherited problems of blood coagulation? No  Muscular dystrophy?  No  Cystic Fibrosis?  No  Miles s chorea?  No  Mental retardation/autism? No   If yes, was the person tested for fragile X?  N/A  Any other inherited genetic or chromosomal disorder?  No  Maternal metabolic disorder (e.g. insulin-dependent diabetes, PKU)? No  A child with birth defects not listed above?  No  Recurrent pregnancy loss or a stillbirth?  No  Has the patient had any medications/street drugs/alcohol since her last menstrual period? No  Does the patient or baby s father have any other genetic risks?  No  Infection History  Do you object to being tested for Hepatitis B? No  Do you object to being tested for HIV? No  Do you feel that you are at high risk for coming in contact with the AIDS virus?  No  Have you ever been treated for tuberculosis?  Yes, as a child  Have you ever received the BCG vaccine for tuberculosis?  No  Have you ever had a positive skin test for tuberculosis? No, but does not get this test due to history of having had the disease  Do you live with someone who has tuberculosis?  No  Have you ever been exposed to tuberculosis?  Yes, had disease as a child  Do you have genital herpes?  No  Does your partner have genital herpes?   No  Have you had a rash or viral illness since your last period?  No  Have you ever had Gonorrhea, Chlamydia, Syphilis, venereal warts, trichomoniasis, pelvic inflammatory disease or any other sexually transmitted disease?  No  Do you know if you are a genital group B streptococcus carrier? Yes, received antibiotic before previous delivery  You have not had chicken pox/varicella  unsure  Have you been vaccinated against chicken pox?  Yes  Have you had any other infectious disease? No        Early ultrasound screening tool:    Does patient have irregular periods?  No  Did patient use hormonal birth control in the three months prior to positive urine pregnancy test? No  Is the patient breastfeeding?  No  Is the patient 10 weeks or greater at time of education visit?  No    Chart routed to Dr. Vega with whom patient is already scheduled to see as she wishes to address her concerns over epigastric burning and any concerns related to her trip to [facility] planned for late January.    Patient declined having CBC repeated today, was just done 11/27/18.    Kerry Middleton RN  [facility]

## 2018-12-10 NOTE — NURSING NOTE
Patient presents to clinic today for prenatal education. This is the patient's 2nd pregnancy. She has had 0 miscarriage(s), 0 (s), 1 term birth(s) and 0  birth(s). She has 1 living child(delmer).   This was a planned pregnancy.  Reviewed answers to patient's Prenatal OB Questionnaire. Screening is positive for having some epigastric burning and bloating after meals for the past 5-6 months, wonders if she has H Pylori; also plans to travel to [place] for a few weeks leaving [place].   Is scheduled for early first OB with Dr. Vega already in order to address those issues. Also has history of positive Group B strep.     Medical, surgical, family and ObGyn history updated as appropriate. Reviewed current medications and allergies. Patient is taking prenatal vitamins.     Discussed all of the options within [place] for her prenatal care including Dr. Oliveros and Dr. Jolly at [facility], midwives at [facility] and OB/GYN-Dr. Poon and Dr. Blake. Next visit scheduled with Dr. Vega (already scheduled before her visit today).     Reviewed and discussed OB 1st Trimester Plans/Education  and also summarized in detail handouts and brochures included in OB Prenatal Folder that patient is able to keep and bring home with her.    Discussed all of the blood tests with pt who agrees to have all including HIV. Pt aware that results will be discussed at next office visit with MD unless abnl results are indicated and phone call will be made.    Will forward chart on to Prenatal Care Provider to review.    Kerry Middleton RN  [facility]

## 2018-12-10 NOTE — Clinical Note
Dr. Vega,I saw Adrian today for prenatal intake, she was already scheduled to see you tomorrow.  She wishes to address her concerns over epigastric burning and any concerns related to her trip to [place] planned for late January.  Patient declined having CBC repeated today, was just done 11/27/18.Kerry Middleton, [facility]

## 2018-12-11 ENCOUNTER — PRENATAL OFFICE VISIT (OUTPATIENT)
Dept: OBGYN | Facility: CLINIC | Age: 33
End: 2018-12-11
Payer: COMMERCIAL

## 2018-12-11 VITALS
WEIGHT: 152.58 LBS | BODY MASS INDEX: 23.12 KG/M2 | SYSTOLIC BLOOD PRESSURE: 121 MMHG | HEIGHT: 68 IN | HEART RATE: 85 BPM | DIASTOLIC BLOOD PRESSURE: 73 MMHG | OXYGEN SATURATION: 100 %

## 2018-12-11 DIAGNOSIS — K29.00 ACUTE GASTRITIS WITHOUT HEMORRHAGE, UNSPECIFIED GASTRITIS TYPE: ICD-10-CM

## 2018-12-11 DIAGNOSIS — Z34.81 ENCOUNTER FOR SUPERVISION OF OTHER NORMAL PREGNANCY IN FIRST TRIMESTER: Primary | ICD-10-CM

## 2018-12-11 DIAGNOSIS — Z12.4 SCREENING FOR CERVICAL CANCER: ICD-10-CM

## 2018-12-11 DIAGNOSIS — E55.9 VITAMIN D DEFICIENCY: ICD-10-CM

## 2018-12-11 LAB
ABO + RH BLD: NORMAL
ABO + RH BLD: NORMAL
BACTERIA SPEC CULT: NORMAL
BLD GP AB SCN SERPL QL: NORMAL
BLOOD BANK CMNT PATIENT-IMP: NORMAL
HBV SURFACE AG SERPL QL IA: NONREACTIVE
HIV 1+2 AB+HIV1 P24 AG SERPL QL IA: NONREACTIVE
RUBV IGG SERPL IA-ACNC: 26 IU/ML
SPECIMEN EXP DATE BLD: NORMAL
SPECIMEN SOURCE: NORMAL
T PALLIDUM AB SER QL: NONREACTIVE

## 2018-12-11 PROCEDURE — 99207 ZZC FIRST OB VISIT: CPT | Performed by: OBSTETRICS & GYNECOLOGY

## 2018-12-11 PROCEDURE — 36415 COLL VENOUS BLD VENIPUNCTURE: CPT | Performed by: OBSTETRICS & GYNECOLOGY

## 2018-12-11 PROCEDURE — 87624 HPV HI-RISK TYP POOLED RSLT: CPT | Performed by: OBSTETRICS & GYNECOLOGY

## 2018-12-11 PROCEDURE — G0145 SCR C/V CYTO,THINLAYER,RESCR: HCPCS | Performed by: OBSTETRICS & GYNECOLOGY

## 2018-12-11 PROCEDURE — 86644 CMV ANTIBODY: CPT | Performed by: OBSTETRICS & GYNECOLOGY

## 2018-12-11 ASSESSMENT — MIFFLIN-ST. JEOR: SCORE: 1449.25

## 2018-12-11 NOTE — PROGRESS NOTES
"Chief Complaint   Patient presents with     Prenatal Care     7+5.       Initial /73 (BP Location: Left arm, Patient Position: Sitting, Cuff Size: Adult Regular)   Pulse 85   Ht 1.717 m (5' 7.6\")   Wt 69.2 kg (152 lb 9.3 oz)   LMP 10/18/2018 (Exact Date)   SpO2 100%   BMI 23.48 kg/m   Estimated body mass index is 23.48 kg/m  as calculated from the following:    Height as of this encounter: 1.717 m (5' 7.6\").    Weight as of this encounter: 69.2 kg (152 lb 9.3 oz).  BP completed using cuff size: regular    Questioned patient about current smoking habits.  Pt. has never smoked.          The following HM Due: pap smear      The following patient reported/Care Every where data was sent to:  P ABSTRACT QUALITY INITIATIVES [26716]  n/a      patient has appointment for today and orders have been placed              "

## 2018-12-11 NOTE — PROGRESS NOTES
SUBJECTIVE:   Adrian Gomez is a  33 year old female  @ 7w5d by LMP who presents for a new Ob visit.  Excited to be pregnant.  Feels hungry all the time, and has stomach pain and nausea.   Denies any bleeding or cramping.     She will be traveling to [place] for her brother's wedding at the end of January.  Had questions about travel, vaccines.  She reports that her  was recently treated for H. pylori and she would like to be tested for that as well.  She has been struggling with heartburn and stomach pain even prior to her pregnancy and wonders if it is H. Pylori.  Patient works in the [employer] [place] [employer] and would like to be tested for CMV.  A coworker was recently diagnosed with CMV during her pregnancy.    History since LMP: mild nausea, fatigue    POBHx:  Obstetric History       T1      L1     SAB0   TAB0   Ectopic0   Multiple0   Live Births1       # Outcome Date GA Lbr Jared/2nd Weight Sex Delivery Anes PTL Lv   2 Current            1 Term 12/04/15 40w5d 05:08  03:48 3.912 kg (8 lb 10 oz) M Vag-Spont EPI  MARY      Name: Jerson      Apgar1:  6                Apgar5: 9             Patient Active Problem List   Diagnosis     CARDIOVASCULAR SCREENING; LDL GOAL LESS THAN 160     Personal history of tuberculosis     Acute cystitis without hematuria     Dermatitis, seborrheic     Supervision of normal pregnancy       Past Medical History:   Diagnosis Date     Personal history of tuberculosis     Finished on medications for 1 year in 2004       Past Surgical History:   Procedure Laterality Date     None          Current Outpatient Medications   Medication     fluocinolone acetonide (DERMA-SMOOTHE/FS BODY) 0.01 % oil     ketoconazole (NIZORAL) 2 % shampoo     Prenatal Vit-Fe Fumarate-FA (PRENATAL VITAMIN PO)     UNABLE TO FIND     vitamin D3 (CHOLECALCIFEROL) 2000 units tablet     No current facility-administered medications for this visit.        No Known  "Allergies      EXAM:  /73 (BP Location: Left arm, Patient Position: Sitting, Cuff Size: Adult Regular)   Pulse 85   Ht 1.717 m (5' 7.6\")   Wt 69.2 kg (152 lb 9.3 oz)   LMP 10/18/2018 (Exact Date)   SpO2 100%   BMI 23.48 kg/m    GENERAL: WDWN F in NAD  HEENT: no abnormalities  NECK: without thyromegaly or adenopathy  BACK: without CVAT or paraspinal tenderness  CHEST: clear to auscultation  CV: RRR without murmur  BREASTS: no palpable masses or adenopathy, no asymmetry or skin dimpling  ABDOMEN: S, NT, no palpable masses or hepatosplenomegaly  MUSCULOSKELETAL: no obvious abnormalities.  NEUROLOGICAL: normal strength, sensation, mental status  PSYCH: normal affect, appropriate  PELVIC: EG - normal adult female.  BUS - within normal limits.  Vagina - well rugated, no discharge.  Cervix - no lesions, no CMT.  Uterus - 8-9 wk size and nontender.  Adnexae - no masses or tenderness.  RV - deferred.    BEDSIDE ABDOMINAL U/S: done to confirm dates and viability.  There is a single live intrauterine pregnancy noted with normal yolk and gestational sacs.  There is normal  cardiac activity.  CRL = 7w6d           ASSESSMENT/ PLAN:  IUP @ at 7w5d by LMP c/w bedside ultrasound today.    (Z34.81) Encounter for supervision of other normal pregnancy in first trimester  (primary encounter diagnosis)  Comment:    Plan: HPV High Risk Types DNA Cervical, Pap imaged         thin layer screen with HPV - recommended age 30        - 65 years (select HPV order below), CMV         Antibody IgG     (E55.9) Vitamin D deficiency  Plan:discussed need to take supplement    (K29.00) Acute gastritis without hemorrhage, unspecified gastritis type  Comment:    Plan: H Pylori antigen, stool         Discussed routine prenatal care and first trimester screen testing.    She will consider  the first trimester screen.    Patient was counselled on healthy lifestyle habits, diet and exercise and all questions answered.  She was referred to the " travel clinic for questions in regards to upcoming travel to [facility].  New Ob labs reviewed today.    Return to Clinic in 4 weeks or sooner if problems arise.    Catina Vega MD

## 2018-12-12 LAB — CMV IGG SERPL QL IA: >8 AI (ref 0–0.8)

## 2018-12-13 DIAGNOSIS — K29.00 ACUTE GASTRITIS WITHOUT HEMORRHAGE, UNSPECIFIED GASTRITIS TYPE: ICD-10-CM

## 2018-12-13 LAB
COPATH REPORT: NORMAL
PAP: NORMAL

## 2018-12-13 PROCEDURE — 87338 HPYLORI STOOL AG IA: CPT | Performed by: OBSTETRICS & GYNECOLOGY

## 2018-12-14 LAB
FINAL DIAGNOSIS: NORMAL
H PYLORI AG STL QL IA: ABNORMAL
HPV HR 12 DNA CVX QL NAA+PROBE: NEGATIVE
HPV16 DNA SPEC QL NAA+PROBE: NEGATIVE
HPV18 DNA SPEC QL NAA+PROBE: NEGATIVE
SPECIMEN DESCRIPTION: NORMAL
SPECIMEN SOURCE CVX/VAG CYTO: NORMAL
SPECIMEN SOURCE: ABNORMAL

## 2018-12-17 ENCOUNTER — TELEPHONE (OUTPATIENT)
Dept: OBGYN | Facility: CLINIC | Age: 33
End: 2018-12-17

## 2018-12-17 NOTE — TELEPHONE ENCOUNTER
Pt is calling for results of H. Pylori.  She is aware that it was positive.  Her pharmacy is noted.  Vika Ornelas RN

## 2018-12-18 NOTE — TELEPHONE ENCOUNTER
Call to patient. Left message for her to make an appt with one of the FP docs at [facility] to discuss dx and treatment.  I do not routinely treat this condition.  Please follow up tomorrow to make sure she is aware of the plan. Thanks. RR

## 2018-12-20 ENCOUNTER — OFFICE VISIT (OUTPATIENT)
Dept: FAMILY MEDICINE | Facility: CLINIC | Age: 33
End: 2018-12-20
Payer: COMMERCIAL

## 2018-12-20 VITALS
WEIGHT: 160 LBS | DIASTOLIC BLOOD PRESSURE: 63 MMHG | BODY MASS INDEX: 24.62 KG/M2 | TEMPERATURE: 97.7 F | OXYGEN SATURATION: 100 % | SYSTOLIC BLOOD PRESSURE: 99 MMHG | HEART RATE: 78 BPM

## 2018-12-20 DIAGNOSIS — E63.9 NUTRITIONAL DEFICIENCY: Primary | ICD-10-CM

## 2018-12-20 PROCEDURE — 36415 COLL VENOUS BLD VENIPUNCTURE: CPT | Performed by: FAMILY MEDICINE

## 2018-12-20 PROCEDURE — 99213 OFFICE O/P EST LOW 20 MIN: CPT | Performed by: FAMILY MEDICINE

## 2018-12-20 PROCEDURE — 82607 VITAMIN B-12: CPT | Performed by: FAMILY MEDICINE

## 2018-12-20 NOTE — PROGRESS NOTES
SUBJECTIVE:   Adrian Gomez is a 33 year old female who presents to clinic today for the following health issues:      Discuss positive H.Pylori and how to treat it since she is pregnant.        Problem list and histories reviewed & adjusted, as indicated.  Additional history: as documented    Current Outpatient Medications   Medication Sig Dispense Refill     CEPHALEXIN PO        fluocinolone acetonide (DERMA-SMOOTHE/FS BODY) 0.01 % oil Apply to areas of hair loss once a week 118.28 mL 1     ketoconazole (NIZORAL) 2 % shampoo Apply topically daily as needed for itching or irritation 120 mL 3     Prenatal Vit-Fe Fumarate-FA (PRENATAL VITAMIN PO)        UNABLE TO FIND MEDICATION NAME: steroid shots in head.       vitamin D3 (CHOLECALCIFEROL) 2000 units tablet Take 1 tablet by mouth daily       No Known Allergies  Recent Labs   Lab Test 11/27/18  1308 05/31/18  0918 08/29/17  1503   ALT  --   --  19   CR  --   --  0.52   GFRESTIMATED  --   --  >90   GFRESTBLACK  --   --  >90   POTASSIUM  --   --  3.7   TSH 1.32 2.63 1.36      BP Readings from Last 3 Encounters:   12/20/18 99/63   12/11/18 121/73   12/10/18 102/60    Wt Readings from Last 3 Encounters:   12/20/18 72.6 kg (160 lb)   12/11/18 69.2 kg (152 lb 9.3 oz)   12/10/18 69.6 kg (153 lb 6.4 oz)             Patient is 8 weeks pregnant  She is not having any hyperemesis  Patient is currently is being seen by OB who requested that we follow-up with her to decide whether she should get treated for H. pylori at this point or not       Reviewed and updated as needed this visit by clinical staff  Tobacco  Allergies  Meds  Med Hx  Surg Hx  Fam Hx  Soc Hx      Reviewed and updated as needed this visit by Provider         O: BP 99/63 (BP Location: Left arm, Patient Position: Sitting, Cuff Size: Adult Regular)   Pulse 78   Temp 97.7  F (36.5  C) (Oral)   Wt 72.6 kg (160 lb)   LMP 10/18/2018 (Exact Date)   SpO2 100%   BMI 24.62 kg/m      Physical exam was not done  today      ICD-10-CM    1. Nutritional deficiency E63.9 Vitamin B12     We spent some time looking up H. pylori in pregnancy and the computer today recommendations were at this point not to treat at least 14 weeks of pregnancy.  Treatment with amoxicillin and Biaxin would be appropriate for short period time probably 10 days.  Metronidazole could be used but probably would be avoided initially.  Other treatments not recommended at this point.      Total time spent face-to-face with the patient: 15 minutes.  Greater than 50% of time was spent in counseling.

## 2018-12-21 LAB — VIT B12 SERPL-MCNC: 340 PG/ML (ref 193–986)

## 2019-01-07 ENCOUNTER — PRENATAL OFFICE VISIT (OUTPATIENT)
Dept: OBGYN | Facility: CLINIC | Age: 34
End: 2019-01-07
Payer: COMMERCIAL

## 2019-01-07 VITALS
OXYGEN SATURATION: 100 % | TEMPERATURE: 97.9 F | SYSTOLIC BLOOD PRESSURE: 110 MMHG | WEIGHT: 158.6 LBS | HEART RATE: 80 BPM | HEIGHT: 68 IN | BODY MASS INDEX: 24.04 KG/M2 | DIASTOLIC BLOOD PRESSURE: 73 MMHG

## 2019-01-07 DIAGNOSIS — Z34.01 ENCOUNTER FOR SUPERVISION OF NORMAL FIRST PREGNANCY IN FIRST TRIMESTER: ICD-10-CM

## 2019-01-07 PROCEDURE — 99207 ZZC PRENATAL VISIT: CPT | Performed by: OBSTETRICS & GYNECOLOGY

## 2019-01-07 ASSESSMENT — MIFFLIN-ST. JEOR: SCORE: 1476.55

## 2019-01-07 NOTE — PROGRESS NOTES
11w4d  No complaints.  H pylori was + and patient reviewed results with primary doc who recommended avoiding treatment in pregnancy.   She started omeprazole and symptoms have improved.  She has been eating better.  Will be leaving soon to [place] [facility] for her brother's wedding.   Unable to obtain FHT's with doppler so bedside US done.  Single live pregnancy with + cardiac activity and FM. RTC in one month.  RR

## 2019-01-21 ENCOUNTER — PRENATAL OFFICE VISIT (OUTPATIENT)
Dept: OBGYN | Facility: CLINIC | Age: 34
End: 2019-01-21
Payer: COMMERCIAL

## 2019-01-21 VITALS
WEIGHT: 160.8 LBS | OXYGEN SATURATION: 100 % | HEIGHT: 68 IN | DIASTOLIC BLOOD PRESSURE: 71 MMHG | BODY MASS INDEX: 24.37 KG/M2 | HEART RATE: 74 BPM | SYSTOLIC BLOOD PRESSURE: 128 MMHG

## 2019-01-21 DIAGNOSIS — Z34.02 ENCOUNTER FOR SUPERVISION OF NORMAL FIRST PREGNANCY IN SECOND TRIMESTER: ICD-10-CM

## 2019-01-21 PROCEDURE — 99207 ZZC PRENATAL VISIT: CPT | Performed by: OBSTETRICS & GYNECOLOGY

## 2019-01-21 ASSESSMENT — MIFFLIN-ST. JEOR: SCORE: 1486.53

## 2019-01-21 NOTE — PROGRESS NOTES
Doing well, plans to go to \[facility] [place] [facility]\"" for 3 weeks; wanted to check heartbeat before she left.  RTC after return.  LT

## 2019-02-19 ENCOUNTER — PRENATAL OFFICE VISIT (OUTPATIENT)
Dept: OBGYN | Facility: CLINIC | Age: 34
End: 2019-02-19
Payer: COMMERCIAL

## 2019-02-19 VITALS
BODY MASS INDEX: 24.4 KG/M2 | OXYGEN SATURATION: 98 % | HEART RATE: 82 BPM | WEIGHT: 161 LBS | HEIGHT: 68 IN | SYSTOLIC BLOOD PRESSURE: 106 MMHG | DIASTOLIC BLOOD PRESSURE: 61 MMHG

## 2019-02-19 DIAGNOSIS — Z34.80 SUPERVISION OF OTHER NORMAL PREGNANCY, ANTEPARTUM: Primary | ICD-10-CM

## 2019-02-19 PROCEDURE — 99207 ZZC PRENATAL VISIT: CPT | Performed by: OBSTETRICS & GYNECOLOGY

## 2019-02-19 ASSESSMENT — MIFFLIN-ST. JEOR: SCORE: 1487.44

## 2019-02-19 NOTE — PROGRESS NOTES
Continued heartburn, will try various OTC.  Wants survey ultrasound, declines quad screen.  RTC 4 weeks.  AM

## 2019-03-04 ENCOUNTER — ANCILLARY PROCEDURE (OUTPATIENT)
Dept: ULTRASOUND IMAGING | Facility: CLINIC | Age: 34
End: 2019-03-04
Attending: OBSTETRICS & GYNECOLOGY
Payer: COMMERCIAL

## 2019-03-04 DIAGNOSIS — Z34.80 SUPERVISION OF OTHER NORMAL PREGNANCY, ANTEPARTUM: ICD-10-CM

## 2019-03-04 PROCEDURE — 76805 OB US >/= 14 WKS SNGL FETUS: CPT | Performed by: OBSTETRICS & GYNECOLOGY

## 2019-03-19 ENCOUNTER — PRENATAL OFFICE VISIT (OUTPATIENT)
Dept: OBGYN | Facility: CLINIC | Age: 34
End: 2019-03-19
Payer: COMMERCIAL

## 2019-03-19 VITALS
HEIGHT: 68 IN | TEMPERATURE: 97.6 F | BODY MASS INDEX: 25.67 KG/M2 | HEART RATE: 85 BPM | SYSTOLIC BLOOD PRESSURE: 108 MMHG | DIASTOLIC BLOOD PRESSURE: 66 MMHG | OXYGEN SATURATION: 100 % | WEIGHT: 169.4 LBS

## 2019-03-19 DIAGNOSIS — Z34.02 ENCOUNTER FOR SUPERVISION OF NORMAL FIRST PREGNANCY IN SECOND TRIMESTER: ICD-10-CM

## 2019-03-19 PROCEDURE — 99207 ZZC PRENATAL VISIT: CPT | Performed by: OBSTETRICS & GYNECOLOGY

## 2019-03-19 ASSESSMENT — PATIENT HEALTH QUESTIONNAIRE - PHQ9
SUM OF ALL RESPONSES TO PHQ QUESTIONS 1-9: 0
5. POOR APPETITE OR OVEREATING: NOT AT ALL

## 2019-03-19 ASSESSMENT — ANXIETY QUESTIONNAIRES
GAD7 TOTAL SCORE: 0
5. BEING SO RESTLESS THAT IT IS HARD TO SIT STILL: NOT AT ALL
IF YOU CHECKED OFF ANY PROBLEMS ON THIS QUESTIONNAIRE, HOW DIFFICULT HAVE THESE PROBLEMS MADE IT FOR YOU TO DO YOUR WORK, TAKE CARE OF THINGS AT HOME, OR GET ALONG WITH OTHER PEOPLE: NOT DIFFICULT AT ALL
6. BECOMING EASILY ANNOYED OR IRRITABLE: NOT AT ALL
3. WORRYING TOO MUCH ABOUT DIFFERENT THINGS: NOT AT ALL
2. NOT BEING ABLE TO STOP OR CONTROL WORRYING: NOT AT ALL
7. FEELING AFRAID AS IF SOMETHING AWFUL MIGHT HAPPEN: NOT AT ALL
1. FEELING NERVOUS, ANXIOUS, OR ON EDGE: NOT AT ALL

## 2019-03-19 ASSESSMENT — MIFFLIN-ST. JEOR: SCORE: 1520.54

## 2019-03-19 NOTE — PROGRESS NOTES
21w5d  Feeling FM.  Normal US. Baby girl!!  complains of feeling bloating and heartburn and reflux. She was diagnosed with h. Pylori at beginning of her pregnancy and did not get It treated d/t the pregnancy.   She has omprazole and takes it sometimes.  I reviewed h pylori in pregnancy and it is usually not treated with abx during pregnancy.   I suggested probiotics and some specific dietary changes to try.  She will also take her omeprazole daily.  discussed GCT NV. RR

## 2019-03-20 ASSESSMENT — ANXIETY QUESTIONNAIRES: GAD7 TOTAL SCORE: 0

## 2019-04-16 ENCOUNTER — PRENATAL OFFICE VISIT (OUTPATIENT)
Dept: OBGYN | Facility: CLINIC | Age: 34
End: 2019-04-16
Payer: COMMERCIAL

## 2019-04-16 VITALS
WEIGHT: 173.2 LBS | HEART RATE: 82 BPM | SYSTOLIC BLOOD PRESSURE: 115 MMHG | DIASTOLIC BLOOD PRESSURE: 75 MMHG | HEIGHT: 68 IN | OXYGEN SATURATION: 100 % | BODY MASS INDEX: 26.25 KG/M2

## 2019-04-16 DIAGNOSIS — Z34.02 ENCOUNTER FOR SUPERVISION OF NORMAL FIRST PREGNANCY IN SECOND TRIMESTER: Primary | ICD-10-CM

## 2019-04-16 DIAGNOSIS — K21.9 GASTRIC REFLUX: ICD-10-CM

## 2019-04-16 LAB — HGB BLD-MCNC: 10.3 G/DL (ref 11.7–15.7)

## 2019-04-16 PROCEDURE — 99207 ZZC PRENATAL VISIT: CPT | Performed by: OBSTETRICS & GYNECOLOGY

## 2019-04-16 PROCEDURE — 82950 GLUCOSE TEST: CPT | Performed by: OBSTETRICS & GYNECOLOGY

## 2019-04-16 PROCEDURE — 36415 COLL VENOUS BLD VENIPUNCTURE: CPT | Performed by: OBSTETRICS & GYNECOLOGY

## 2019-04-16 PROCEDURE — 00000218 ZZHCL STATISTIC OBHBG - HEMOGLOBIN: Performed by: OBSTETRICS & GYNECOLOGY

## 2019-04-16 PROCEDURE — 82306 VITAMIN D 25 HYDROXY: CPT | Performed by: OBSTETRICS & GYNECOLOGY

## 2019-04-16 ASSESSMENT — MIFFLIN-ST. JEOR: SCORE: 1537.78

## 2019-04-16 NOTE — PROGRESS NOTES
25w5d   Ran out of omeprazole and heartburn has been very bad. Given a new rx.  baby is moving well.  GCT today.  Plans to breast feed.    Baby doc will be Susana Michael from [facility].    Baby girl this time. RR

## 2019-04-17 LAB
DEPRECATED CALCIDIOL+CALCIFEROL SERPL-MC: 42 UG/L (ref 20–75)
GLUCOSE 1H P 50 G GLC PO SERPL-MCNC: 125 MG/DL (ref 60–129)

## 2019-05-07 ENCOUNTER — PRENATAL OFFICE VISIT (OUTPATIENT)
Dept: OBGYN | Facility: CLINIC | Age: 34
End: 2019-05-07
Payer: COMMERCIAL

## 2019-05-07 VITALS
WEIGHT: 175 LBS | SYSTOLIC BLOOD PRESSURE: 114 MMHG | OXYGEN SATURATION: 99 % | BODY MASS INDEX: 26.92 KG/M2 | DIASTOLIC BLOOD PRESSURE: 68 MMHG | HEART RATE: 89 BPM

## 2019-05-07 DIAGNOSIS — Z34.03 ENCOUNTER FOR SUPERVISION OF NORMAL FIRST PREGNANCY IN THIRD TRIMESTER: ICD-10-CM

## 2019-05-07 PROCEDURE — 99207 ZZC PRENATAL VISIT: CPT | Performed by: OBSTETRICS & GYNECOLOGY

## 2019-05-07 ASSESSMENT — MIFFLIN-ST. JEOR: SCORE: 1545.94

## 2019-05-07 NOTE — PROGRESS NOTES
28w5d  Baby is moving ok, feeling more tired this pregnancy.  Feels heart racing with physical activity and sometimes even just when she gets up in morning.    Feels SOB when lying on her back.  Patient to work at 6 AM.  Likely not sleeping enough and not staying hydrated.    discussed increasing her hours of sleep at night and hydrating better in AM when she gets up.   Patient declined tdap today.  RR

## 2019-06-04 ENCOUNTER — PRENATAL OFFICE VISIT (OUTPATIENT)
Dept: OBGYN | Facility: CLINIC | Age: 34
End: 2019-06-04
Payer: COMMERCIAL

## 2019-06-04 VITALS
BODY MASS INDEX: 27.74 KG/M2 | WEIGHT: 183 LBS | SYSTOLIC BLOOD PRESSURE: 113 MMHG | DIASTOLIC BLOOD PRESSURE: 68 MMHG | OXYGEN SATURATION: 100 % | HEART RATE: 84 BPM | HEIGHT: 68 IN

## 2019-06-04 DIAGNOSIS — R59.0 ENLARGED LYMPH NODES IN ARMPIT: ICD-10-CM

## 2019-06-04 DIAGNOSIS — Z34.03 ENCOUNTER FOR SUPERVISION OF NORMAL FIRST PREGNANCY IN THIRD TRIMESTER: Primary | ICD-10-CM

## 2019-06-04 PROCEDURE — 99207 ZZC PRENATAL VISIT: CPT | Performed by: OBSTETRICS & GYNECOLOGY

## 2019-06-04 RX ORDER — CLINDAMYCIN HCL 300 MG
CAPSULE ORAL
COMMUNITY
Start: 2019-05-28 | End: 2019-06-18

## 2019-06-04 ASSESSMENT — MIFFLIN-ST. JEOR: SCORE: 1582.23

## 2019-06-04 NOTE — PROGRESS NOTES
32w5d  complains of sore swollen lymph node under left arm.  Has been there for about 2 wks. Patient was seen in urgent care on 5/28 and given cleocin.  Has decreased in size in response to the antibiotics.  .   Similar finding on the right side but that has resolved completely.  On exam there is a 2x3 cm swelling, fluctuant. No area of entrance for infection identified.   Patient reports normal FM.  No ctx's.  Declined tdap today.  RR

## 2019-06-11 ENCOUNTER — HOSPITAL ENCOUNTER (OUTPATIENT)
Facility: CLINIC | Age: 34
End: 2019-06-11
Payer: COMMERCIAL

## 2019-06-13 ENCOUNTER — HOSPITAL ENCOUNTER (OUTPATIENT)
Facility: CLINIC | Age: 34
Discharge: HOME | End: 2019-06-13
Attending: OBSTETRICS & GYNECOLOGY | Admitting: OBSTETRICS & GYNECOLOGY
Payer: COMMERCIAL

## 2019-06-13 ENCOUNTER — TELEPHONE (OUTPATIENT)
Dept: OBGYN | Facility: CLINIC | Age: 34
End: 2019-06-13

## 2019-06-13 VITALS — DIASTOLIC BLOOD PRESSURE: 71 MMHG | TEMPERATURE: 98 F | RESPIRATION RATE: 18 BRPM | SYSTOLIC BLOOD PRESSURE: 119 MMHG

## 2019-06-13 PROBLEM — Z36.89 ENCOUNTER FOR TRIAGE IN PREGNANT PATIENT: Status: ACTIVE | Noted: 2019-06-13

## 2019-06-13 PROCEDURE — 59025 FETAL NON-STRESS TEST: CPT

## 2019-06-13 PROCEDURE — 99213 OFFICE O/P EST LOW 20 MIN: CPT | Mod: 25 | Performed by: OBSTETRICS & GYNECOLOGY

## 2019-06-13 PROCEDURE — G0463 HOSPITAL OUTPT CLINIC VISIT: HCPCS | Mod: 25

## 2019-06-13 PROCEDURE — 59025 FETAL NON-STRESS TEST: CPT | Mod: 26 | Performed by: OBSTETRICS & GYNECOLOGY

## 2019-06-13 NOTE — TELEPHONE ENCOUNTER
TC to pt to let her know that she should go to L&D for monitoring.  Pt stated that she only felt 2 movements since we talked on the phone ~30 minutes ago.  L&D is aware of pt's arrival.  Vika Ornelas RN

## 2019-06-13 NOTE — TELEPHONE ENCOUNTER
Adrian Gomez is a 34 year old  at 34w0d with decreased fetal movement. Recommend she come in to triage for evaluation.   Iveth Blue MD

## 2019-06-13 NOTE — TELEPHONE ENCOUNTER
Pt is 34w.  She states that she is not feeling movement like usual from her baby.  There are some movements, but far less than she's come to expect.  Says it was last night when she last felt the usual movements.  Advised that she lay down now and see how long it takes her to get 10 movements.  Do you want her at the hospital on the monitor now or would you rather wait for results of kick count?

## 2019-06-14 NOTE — PROGRESS NOTES
[facility]  OB Triage Note    CC: Decreased fetal movement     HPI: Ms. Adrian Gomez is a 34 year old  at 34w0d by LMP, who presents with decreased fetal movement. Patient states she normally feels her baby many times throughout the day but this afternoon noticed less movement. She did kick counts and felt 2 movements in an hour. She denies contractions, leaking fluid, vaginal bleeding. She states that since being in triage, fetus has been quite active and she feels overall reassured.     Pregnancy complications:  - GERD, h/o h.pylori treated in early pregnancy     O:  Patient Vitals for the past 24 hrs:   BP Temp Temp src Resp   19 1805 119/71 98  F (36.7  C) Oral 18     Gen: Well-appearing, NAD  CV: Well perfused   Pulm: Nonlabored breathing on room air   Abd: Soft, gravid, non-tender   Ext: Trace LE edema b/l    FHT: Baseline 135, moderate variability, present accelerations, no decelerations  [place]: Quiet    A/P:  Ms. Adrian Gomez is a 34 year old  at 34w0d here with decreased fetal movement. FHT Cat I, reactive. Patient now feeling fetus move. Overall reassured. Discharged to home. Next OB appointment on .     Terrie Obregon MD  OB/GYN, PGY-1  2019, 7:24 PM      Physician Attestation   I, Iveth Blue, personally examined and evaluated this patient.  I discussed the patient with the medical student and/or resident and care team, and agree with the assessment and plan of care as documented in the note of 19 [date].      I personally reviewed vital signs, medications and exam and fetal tracing..    Key findings: 34 year old  at 34w0d with decreased fetal movement. Now feeling active fetal movement. Category 1 fetal tracing, reactive NST. Discharge to home with  labor precautions and kick count instructions. Follow up in clinic as scheduled, sooner PRN.  Iveth Blue MD  Date of Service (when I saw the patient): 19

## 2019-06-14 NOTE — DISCHARGE INSTRUCTIONS
Discharge Instruction for Undelivered Patients      You were seen for: decreased fetal movement  We Consulted: Dr. Blue and Dr. Obregon  You had (Test or Medicine):NST     Diet:   Drink 8 to 12 glasses of liquids (milk, juice, water) every day.  You may eat meals and snacks.     Activity:  Count fetal kicks everyday (see handout)  Call your doctor or nurse midwife if your baby is moving less than usual.     Call your provider if you notice:  Swelling in your face or increased swelling in your hands or legs.  Headaches that are not relieved by Tylenol (acetaminophen).  Changes in your vision (blurring: seeing spots or stars.)  Nausea (sick to your stomach) and vomiting (throwing up).   Weight gain of 5 pounds or more per week.  Heartburn that doesn't go away.  Signs of bladder infection: pain when you urinate (use the toilet), need to go more often and more urgently.  The bag of murcia (rupture of membranes) breaks, or you notice leaking in your underwear.  Bright red blood in your underwear.  Abdominal (lower belly) or stomach pain.  Second (plus) baby: Contractions (tightening) less than 10 minutes apart and getting stronger.  *If less than 34 weeks: Contractions (tightenings) more than 6 times in one hour.  Increase or change in vaginal discharge (note the color and amount)      Follow-up:  As scheduled in the clinic

## 2019-06-14 NOTE — PLAN OF CARE
Data: [facility].   Reason for maternal/fetal assessment per patient is Decreased Fetal Movement  . Patient is a . Prenatal record reviewed.      OB History    Para Term  AB Living   2 1 1 0 0 1   SAB TAB Ectopic Multiple Live Births   0 0 0 0 1      # Outcome Date GA Lbr Jared/2nd Weight Sex Delivery Anes PTL Lv   2 Current            1 Term 12/04/15 40w5d 05:08  03:48 3.912 kg (8 lb 10 oz) M Vag-Spont EPI  MARY      Birth Comments: Hearing screen:  passed  CHD screen: passed  Hep B in hospital: Yes  Low intermediate risk bili      Name: Jerson      Apgar1: 6  Apgar5: 9      Medical History:   Past Medical History:   Diagnosis Date    Personal history of tuberculosis     Finished on medications for 1 year in 2004   . Gestational Age 34w0d. VSS. Cervix: not examined.  Fetal movement present. Patient denies cramping, backache, vaginal discharge, pelvic pressure, UTI symptoms, GI problems, bloody show, vaginal bleeding, edema, headache, visual disturbances, epigastric or URQ pain, abdominal pain, rupture of membranes. Support persons not present.  Action: Verbal consent for EFM. Triage assessment completed. EFM applied for decreased fetal movement. Uterine assessment rare contraction, patient not feeling. Fetal assessment: Presumed adequate fetal oxygenation documented (see flow record). Dr. Obregon and Dr. Blue notified of patient arrival. Came to bedside to assess. Monitor tracing reviewed per Dr. Blue and Dr. Obregon. Monitors taken off per Dr. Blue.  Patient education pamphlets given on fetal movement counts and discharge instructions. Patient instructed to report change in fetal movement, vaginal leaking of fluid or bleeding, abdominal pain, or any concerns related to the pregnancy to her nurse/physician.   Response: Plan per provider is discharge to home with follow up on Monday as scheduled. Patient verbalized understanding of education  and verbalized agreement with plan. Discharged ambulatory at 2000.    I

## 2019-06-14 NOTE — PLAN OF CARE
Pt arrived for decreased fetal movement. EFM and toco applied. Triage assessment completed. MD Arias notified of pt arrival and status.

## 2019-06-18 ENCOUNTER — PRENATAL OFFICE VISIT (OUTPATIENT)
Dept: OBGYN | Facility: CLINIC | Age: 34
End: 2019-06-18
Payer: COMMERCIAL

## 2019-06-18 VITALS
BODY MASS INDEX: 27.95 KG/M2 | WEIGHT: 184.4 LBS | SYSTOLIC BLOOD PRESSURE: 115 MMHG | HEIGHT: 68 IN | OXYGEN SATURATION: 97 % | HEART RATE: 109 BPM | DIASTOLIC BLOOD PRESSURE: 71 MMHG

## 2019-06-18 DIAGNOSIS — Z34.03 ENCOUNTER FOR SUPERVISION OF NORMAL FIRST PREGNANCY IN THIRD TRIMESTER: ICD-10-CM

## 2019-06-18 PROCEDURE — 99207 ZZC PRENATAL VISIT: CPT | Performed by: OBSTETRICS & GYNECOLOGY

## 2019-06-18 ASSESSMENT — MIFFLIN-ST. JEOR: SCORE: 1588.58

## 2019-06-18 NOTE — PROGRESS NOTES
34w5d  No complaints.  Tired.  Did not have air conditioner on last night so did not sleep well.   Baby is moving well.  discussed GBS at next visit.  Patient finished abx for left axillary abcess.  On exam it has resolved 90%. RR

## 2019-07-02 ENCOUNTER — PRENATAL OFFICE VISIT (OUTPATIENT)
Dept: OBGYN | Facility: CLINIC | Age: 34
End: 2019-07-02
Payer: COMMERCIAL

## 2019-07-02 VITALS
OXYGEN SATURATION: 99 % | TEMPERATURE: 98.3 F | BODY MASS INDEX: 28.64 KG/M2 | HEIGHT: 68 IN | DIASTOLIC BLOOD PRESSURE: 82 MMHG | WEIGHT: 189 LBS | SYSTOLIC BLOOD PRESSURE: 125 MMHG | HEART RATE: 61 BPM

## 2019-07-02 DIAGNOSIS — Z34.03 ENCOUNTER FOR SUPERVISION OF NORMAL FIRST PREGNANCY IN THIRD TRIMESTER: ICD-10-CM

## 2019-07-02 LAB — HGB BLD-MCNC: 9.8 G/DL (ref 11.7–15.7)

## 2019-07-02 PROCEDURE — 36415 COLL VENOUS BLD VENIPUNCTURE: CPT | Performed by: OBSTETRICS & GYNECOLOGY

## 2019-07-02 PROCEDURE — 99207 ZZC PRENATAL VISIT: CPT | Performed by: OBSTETRICS & GYNECOLOGY

## 2019-07-02 PROCEDURE — 00000218 ZZHCL STATISTIC OBHBG - HEMOGLOBIN: Performed by: OBSTETRICS & GYNECOLOGY

## 2019-07-02 PROCEDURE — 87653 STREP B DNA AMP PROBE: CPT | Performed by: OBSTETRICS & GYNECOLOGY

## 2019-07-02 ASSESSMENT — MIFFLIN-ST. JEOR: SCORE: 1607.86

## 2019-07-04 LAB
GP B STREP DNA SPEC QL NAA+PROBE: NEGATIVE
SPECIMEN SOURCE: NORMAL

## 2019-07-09 ENCOUNTER — PRENATAL OFFICE VISIT (OUTPATIENT)
Dept: OBGYN | Facility: CLINIC | Age: 34
End: 2019-07-09
Payer: COMMERCIAL

## 2019-07-09 VITALS
WEIGHT: 189.4 LBS | BODY MASS INDEX: 28.7 KG/M2 | OXYGEN SATURATION: 99 % | HEART RATE: 75 BPM | HEIGHT: 68 IN | DIASTOLIC BLOOD PRESSURE: 70 MMHG | SYSTOLIC BLOOD PRESSURE: 106 MMHG

## 2019-07-09 DIAGNOSIS — Z34.03 ENCOUNTER FOR SUPERVISION OF NORMAL FIRST PREGNANCY IN THIRD TRIMESTER: ICD-10-CM

## 2019-07-09 PROCEDURE — 99207 ZZC PRENATAL VISIT: CPT | Performed by: OBSTETRICS & GYNECOLOGY

## 2019-07-09 ASSESSMENT — MIFFLIN-ST. JEOR: SCORE: 1609.67

## 2019-07-09 NOTE — PROGRESS NOTES
"37w5d   Would like cervix check today.  Noted mucus plug 2 days ago.  Intermittent ctx's.  Feeling sharp shooting pain.   Baby is moving well.  She is worried it is a big baby.  She is 5'8\" and it does feel like a substantial size baby.   Will get an US for growth. Consider IOL at 39 - 40 wks.  RR  "

## 2019-07-14 ENCOUNTER — ANESTHESIA (OUTPATIENT)
Dept: OBGYN | Facility: CLINIC | Age: 34
End: 2019-07-14
Payer: COMMERCIAL

## 2019-07-14 ENCOUNTER — HOSPITAL ENCOUNTER (INPATIENT)
Facility: CLINIC | Age: 34
LOS: 2 days | Discharge: HOME HEALTH | End: 2019-07-16
Attending: OBSTETRICS & GYNECOLOGY | Admitting: OBSTETRICS & GYNECOLOGY
Payer: COMMERCIAL

## 2019-07-14 ENCOUNTER — NURSE TRIAGE (OUTPATIENT)
Dept: NURSING | Facility: CLINIC | Age: 34
End: 2019-07-14

## 2019-07-14 ENCOUNTER — ANESTHESIA EVENT (OUTPATIENT)
Dept: OBGYN | Facility: CLINIC | Age: 34
End: 2019-07-14
Payer: COMMERCIAL

## 2019-07-14 PROBLEM — Z34.90 PREGNANCY: Status: ACTIVE | Noted: 2019-07-14

## 2019-07-14 LAB
ABO + RH BLD: NORMAL
ABO + RH BLD: NORMAL
BASOPHILS # BLD AUTO: 0 10E9/L (ref 0–0.2)
BASOPHILS NFR BLD AUTO: 0.1 %
BLD GP AB SCN SERPL QL: NORMAL
BLOOD BANK CMNT PATIENT-IMP: NORMAL
DIFFERENTIAL METHOD BLD: ABNORMAL
EOSINOPHIL # BLD AUTO: 0 10E9/L (ref 0–0.7)
EOSINOPHIL NFR BLD AUTO: 0.4 %
ERYTHROCYTE [DISTWIDTH] IN BLOOD BY AUTOMATED COUNT: 15.6 % (ref 10–15)
HCT VFR BLD AUTO: 32.9 % (ref 35–47)
HGB BLD-MCNC: 10.3 G/DL (ref 11.7–15.7)
IMM GRANULOCYTES # BLD: 0.1 10E9/L (ref 0–0.4)
IMM GRANULOCYTES NFR BLD: 1.1 %
LYMPHOCYTES # BLD AUTO: 1.8 10E9/L (ref 0.8–5.3)
LYMPHOCYTES NFR BLD AUTO: 19.1 %
MCH RBC QN AUTO: 24.4 PG (ref 26.5–33)
MCHC RBC AUTO-ENTMCNC: 31.3 G/DL (ref 31.5–36.5)
MCV RBC AUTO: 78 FL (ref 78–100)
MONOCYTES # BLD AUTO: 0.7 10E9/L (ref 0–1.3)
MONOCYTES NFR BLD AUTO: 7.6 %
NEUTROPHILS # BLD AUTO: 6.6 10E9/L (ref 1.6–8.3)
NEUTROPHILS NFR BLD AUTO: 71.7 %
NRBC # BLD AUTO: 0 10*3/UL
NRBC BLD AUTO-RTO: 0 /100
PLATELET # BLD AUTO: 210 10E9/L (ref 150–450)
RBC # BLD AUTO: 4.22 10E12/L (ref 3.8–5.2)
SPECIMEN EXP DATE BLD: NORMAL
WBC # BLD AUTO: 9.2 10E9/L (ref 4–11)

## 2019-07-14 PROCEDURE — 25000132 ZZH RX MED GY IP 250 OP 250 PS 637: Performed by: STUDENT IN AN ORGANIZED HEALTH CARE EDUCATION/TRAINING PROGRAM

## 2019-07-14 PROCEDURE — 25000128 H RX IP 250 OP 636: Performed by: STUDENT IN AN ORGANIZED HEALTH CARE EDUCATION/TRAINING PROGRAM

## 2019-07-14 PROCEDURE — 3E0R3BZ INTRODUCTION OF ANESTHETIC AGENT INTO SPINAL CANAL, PERCUTANEOUS APPROACH: ICD-10-PCS | Performed by: ANESTHESIOLOGY

## 2019-07-14 PROCEDURE — 86780 TREPONEMA PALLIDUM: CPT | Performed by: STUDENT IN AN ORGANIZED HEALTH CARE EDUCATION/TRAINING PROGRAM

## 2019-07-14 PROCEDURE — 72200001 ZZH LABOR CARE VAGINAL DELIVERY SINGLE

## 2019-07-14 PROCEDURE — 86901 BLOOD TYPING SEROLOGIC RH(D): CPT | Performed by: STUDENT IN AN ORGANIZED HEALTH CARE EDUCATION/TRAINING PROGRAM

## 2019-07-14 PROCEDURE — 86900 BLOOD TYPING SEROLOGIC ABO: CPT | Performed by: STUDENT IN AN ORGANIZED HEALTH CARE EDUCATION/TRAINING PROGRAM

## 2019-07-14 PROCEDURE — 00HU33Z INSERTION OF INFUSION DEVICE INTO SPINAL CANAL, PERCUTANEOUS APPROACH: ICD-10-PCS | Performed by: ANESTHESIOLOGY

## 2019-07-14 PROCEDURE — 86850 RBC ANTIBODY SCREEN: CPT | Performed by: STUDENT IN AN ORGANIZED HEALTH CARE EDUCATION/TRAINING PROGRAM

## 2019-07-14 PROCEDURE — 12000001 ZZH R&B MED SURG/OB UMMC

## 2019-07-14 PROCEDURE — 25000125 ZZHC RX 250

## 2019-07-14 PROCEDURE — 25800030 ZZH RX IP 258 OP 636: Performed by: STUDENT IN AN ORGANIZED HEALTH CARE EDUCATION/TRAINING PROGRAM

## 2019-07-14 PROCEDURE — 25000128 H RX IP 250 OP 636: Performed by: ANESTHESIOLOGY

## 2019-07-14 PROCEDURE — 85025 COMPLETE CBC W/AUTO DIFF WBC: CPT | Performed by: STUDENT IN AN ORGANIZED HEALTH CARE EDUCATION/TRAINING PROGRAM

## 2019-07-14 PROCEDURE — 59400 OBSTETRICAL CARE: CPT | Mod: GC | Performed by: OBSTETRICS & GYNECOLOGY

## 2019-07-14 RX ORDER — FENTANYL CITRATE 50 UG/ML
50-100 INJECTION, SOLUTION INTRAMUSCULAR; INTRAVENOUS
Status: DISCONTINUED | OUTPATIENT
Start: 2019-07-14 | End: 2019-07-14

## 2019-07-14 RX ORDER — NALBUPHINE HYDROCHLORIDE 10 MG/ML
2.5-5 INJECTION, SOLUTION INTRAMUSCULAR; INTRAVENOUS; SUBCUTANEOUS EVERY 6 HOURS PRN
Status: DISCONTINUED | OUTPATIENT
Start: 2019-07-14 | End: 2019-07-14

## 2019-07-14 RX ORDER — ACETAMINOPHEN 325 MG/1
650 TABLET ORAL EVERY 4 HOURS PRN
Status: DISCONTINUED | OUTPATIENT
Start: 2019-07-14 | End: 2019-07-16 | Stop reason: HOSPADM

## 2019-07-14 RX ORDER — PROCHLORPERAZINE 25 MG
25 SUPPOSITORY, RECTAL RECTAL EVERY 12 HOURS PRN
Status: DISCONTINUED | OUTPATIENT
Start: 2019-07-14 | End: 2019-07-14

## 2019-07-14 RX ORDER — NALOXONE HYDROCHLORIDE 0.4 MG/ML
.1-.4 INJECTION, SOLUTION INTRAMUSCULAR; INTRAVENOUS; SUBCUTANEOUS
Status: DISCONTINUED | OUTPATIENT
Start: 2019-07-14 | End: 2019-07-14

## 2019-07-14 RX ORDER — OXYTOCIN/0.9 % SODIUM CHLORIDE 30/500 ML
340 PLASTIC BAG, INJECTION (ML) INTRAVENOUS CONTINUOUS PRN
Status: DISCONTINUED | OUTPATIENT
Start: 2019-07-14 | End: 2019-07-16 | Stop reason: HOSPADM

## 2019-07-14 RX ORDER — ONDANSETRON 2 MG/ML
4 INJECTION INTRAMUSCULAR; INTRAVENOUS EVERY 6 HOURS PRN
Status: DISCONTINUED | OUTPATIENT
Start: 2019-07-14 | End: 2019-07-14

## 2019-07-14 RX ORDER — EPHEDRINE SULFATE 50 MG/ML
INJECTION, SOLUTION INTRAMUSCULAR; INTRAVENOUS; SUBCUTANEOUS
Status: DISCONTINUED
Start: 2019-07-14 | End: 2019-07-14 | Stop reason: HOSPADM

## 2019-07-14 RX ORDER — MISOPROSTOL 200 UG/1
TABLET ORAL
Status: DISCONTINUED
Start: 2019-07-14 | End: 2019-07-14 | Stop reason: HOSPADM

## 2019-07-14 RX ORDER — SODIUM CHLORIDE, SODIUM LACTATE, POTASSIUM CHLORIDE, CALCIUM CHLORIDE 600; 310; 30; 20 MG/100ML; MG/100ML; MG/100ML; MG/100ML
INJECTION, SOLUTION INTRAVENOUS
Status: DISCONTINUED
Start: 2019-07-14 | End: 2019-07-14 | Stop reason: HOSPADM

## 2019-07-14 RX ORDER — OXYTOCIN 10 [USP'U]/ML
10 INJECTION, SOLUTION INTRAMUSCULAR; INTRAVENOUS
Status: DISCONTINUED | OUTPATIENT
Start: 2019-07-14 | End: 2019-07-14

## 2019-07-14 RX ORDER — IBUPROFEN 800 MG/1
800 TABLET, FILM COATED ORAL
Status: COMPLETED | OUTPATIENT
Start: 2019-07-14 | End: 2019-07-14

## 2019-07-14 RX ORDER — OXYTOCIN/0.9 % SODIUM CHLORIDE 30/500 ML
100-340 PLASTIC BAG, INJECTION (ML) INTRAVENOUS CONTINUOUS PRN
Status: COMPLETED | OUTPATIENT
Start: 2019-07-14 | End: 2019-07-14

## 2019-07-14 RX ORDER — OXYCODONE AND ACETAMINOPHEN 5; 325 MG/1; MG/1
1 TABLET ORAL
Status: DISCONTINUED | OUTPATIENT
Start: 2019-07-14 | End: 2019-07-14

## 2019-07-14 RX ORDER — METHYLERGONOVINE MALEATE 0.2 MG/ML
200 INJECTION INTRAVENOUS
Status: DISCONTINUED | OUTPATIENT
Start: 2019-07-14 | End: 2019-07-14

## 2019-07-14 RX ORDER — HYDROCORTISONE 2.5 %
CREAM (GRAM) TOPICAL 3 TIMES DAILY PRN
Status: DISCONTINUED | OUTPATIENT
Start: 2019-07-14 | End: 2019-07-16 | Stop reason: HOSPADM

## 2019-07-14 RX ORDER — IBUPROFEN 800 MG/1
800 TABLET, FILM COATED ORAL EVERY 6 HOURS PRN
Status: DISCONTINUED | OUTPATIENT
Start: 2019-07-14 | End: 2019-07-16 | Stop reason: HOSPADM

## 2019-07-14 RX ORDER — SODIUM CHLORIDE, SODIUM LACTATE, POTASSIUM CHLORIDE, CALCIUM CHLORIDE 600; 310; 30; 20 MG/100ML; MG/100ML; MG/100ML; MG/100ML
INJECTION, SOLUTION INTRAVENOUS CONTINUOUS
Status: DISCONTINUED | OUTPATIENT
Start: 2019-07-14 | End: 2019-07-14

## 2019-07-14 RX ORDER — EPHEDRINE SULFATE 50 MG/ML
5 INJECTION, SOLUTION INTRAMUSCULAR; INTRAVENOUS; SUBCUTANEOUS
Status: DISCONTINUED | OUTPATIENT
Start: 2019-07-14 | End: 2019-07-14

## 2019-07-14 RX ORDER — BISACODYL 10 MG
10 SUPPOSITORY, RECTAL RECTAL DAILY PRN
Status: DISCONTINUED | OUTPATIENT
Start: 2019-07-16 | End: 2019-07-16 | Stop reason: HOSPADM

## 2019-07-14 RX ORDER — CARBOPROST TROMETHAMINE 250 UG/ML
250 INJECTION, SOLUTION INTRAMUSCULAR
Status: DISCONTINUED | OUTPATIENT
Start: 2019-07-14 | End: 2019-07-14

## 2019-07-14 RX ORDER — OXYTOCIN 10 [USP'U]/ML
INJECTION, SOLUTION INTRAMUSCULAR; INTRAVENOUS
Status: DISCONTINUED
Start: 2019-07-14 | End: 2019-07-14 | Stop reason: HOSPADM

## 2019-07-14 RX ORDER — FENTANYL/BUPIVACAINE/NS/PF 2-1250MCG
PLASTIC BAG, INJECTION (ML) INJECTION
Status: DISCONTINUED
Start: 2019-07-14 | End: 2019-07-14 | Stop reason: HOSPADM

## 2019-07-14 RX ORDER — OXYTOCIN/0.9 % SODIUM CHLORIDE 30/500 ML
PLASTIC BAG, INJECTION (ML) INTRAVENOUS
Status: COMPLETED
Start: 2019-07-14 | End: 2019-07-14

## 2019-07-14 RX ORDER — ACETAMINOPHEN 325 MG/1
650 TABLET ORAL EVERY 4 HOURS PRN
Status: DISCONTINUED | OUTPATIENT
Start: 2019-07-14 | End: 2019-07-14

## 2019-07-14 RX ORDER — LANOLIN 100 %
OINTMENT (GRAM) TOPICAL
Status: DISCONTINUED | OUTPATIENT
Start: 2019-07-14 | End: 2019-07-16 | Stop reason: HOSPADM

## 2019-07-14 RX ORDER — NALOXONE HYDROCHLORIDE 0.4 MG/ML
.1-.4 INJECTION, SOLUTION INTRAMUSCULAR; INTRAVENOUS; SUBCUTANEOUS
Status: DISCONTINUED | OUTPATIENT
Start: 2019-07-14 | End: 2019-07-16 | Stop reason: HOSPADM

## 2019-07-14 RX ORDER — OXYTOCIN 10 [USP'U]/ML
10 INJECTION, SOLUTION INTRAMUSCULAR; INTRAVENOUS
Status: DISCONTINUED | OUTPATIENT
Start: 2019-07-14 | End: 2019-07-16 | Stop reason: HOSPADM

## 2019-07-14 RX ORDER — LIDOCAINE HYDROCHLORIDE 10 MG/ML
INJECTION, SOLUTION EPIDURAL; INFILTRATION; INTRACAUDAL; PERINEURAL
Status: DISCONTINUED
Start: 2019-07-14 | End: 2019-07-14 | Stop reason: HOSPADM

## 2019-07-14 RX ORDER — BUPIVACAINE HYDROCHLORIDE 2.5 MG/ML
INJECTION, SOLUTION INFILTRATION; PERINEURAL PRN
Status: DISCONTINUED | OUTPATIENT
Start: 2019-07-14 | End: 2019-07-15 | Stop reason: HOSPADM

## 2019-07-14 RX ORDER — OXYTOCIN/0.9 % SODIUM CHLORIDE 30/500 ML
100 PLASTIC BAG, INJECTION (ML) INTRAVENOUS CONTINUOUS
Status: DISCONTINUED | OUTPATIENT
Start: 2019-07-14 | End: 2019-07-16 | Stop reason: HOSPADM

## 2019-07-14 RX ORDER — AMOXICILLIN 250 MG
2 CAPSULE ORAL 2 TIMES DAILY
Status: DISCONTINUED | OUTPATIENT
Start: 2019-07-14 | End: 2019-07-16 | Stop reason: HOSPADM

## 2019-07-14 RX ORDER — AMOXICILLIN 250 MG
1 CAPSULE ORAL 2 TIMES DAILY
Status: DISCONTINUED | OUTPATIENT
Start: 2019-07-14 | End: 2019-07-16 | Stop reason: HOSPADM

## 2019-07-14 RX ORDER — METOCLOPRAMIDE HYDROCHLORIDE 5 MG/ML
10 INJECTION INTRAMUSCULAR; INTRAVENOUS EVERY 6 HOURS PRN
Status: DISCONTINUED | OUTPATIENT
Start: 2019-07-14 | End: 2019-07-14

## 2019-07-14 RX ADMIN — ACETAMINOPHEN 650 MG: 325 TABLET, FILM COATED ORAL at 23:05

## 2019-07-14 RX ADMIN — FENTANYL CITRATE 100 MCG: 50 INJECTION INTRAMUSCULAR; INTRAVENOUS at 18:23

## 2019-07-14 RX ADMIN — SODIUM CHLORIDE, POTASSIUM CHLORIDE, SODIUM LACTATE AND CALCIUM CHLORIDE 1000 ML: 600; 310; 30; 20 INJECTION, SOLUTION INTRAVENOUS at 18:59

## 2019-07-14 RX ADMIN — OXYTOCIN-SODIUM CHLORIDE 0.9% IV SOLN 30 UNIT/500ML 340 ML/HR: 30-0.9/5 SOLUTION at 19:40

## 2019-07-14 RX ADMIN — BUPIVACAINE HYDROCHLORIDE 5 ML: 2.5 INJECTION, SOLUTION INFILTRATION; PERINEURAL at 19:16

## 2019-07-14 RX ADMIN — Medication 340 ML/HR: at 19:40

## 2019-07-14 RX ADMIN — Medication 10 ML/HR: at 19:16

## 2019-07-14 RX ADMIN — IBUPROFEN 800 MG: 800 TABLET ORAL at 20:00

## 2019-07-14 ASSESSMENT — ENCOUNTER SYMPTOMS: SEIZURES: 0

## 2019-07-14 NOTE — TELEPHONE ENCOUNTER
"She is 38.3 weeks and from 3 to 5am she was kenji every 15min and she had some pink bloody show when she wiped.. She is not kenji now, but wanted to know if she should know if she should come in.  I said not right now.  Do a fetal; kick count, and if she does not get the movements, call back or go in.  Pt agrees with plan,    Yesica Sorenson RN/ [facility]        Additional Information    Negative: Passed out (i.e., lost consciousness, collapsed and was not responding)    Negative: Shock suspected (e.g., cold/pale/clammy skin, too weak to stand, low BP, rapid pulse)    Negative: Difficult to awaken or acting confused (e.g., disoriented, slurred speech)    Negative: [1] SEVERE abdominal pain (e.g., excruciating) AND [2] constant AND [3] present > 1 hour    Negative: Severe bleeding (e.g., continuous red blood from vagina, or large blood clots)    Negative: Umbilical cord hanging out of the vagina (shiny, white, curled appearance, \"like telephone cord\")    Negative: Uncontrollable urge to push (i.e., feels like baby is coming out now)    Negative: Can see baby    Negative: Sounds like a life-threatening emergency to the triager    Negative: [1] First baby (primipara) AND [2] contractions < 6 minutes apart  AND [3] present 2 hours    Negative: [1] History of prior delivery (multipara) AND [2] contractions < 10 minutes apart AND [3] present 1 hour    Negative: [1] History of rapid prior delivery AND [2] contractions < 10 minutes apart    Negative: [1] Leakage of fluid from vagina AND [2] green or brown in color    Negative: [1] Leakage of fluid from vagina AND [2] leakage started > 4 hours ago    Negative: Vaginal bleeding or spotting    Negative: Baby moving less today (e.g., kick count < 5 in 1 hour or < 10 in 2 hours)    Negative: Severe headache or headache that won't go away    Negative: New blurred vision or vision changes    Negative: MODERATE-SEVERE abdominal pain    Negative: Fever " > 100.4 F (38.0 C)    Negative: [1] Leakage of fluid from vagina AND [2] leakage started < 4 hours ago    Negative: Patient sounds very sick or weak to the triager    Negative: [1] First baby (primipara) AND [2] contractions > 5 minutes apart, or for < 2 hours    [1] History of prior delivery (multipara) AND [2] contractions > 10 minutes, or for < 1 hour    Protocols used: PREGNANCY - LABOR-A-

## 2019-07-14 NOTE — H&P
History and Physical     Adrian Gomez MRN# [id number]   YOB: 1985 Age: 34 year old      Date of Admission: 2019    Primary care provider: No Ref-Primary, Physician            HPI:     Adrian Gomez is a 34 year old  at 38w3d by LMP who presents today for painful contractions. She reports painful contractiosn that started at 0300 this morning. She also noted some bloody show. Her contractions became more painful and frequent and so she came to L&D.   No LOF.     Pregnancy notable for:   Anemia    She reports good fetal movement. Denies LOF.      OB History:    OB History    Para Term  AB Living   2 1 1 0 0 1   SAB TAB Ectopic Multiple Live Births   0 0 0 0 1      # Outcome Date GA Lbr Jared/2nd Weight Sex Delivery Anes PTL Lv   2 Current            1 Term 12/04/15 40w5d 05:08 / 03:48 3.912 kg (8 lb 10 oz) M Vag-Spont EPI  MARY      Birth Comments: Hearing screen:  passed  CHD screen: passed  Hep B in hospital: Yes  Low intermediate risk bili      Name: Jerson      Apgar1: 6  Apgar5: 9        Prenatal Lab Results:  Lab Results   Component Value Date    ABO B 12/10/2018    RH Pos 12/10/2018    AS Neg 12/10/2018    HEPBANG Nonreactive 12/10/2018    CHPCRT  2013     Negative for C. trachomatis rRNA by transcription mediated amplification.   A negative result by transcription mediated amplification does not preclude the   presence of C. trachomatis infection because results are dependent on proper   and adequate collection, absence of inhibitors, and sufficient rRNA to be   detected.    GCPCRT  2013     Negative for N. gonorrhoeae rRNA by transcription mediated amplification.   A negative result by transcription mediated amplification does not preclude the   presence of N. gonorrhoeae infection because results are dependent on proper   and adequate collection, absence of inhibitors, and sufficient rRNA to be   detected.    TREPAB Negative 2015    HGB 9.8 (L)  07/02/2019       GBS Status:   Lab Results   Component Value Date    GBS Negative 07/02/2019                Past Medical History:     Past Medical History:   Diagnosis Date     Personal history of tuberculosis 2004    Finished on medications for 1 year in 6/2004             Past Surgical History:     Past Surgical History:   Procedure Laterality Date     None               Social History:     Social History     Tobacco Use     Smoking status: Never Smoker     Smokeless tobacco: Never Used   Substance Use Topics     Alcohol use: Not Currently     Comment: 2 drinks a month, none since pregnant             Family History:     Family History   Problem Relation Age of Onset     Family History Negative Mother      Breast Cancer Mother      Family History Negative Father      Heart Disease Maternal Grandmother      Heart Disease Maternal Grandfather      Heart Disease Paternal Grandmother      Heart Disease Paternal Grandfather      Diabetes No family hx of      Hypertension No family hx of              Immunizations:     Immunization History   Administered Date(s) Administered     HepB 10/27/2003     Influenza (IIV3) PF 12/11/2003, 09/15/2011, 11/03/2015, 09/01/2017     Influenza Vaccine IM 3yrs+ 4 Valent IIV4 12/28/2016     MMR 10/27/2003     Poliovirus, inactivated (IPV) 10/27/2003     TD (ADULT, 7+) 10/27/2003     TDAP Vaccine (Boostrix) 10/20/2015     Varicella 10/27/2003            Allergies:   No Known Allergies          Medications:     Medications Prior to Admission   Medication Sig Dispense Refill Last Dose     vitamin D3 (CHOLECALCIFEROL) 2000 units tablet Take 1 tablet by mouth daily   Past Week at Unknown time     omeprazole (PRILOSEC) 20 MG DR capsule Take 1 capsule (20 mg) by mouth daily May increase to 2 pills daily prn (Patient not taking: Reported on 6/4/2019) 60 capsule 3 More than a month at Unknown time     Prenatal Vit-Fe Fumarate-FA (PRENATAL VITAMIN PO)    7/12/2019 at 0800     Probiotic Product  "(PROBIOTIC DAILY PO)    Unknown at Unknown time             Review of Systems & Physical Exam:     The Review of Systems is negative other than noted in the HPI      Temp 98.2  F (36.8  C) (Oral)   Resp 18   Ht 1.727 m (5' 8\")   LMP 10/18/2018 (Exact Date)   BMI 28.80 kg/m    Gen: well-appearing, uncomfortable with contractions  CV: RRRs  Lungs: Non-labored breathing  Abd: Gravid, non-tender, non-distended  Ext: Trace peripheral extremity edema    Cervix: 5-6/90/-2 (by RN)  Membranes: intact  Presentation: Ceph by BSUS.  Estimated Fetal Weight: 8.5 lb    FHT:  Monitoring External  FHT: Baseline 150 bpm; moderate variability; accels present; variable and intermittent late decelerations  TOCO 3-4 contractions in 10 minutes         Data:   CBC, T&S, RPR     Assessment and Plan:       34 year old  at 38w3d by LMP US, here for spontaneous labor. Pregnancy is uncomplicated but notable for large sized fetus by Leopold's.       # Spontaneous Labor  - Patient desires epidural  - Fetus appears OP on US. Encouraged birthing ball  - Reassess after epidural    # PNC  - Rh positive, Rubella immune, , GBS negative, Hgb 9.8  - Other prenatal labs wnl  - Imaging: Normal Level II US       # FWB:   Cat II tracing, ractive; Ceph by BSUS; EFW 8.5 lb  - Continuous Fetal Monitoring  - Intrauterine resuscitative measures prn      Patient discussed with Dr. Gary Arias MD  Obstetrics & Gynecology, PGY-2  2019 6:31 PM    Physician Attestation   I, Catina Vega, personally examined and evaluated this patient.  I discussed the patient with the resident and care team, and agree with the assessment and plan of care as documented in the note above.   I personally reviewed vital signs, medications, labs, fetal heart tones and toco.  Key findings: Patient is here in active labor.  Fetal status is overall reassuring. nonrepetative variables noted with ctx's, but moderate variability.    Bedside US " done as noted above.  EFW 8-9 lbs.    discussed birthing ball and epidural.  Patient rec'd fentanyl but pain is intense so preparing for epidural.   Expect .   Catina Vega MD   2019

## 2019-07-14 NOTE — ANESTHESIA PREPROCEDURE EVALUATION
Anesthesia Pre-Procedure Evaluation    Patient: Adrian Gomez   MRN:     [id number] Gender:   female   Age:    34 year old :      1985        Preoperative Diagnosis: * No pre-op diagnosis entered *   * No procedures listed *     Past Medical History:   Diagnosis Date     Personal history of tuberculosis 2004    Finished on medications for 1 year in 2004      Past Surgical History:   Procedure Laterality Date     None            Anesthesia Evaluation       history and physical reviewed .      No history of anesthetic complications          ROS/MED HX    ENT/Pulmonary:  - neg pulmonary ROS     Neurologic:  - neg neurologic ROS    (-) seizures   Cardiovascular:  - neg cardiovascular ROS       METS/Exercise Tolerance:     Hematologic:         Musculoskeletal:         GI/Hepatic:  - neg GI/hepatic ROS      (-) GERD   Renal/Genitourinary:         Endo:         Psychiatric:         Infectious Disease:         Malignancy:         Other:                     JZG F[facility] AN PHYSICAL EXAM    Lab Results   Component Value Date    WBC 6.2 2018    HGB 9.8 (L) 2019    HCT 41.1 2018     2018     2017    POTASSIUM 3.7 2017    CHLORIDE 104 2017    CO2 26 2017    BUN 13 2017    CR 0.52 2017    GLC 78 2017    IONA 9.0 2017    ALBUMIN 4.1 2017    PROTTOTAL 7.8 2017    ALT 19 2017    AST 21 2017    ALKPHOS 59 2017    BILITOTAL 0.4 2017    PTT 32 2013    INR 1.00 2013    TSH 1.32 2018    HCG Negative 2013       Preop Vitals  BP Readings from Last 3 Encounters:   19 106/70   19 125/82   19 115/71    Pulse Readings from Last 3 Encounters:   19 75   19 61   19 109      Resp Readings from Last 3 Encounters:   19 18   19 18   12/10/18 20    SpO2 Readings from Last 3 Encounters:   19 99%   19 99%   19 97%      Temp Readings from Last 1  "Encounters:   07/14/19 36.8  C (98.2  F) (Oral)    Ht Readings from Last 1 Encounters:   07/14/19 1.727 m (5' 8\")      Wt Readings from Last 1 Encounters:   07/09/19 85.9 kg (189 lb 6.4 oz)    Estimated body mass index is 28.8 kg/m  as calculated from the following:    Height as of this encounter: 1.727 m (5' 8\").    Weight as of 7/9/19: 85.9 kg (189 lb 6.4 oz).     LDA:            JZG F[facility] AN PLAN NO PONV RULE    neg OB ROS  (-) no pre-eclampsia                 Elias Mendez, DO  "

## 2019-07-15 LAB
HGB BLD-MCNC: 9.7 G/DL (ref 11.7–15.7)
T PALLIDUM AB SER QL: NONREACTIVE

## 2019-07-15 PROCEDURE — 85018 HEMOGLOBIN: CPT | Performed by: STUDENT IN AN ORGANIZED HEALTH CARE EDUCATION/TRAINING PROGRAM

## 2019-07-15 PROCEDURE — 36415 COLL VENOUS BLD VENIPUNCTURE: CPT | Performed by: STUDENT IN AN ORGANIZED HEALTH CARE EDUCATION/TRAINING PROGRAM

## 2019-07-15 PROCEDURE — 12000001 ZZH R&B MED SURG/OB UMMC

## 2019-07-15 PROCEDURE — 25000132 ZZH RX MED GY IP 250 OP 250 PS 637: Performed by: STUDENT IN AN ORGANIZED HEALTH CARE EDUCATION/TRAINING PROGRAM

## 2019-07-15 RX ORDER — IBUPROFEN 600 MG/1
600 TABLET, FILM COATED ORAL EVERY 6 HOURS PRN
Qty: 60 TABLET | Refills: 0 | Status: SHIPPED | OUTPATIENT
Start: 2019-07-15 | End: 2019-09-10

## 2019-07-15 RX ORDER — AMOXICILLIN 250 MG
1 CAPSULE ORAL DAILY
Qty: 100 TABLET | Refills: 0 | Status: SHIPPED | OUTPATIENT
Start: 2019-07-15 | End: 2020-12-07

## 2019-07-15 RX ORDER — ACETAMINOPHEN 325 MG/1
650 TABLET ORAL EVERY 6 HOURS PRN
Qty: 100 TABLET | Refills: 0 | Status: SHIPPED | OUTPATIENT
Start: 2019-07-15

## 2019-07-15 RX ADMIN — IBUPROFEN 800 MG: 800 TABLET, FILM COATED ORAL at 21:36

## 2019-07-15 RX ADMIN — ACETAMINOPHEN 650 MG: 325 TABLET, FILM COATED ORAL at 18:30

## 2019-07-15 RX ADMIN — IBUPROFEN 800 MG: 800 TABLET, FILM COATED ORAL at 02:07

## 2019-07-15 RX ADMIN — IBUPROFEN 800 MG: 800 TABLET, FILM COATED ORAL at 08:55

## 2019-07-15 RX ADMIN — IBUPROFEN 800 MG: 800 TABLET, FILM COATED ORAL at 15:30

## 2019-07-15 RX ADMIN — SENNOSIDES AND DOCUSATE SODIUM 2 TABLET: 8.6; 5 TABLET ORAL at 20:00

## 2019-07-15 RX ADMIN — ACETAMINOPHEN 650 MG: 325 TABLET, FILM COATED ORAL at 13:08

## 2019-07-15 RX ADMIN — SENNOSIDES AND DOCUSATE SODIUM 1 TABLET: 8.6; 5 TABLET ORAL at 08:55

## 2019-07-15 NOTE — LACTATION NOTE
This note was copied from a baby's chart.  Consult for: Second baby having pain with latch, creased nipple afterwards, infant biting.    History: Vaginal delivery last evening @ 38w3d, AGA infant @ 8# 3 oz. birthweight, excellent diaper output.  Maternal history of TB treated for a full year, finished in 2004.     Breast exam of mom: Soft, symmetrical with intact, everted nipples bilaterally. Mom reports early tenderness & bilateral breast growth during pregnancy. She  her first baby for 18 months but reports it was very difficult in first 2 months, used nipple shield and lots of pumping, was very painful and a struggle.     Oral exam of baby:  Visually looks like limited length of tongue beyond lingual attachment but palpates WNL, infant sucking on finger with some biting, she did extend well beyond gumline when she did organize suck.     Feeding assessment: Hands on assist to get deeper latch, demo first then had mom do with assist. Able to show her tips for deeper latch and she return demo, point out nutritive suck and swallowing she could see and hear.     Education provided: Discussed positioning & using pillows/blankets for support, anatomy of breast and infant mouth for feedings, ways to get and maintain deep latch, breast compressions during feedings to enhance milk transfer, point out nutritive suck and how to hear swallows, benefits of skin to skin and feeding on cue, benefits of breast massage and hand expression, how to tell when satiated, what to expect in the coming days and preventing engorgement. Encouraged mom to review breastfeeding log with nurses prior to discharge, reviewed breastfeeding resource list adding in [email or web address] and additional community resources.     Plan: Frequent skin to skin, breastfeed on cue with goal of 8 to 12 feedings in 24 hours, watch for early cues. Hand express after feedings and spoon feed results until milk is in. Follow up with outpatient lactation as needed  @ [facility].

## 2019-07-15 NOTE — PLAN OF CARE
Data: Patient admitted to room 473 at 1800. Patient is a . Prenatal record reviewed.   OB History    Para Term  AB Living   2 1 1 0 0 1   SAB TAB Ectopic Multiple Live Births   0 0 0 0 1      # Outcome Date GA Lbr Jared/2nd Weight Sex Delivery Anes PTL Lv   2 Current            1 Term 12/04/15 40w5d 05:08 / 03:48 3.912 kg (8 lb 10 oz) M Vag-Spont EPI  MARY      Birth Comments: Hearing screen:  passed  CHD screen: passed  Hep B in hospital: Yes  Low intermediate risk bili      Name: Jerson      Apgar1: 6  Apgar5: 9   .  Medical History:   Past Medical History:   Diagnosis Date    Personal history of tuberculosis     Finished on medications for 1 year in 2004   .  Gestational age 38w3d. Vital signs per doc flowsheet. Fetal movement present. Patient reports Contractions   as reason for admission. Support persons is present.  Action:  Care of patient assumed at 1743. Verbal consent for EFM, external fetal monitors applied. Admission assessment completed. Patient and support persons educated on labor process. Patient instructed to report change in fetal movement, contractions, vaginal leaking of fluid or bleeding, abdominal pain, or any concerns related to the pregnancy to her nurse/physician. Patient oriented to room, call light in reach.   Response: Dr. Garcia informed of patient arrival. Plan per provider is as ordered. Patient verbalized understanding of education and verbalized agreement with plan. Patient coping with labor via family support.

## 2019-07-15 NOTE — PLAN OF CARE
Stable postpartum. Pain managed with medication. Independent with self and  cares. Assisted with lactation and positioning, using a nipple shield. Lactation consult placed.

## 2019-07-15 NOTE — DISCHARGE SUMMARY
[facility] Discharge Summary    Adrian Gomez MRN# [id number]   Age: 34 year old YOB: 1985     Date of Admission:  2019  Date of Discharge:  7/15/2019  Admitting Physician:  Catina Vega MD  Discharge Physician:  Marissa Mcqueen MD    Admit Dx:   - Intrauterine pregnancy at 38w3d   - Anemia  - Spontaneous labor    Discharge Dx:  - Same as above, s/p   - Acute anemia of blood loss, asymptomatic    Procedures:  - Spontaneous vaginal delivery  - Epidural analgesia    Admit HPI/Labor Course:  Adrian Gomez is a 34 year old now  who presented at 38w3d for spontaneous labor.  Pregnancy was notable for anemia. She desired an epidural and received one. Labor course was uncomplicated.  While sitting up for epidural she had SROM clear fluid and felt the desire to push.    She pushed for over 3-4 contractions, after which she had a spontaneous vaginal delivery of a viable fmale infant in RHONDA position with nuchal arm.  No nuchal cord was noted.  Apgars of 9 and 9.  The cord was double clamped after 60 seconds and cut.  A cord segment and cord blood were obtained.  30U of IV pitocin was started. The placenta was then delivered using gentle traction and suprapubic pressure.  The uterus was noted to be firm after fundal massage.  The perineum was assessed for lacerations and none were noted.  On final examination of the perineum, the repair was noted to be hemostatic.  Total QBL was 40 ml.  The placenta appeared intact with a 3V umbilical cord.  Dr. Vega was present for the entire procedure.      Please see her Admission H&P and Delivery Summary for further details.    Postpartum Course:  Her postpartum course was complicated by nothing. On PPD#1, she was meeting all of her postpartum goals and deemed stable for discharge. She was voiding without difficulty, tolerating a regular diet without nausea and vomiting, her pain was well controlled on oral pain medicines and  her lochia was appropriate. Her hemoglobin prior to delivery was 10.3 and after delivery was 9.7. Her Rh status was positive, and Rhogam was not indicated.     Discharge Medications:   Adrian Gomez   Home Medication Instructions BYRON:[id number]    Printed on:07/15/19 0630   Medication Information                      acetaminophen (TYLENOL) 325 MG tablet  Take 2 tablets (650 mg) by mouth every 6 hours as needed for mild pain Start after Delivery.             ibuprofen (ADVIL/MOTRIN) 600 MG tablet  Take 1 tablet (600 mg) by mouth every 6 hours as needed for moderate pain Start after delivery             omeprazole (PRILOSEC) 20 MG DR capsule  Take 1 capsule (20 mg) by mouth daily May increase to 2 pills daily prn             Prenatal Vit-Fe Fumarate-FA (PRENATAL VITAMIN PO)               Probiotic Product (PROBIOTIC DAILY PO)               senna-docusate (SENOKOT-S/PERICOLACE) 8.6-50 MG tablet  Take 1 tablet by mouth daily Start after delivery.             vitamin D3 (CHOLECALCIFEROL) 2000 units tablet  Take 1 tablet by mouth daily                 Discharge/Disposition:  Adrian Gomez was discharged to home in stable condition with the following instructions/medications:  1) Call for temperature > 100.4, bright red vaginal bleeding >1 pad an hour x 2 hours, foul smelling vaginal discharge, pain not controlled by usual oral pain meds, persistent nausea and vomiting not controlled on medications  2) She desired LARC for contraception.  3) For feeding she decided to breastfeed.  4) She was instructed to follow-up with her primary OB in 6 weeks for a routine postpartum visit.    Cely Domingo MD  OBGYN Resident, PGY1

## 2019-07-15 NOTE — PLAN OF CARE
Patient arrived to [facility] unit via wheelchair at 2140 ,with belongings, accompanied by spouse/ significant other, with infant in arms. Received report from Lizett STONE  and checked bands. Unit and room orientation completd. Call light given; no concerns present at this time. Continue with plan of care.

## 2019-07-15 NOTE — L&D DELIVERY NOTE
OB Vaginal Delivery Note    Adrian Gomez MRN# [id number]   Age: 34 year old YOB: 1985       L&D Delivery Note:     Adrian Gomez is a 34 year old now  who presented at 38w3d for spontaneous labor.  Pregnancy was notable for anemia. She desired an epidural and received one. Labor course was uncomplicated.  While sitting up for epidural she had SROM clear fluid and felt the desire to push.    She pushed for over 3-4 contractions, after which she had a spontaneous vaginal delivery of a viable fmale infant in RHONDA position with nuchal arm.  No nuchal cord was noted.  Apgars of 9 and 9.  The cord was double clamped after 60 seconds and cut.  A cord segment and cord blood were obtained.  30U of IV pitocin was started. The placenta was then delivered using gentle traction and suprapubic pressure.  The uterus was noted to be firm after fundal massage.  The perineum was assessed for lacerations and none were noted.    Total QBL was 40 ml.  The placenta appeared intact with a 3V umbilical cord.  Dr. Vega was present for the entire procedure.     Cecilia Arias MD  Obstetrics & Gynecology, PGY-2  2019 7:48 PM      Physician Attestation   I was present for this uncomplicated vaginal delivery.   Details as noted above. Mom and baby stable following birth.      Catina Vega MD   2019           GA: 38w3d  GP:   Labor Complications: None   EBL:    mL  QBL:    Delivery Type: Vaginal, Spontaneous   ROM to Delivery Time: 0h 11m   Weight: 3.714 kg (8 lb 3 oz)    1 Minute 5 Minute 10 Minute   Apgar Totals: 9    9          CATINA VEGA;CECILIA ARIAS;KATEY CARDONA;COLUMBA VICTORIA;SERGO COMBS     Delivery Details:  Adrian Gomez, a 34 year old  female delivered a viable infant with apgars of 9   and 9  . Patient was fully dilated and pushing after 11  hours 15  minutes in active labor. Delivery was via vaginal, spontaneous  to a sterile field under  epidural;intravenous regional  anesthesia. Infant delivered in vertex  left  occiput  anterior  position. Anterior and posterior shoulders delivered without difficulty. The cord was clamped, cut twice and 3 vessels  were noted. Cord blood was obtained in routine fashion with the following disposition: discard .      Cord complications: none   Placenta delivered at 2019  7:35 PM . Placental disposition was Hospital disposal . Fundal massage performed and fundus found to be firm.     Episiotomy: none    Perineum, vagina, cervix were inspected, and the following lacerations were noted:   Perineal lacerations: none                       Excellent hemostasis was noted. Needle count correct. Infant and patient in delivery room in good and stable condition.        Labor Event Times    Labor onset date:  19 Onset time:   8:00 AM   Dilation complete date:  19 Complete time:   7:15 PM   Start pushing date/time:  2019 1920      Labor Length    1st Stage (hrs):  11 (min):  15   2nd Stage (hrs):  0 (min):  11   3rd Stage (hrs):  0 (min):  9      Labor Events     labor?:  No   steroids:  None  Labor Type:  Spontaneous  Predominate monitoring during 1st stage:  continuous electronic fetal monitoring     Antibiotics received during labor?:  No     Rupture date/time: 19 191   Rupture type:  Spontaneous rupture of membranes occuring during spontaneous labor or augmentation     1:1 continuous labor support provided by?:  RN       Delivery/Placenta Date and Time    Delivery Date:  19 Delivery Time:   7:26 PM   Placenta Date/Time:  2019  7:35 PM  Oxytocin given at the time of delivery:  after delivery of baby     Vaginal Counts     Initial count performed by 2 team members:   Two Team Members   Dr. Gary Phan       Needles Suture [place] Sponges Instruments   Initial counts 2  5    Added to count       Final counts 2  5    Placed during labor Accounted for at the end of  "labor   No NA   No NA   No NA    Final count performed by 2 team members:   Two Team Members   Lizett Vega      Final count correct?:  Yes     Apgars    Living status:  Living   1 Minute 5 Minute 10 Minute 15 Minute 20 Minute   Skin color: 1  1       Heart rate: 2  2       Reflex irritability: 2  2       Muscle tone: 2  2       Respiratory effort: 2  2       Total: 9  9       Apgars assigned by:  ASYA TUCKER RN     Cord    Vessels:  3 Vessels Complications:  None   Cord Blood Disposition:  Discard Gases Sent?:  No      [place] Resuscitation     Care at Delivery:  Delivery of a vigorous baby girl, cried spontaneously and brought to mom chest.       Measurements    Weight:  8 lb 3 oz Length:  1' 7.5\"   Head circumference:  35.6 cm       Labor Events and Shoulder Dystocia    Fetal Tracing Prior to Delivery:  Category 2  Fetal Tracing Comments:  intermittent variable decelerations, moderate variability throughout  Shoulder dystocia present?:  Neg     Delivery (Maternal) (Provider to Complete) ([id number])    Episiotomy:  None  Perineal lacerations:  None    Vaginal laceration?:  No    Cervical laceration?:  No       Blood Loss  Mother: Adrian Gomez #[id number]   Start of Mother's Information    IO Blood Loss  19 0800 - 19 2322    Total QBL Blood Loss (mL) Hospital Encounter 91 mL    Total  91 mL         End of Mother's Information  Mother: Adrian Gomez #[id number]         Delivery - Provider to Complete ([id number])    Delivering clinician:  Catina Vega MD  Attempted Delivery Types (Choose all that apply):  Spontaneous Vaginal Delivery  Delivery Type (Choose the 1 that will go to the Birth History):  Vaginal, Spontaneous   Other personnel:   Provider Role   Catina Vega MD Obstetrician   Cecilia Arias MD Resident   Sylvester, Lizett Koo, RN Registered Nurse   Asya Tucker, RN Registered Nurse   Monie Blake RN Registered Nurse         Placenta  "   Delayed Cord Clamping:  Done  Date/Time:  7/14/2019  7:35 PM  Removal:  Expressed  Comments:  normal, intact placenta with 3 VC  Disposition:  Hospital disposal     Anesthesia    Method:  Epidural, INTRAVENOUS REGIONAL          Presentation and Position    Presentation:  Vertex  Position:  Left Occiput Anterior           Catina Vega MD

## 2019-07-15 NOTE — PROGRESS NOTES
Post Partum Progress Note  PPD#1    Subjective:  She is resting comfortably in bed this morning. She complains of feeling tired. Pain is improving and well controlled on current medication regimen. She is tolerating PO intake. Lochia present and similar to a heavy period.  She is voiding without difficulty. She has passed flatus and has not had a BM. She is ambulating without dizziness or difficulty.  She denies headache, changes in vision, nausea/vomiting, chest pain, shortness of breath, RUQ pain, or worsening edema.  Plans to breast feed which is going okay.    Objective:  Vitals:    19   BP: 140/60 116/67 116/75 122/72   Resp: 18 18 18 18   Temp:       TempSrc:       SpO2: 98% 98% 98% 98%   Height:           General: NAD, resting comfortably  CV: Regular rate, well perfused.   Pulm: Normal respiratory effort.  Abd: Soft, non-tender, non-distended. Fundus is firm and 1 cm below the umbilicus.    Ext: 1+ lower extremity edema bilaterally. No calf tenderness.    Assessment/Plan:  Adrian Gomez is a 34 year old  female who is  PPD#2 s/p .    - Encourage routine post-operative goals including ambulation and incentive spirometry  - PNC: Rh positive. Rubella immune. No intervention indicated.  - Pain: controlled on oral medications  - Heme: Hgb 10.2>EBL 40>AM Hgb pending.  If <10 will discharge home with iron.  - GI: continue anti-emetics and stool softeners as needed.  - : Voiding spontaneously.  - Infant: Stable in room  - Feeding: Plans on breastfeeding.  - BC: Discussed options. Considering LARC    Discharge to home on  PPD#1-2      Cecilia Arias MD  Obstetrics & Gynecology, PGY-2  7/15/2019 6:29 AM

## 2019-07-15 NOTE — PLAN OF CARE
Data: Adrian Gomez transferred to 7122 via wheelchair at 2135. Baby transferred via parent's arms.  Action: Receiving unit notified of transfer: Yes. Patient and family notified of room change. Report given to Joce STONE at 2140. Belongings sent to receiving unit. Accompanied by Registered Nurse. Oriented patient to surroundings. Call light within reach. ID bands double-checked with receiving RN.  Response: Patient tolerated transfer and is stable.

## 2019-07-15 NOTE — PLAN OF CARE
of viable Female with Dr. Vega in attandance. Nursery BERTHA ORDONEZ present.  Infant with spontaneous cry, to mothers abdomen, dried and stimulated.  Apgars 9/9 .  Placenta delivered without complication, Pitocin bolused, no laceration no repair, kathya cares provided.  Initial QBL 40 mL.Mother and baby in stable condition.

## 2019-07-15 NOTE — PLAN OF CARE
Doing well.  Cramping is well-controlled.  No fevers.  Good appetite.  Ambulatory.  Breastfeeding well, latch verified.Bonding with infant. No concerns at this time. Will continue with routine PP cares.

## 2019-07-15 NOTE — PLAN OF CARE
Patient's vss, postpartum assessment WDL. Patient is bonding well with infant. Continues to need assistance with breastfeeding.

## 2019-07-15 NOTE — ANESTHESIA PROCEDURE NOTES
Epidural Procedure Note    Staff:     Anesthesiologist:  Elias Mendez DO  Location: OB   Pre-procedure checklist:   patient identified, IV checked, site marked, risks and benefits discussed, informed consent, monitors and equipment checked, pre-op evaluation, at physician/surgeon's request and post-op pain management      Correct Patient: Yes      Correct Position: Yes      Correct Site: Yes      Correct Procedure: Yes      Correct Laterality:  Yes    Site Marked:  Yes  Procedure:     Procedure:  Epidural catheter    ASA:  2    Diagnosis:  Labor    Position:  Sitting    Sterile Prep: Betadine, mask and sterile gloves      Insertion site:  L3-4    Local skin infiltration:  2% lidocaine    amount (mL):  3    Approach:  Midline    Needle Length (in):  3.5    Block Needle Type:  Touhy    Injection Technique:  LORT saline    SUSANNAH at (cm):  6    Attempts:  1    Redirects:  0    Catheter gauge (G):  19    Catheter threaded easily: Yes      Threaded to cm at skin:  10    Paresthesias:  No    Aspiration negative for Heme or CSF: Yes      Test dose (mL):  3     Local anesthetic:  Lidocaine 1.5% w/ 1:200,000 epinephrine    Test dose negative for signs of intravascular, subdural or intrathecal injection: Yes    Assessment/Narrative:      Informed consent obtained.  All risks and benefits of the epidural/spinal discussed with the patient.  All questions answered and all parties agreed with the plan.

## 2019-07-16 VITALS
HEART RATE: 74 BPM | SYSTOLIC BLOOD PRESSURE: 129 MMHG | RESPIRATION RATE: 16 BRPM | TEMPERATURE: 97.7 F | HEIGHT: 68 IN | BODY MASS INDEX: 28.8 KG/M2 | OXYGEN SATURATION: 98 % | DIASTOLIC BLOOD PRESSURE: 79 MMHG

## 2019-07-16 PROCEDURE — 25000132 ZZH RX MED GY IP 250 OP 250 PS 637: Performed by: STUDENT IN AN ORGANIZED HEALTH CARE EDUCATION/TRAINING PROGRAM

## 2019-07-16 RX ORDER — FERROUS SULFATE 325(65) MG
325 TABLET ORAL
Qty: 30 TABLET | Refills: 0 | Status: SHIPPED | OUTPATIENT
Start: 2019-07-16 | End: 2019-09-10

## 2019-07-16 RX ADMIN — IBUPROFEN 800 MG: 800 TABLET, FILM COATED ORAL at 09:02

## 2019-07-16 RX ADMIN — IBUPROFEN 800 MG: 800 TABLET, FILM COATED ORAL at 03:14

## 2019-07-16 RX ADMIN — SENNOSIDES AND DOCUSATE SODIUM 2 TABLET: 8.6; 5 TABLET ORAL at 09:02

## 2019-07-16 NOTE — PROGRESS NOTES
Post Partum Progress Note  PPD#2     Subjective:  She is resting comfortably in bed this morning. She complains of feeling tired. Pain is improving and well controlled on current medication regimen. She is tolerating PO intake. Lochia present and similar to a light period.  She is voiding without difficulty. She has passed flatus and has not had a BM. She is ambulating without dizziness or difficulty.  She denies headache, changes in vision, nausea/vomiting, chest pain, shortness of breath, RUQ pain, or worsening edema. Reports mild back pain that is worse with walking. Plans to breast feed which is going okay. Has no other complaints and is ready to go home today.     Objective:  Vitals          Vitals:     07/15/19 0132 07/15/19 0800 07/15/19 1521 07/15/19 2029   BP: 128/80 131/85 124/78 127/82   Pulse: 78 76 80 82   Resp: 16 16 16 16   Temp: 97.6  F (36.4  C) 97.3  F (36.3  C) 98.4  F (36.9  C) 98  F (36.7  C)   TempSrc: Oral Oral Oral Oral   SpO2:           Height:                    General: NAD, resting comfortably  CV: Regular rate, well perfused.   Pulm: Normal respiratory effort.  Abd: Soft, non-tender, non-distended. Fundus is firm and 1 cm below the umbilicus.    Ext: 1+ lower extremity edema bilaterally. No calf tenderness.     Assessment/Plan:  Adrian Gomez is a 34 year old  female who is  PPD#2 s/p .     - Encourage routine post-operative goals including ambulation and incentive spirometry  - PNC: Rh positive. Rubella immune. No intervention indicated.  - Pain: controlled on oral medications  - Heme: Hgb 10.2>EBL 40>9.7. Will discharge home with iron.  - GI: continue anti-emetics and stool softeners as needed.  - : Voiding spontaneously.  - Infant: Stable in room  - Feeding: Plans on breastfeeding.  - BC: Discussed options. Considering LARC     Discharge to home today     Cecilia Arias MD  Obstetrics & Gynecology, PGY-2  2019 6:54 AM      Physician Attestation   Nelda AKBAR  Lou, saw and evaluated this patient prior to discharge.  I discussed the patient with the resident/fellow and agree with plan of care as documented in the note.      I personally reviewed vital signs, medications, labs and exam.    I personally spent 15 minutes on discharge activities.    Doing well. Stable for discharge home. Precautions reviewed.     Nelda Blake MD  Date of Service (when I saw the patient): 07/16/19

## 2019-07-16 NOTE — PLAN OF CARE
VSS. PP assessments wnl. Voiding without difficulties. Breastfeeding well. Bonding well with infant. Will continue with plan of care.

## 2019-07-16 NOTE — DISCHARGE SUMMARY
[facility] Discharge Summary    Adrian Gomez MRN# [id number]   Age: 34 year old YOB: 1985     Date of Admission:  2019  Date of Discharge:  2019  Admitting Physician:  Catina Vega MD  Discharge Physician:  Nelda Blake MD    Admit Dx:   - Intrauterine pregnancy at 38w3d   - Anemia  - Spontaneous labor    Discharge Dx:  - Same as above, s/p   - Acute anemia of blood loss, asymptomatic    Procedures:  - Spontaneous vaginal delivery  - Epidural analgesia    Admit HPI/Labor Course:  Adrian Gomez is a 34 year old now  who presented at 38w3d for spontaneous labor.  Pregnancy was notable for anemia. She desired an epidural and received one. Labor course was uncomplicated.  While sitting up for epidural she had SROM clear fluid and felt the desire to push.    She pushed for over 3-4 contractions, after which she had a spontaneous vaginal delivery of a viable fmale infant in RHONDA position with nuchal arm.  No nuchal cord was noted.  Apgars of 9 and 9.  The cord was double clamped after 60 seconds and cut.  A cord segment and cord blood were obtained.  30U of IV pitocin was started. The placenta was then delivered using gentle traction and suprapubic pressure.  The uterus was noted to be firm after fundal massage.  The perineum was assessed for lacerations and none were noted.  On final examination of the perineum, the repair was noted to be hemostatic.  Total QBL was 40 ml.  The placenta appeared intact with a 3V umbilical cord.  Dr. Vega was present for the entire procedure.      Please see her Admission H&P and Delivery Summary for further details.    Postpartum Course:  Her postpartum course was complicated by nothing. On PPD#2, she was meeting all of her postpartum goals and deemed stable for discharge. She was voiding without difficulty, tolerating a regular diet without nausea and vomiting, her pain was well controlled on oral pain medicines and  her lochia was appropriate. Her hemoglobin prior to delivery was 10.3 and after delivery was 9.7. Her Rh status was positive, and Rhogam was not indicated.     Discharge Medications:   Adrian Gomez   Home Medication Instructions BYRON:[id number]    Printed on:07/15/19 0781   Medication Information                      acetaminophen (TYLENOL) 325 MG tablet  Take 2 tablets (650 mg) by mouth every 6 hours as needed for mild pain Start after Delivery.             ibuprofen (ADVIL/MOTRIN) 600 MG tablet  Take 1 tablet (600 mg) by mouth every 6 hours as needed for moderate pain Start after delivery             omeprazole (PRILOSEC) 20 MG DR capsule  Take 1 capsule (20 mg) by mouth daily May increase to 2 pills daily prn             Prenatal Vit-Fe Fumarate-FA (PRENATAL VITAMIN PO)               Probiotic Product (PROBIOTIC DAILY PO)               senna-docusate (SENOKOT-S/PERICOLACE) 8.6-50 MG tablet  Take 1 tablet by mouth daily Start after delivery.             vitamin D3 (CHOLECALCIFEROL) 2000 units tablet  Take 1 tablet by mouth daily                 Discharge/Disposition:  Adrian Gomez was discharged to home in stable condition with the following instructions/medications:  1) Call for temperature > 100.4, bright red vaginal bleeding >1 pad an hour x 2 hours, foul smelling vaginal discharge, pain not controlled by usual oral pain meds, persistent nausea and vomiting not controlled on medications  2) She desired LARC for contraception.  3) For feeding she decided to breastfeed.  4) She was instructed to follow-up with her primary OB in 6 weeks for a routine postpartum visit.    Cecilia Arias MD  Obstetrics & Gynecology, PGY-2  7/16/2019 12:06 AM        Physician Attestation   Nelda AKBAR, saw and evaluated this patient prior to discharge.  I discussed the patient with the resident/fellow and agree with plan of care as documented in the note.      I personally reviewed vital signs, medications, labs and  exam.    I personally spent 15 minutes on discharge activities.    Doing well. Stable for discharge home. Precautions reviewed.     Nelda Blake MD  Date of Service (when I saw the patient): 07/16/19

## 2019-07-16 NOTE — PLAN OF CARE
VSS. Postpartum assessment WDL. Up ad ludin. Declined signs and symptoms of preeclampsia. No nausea/vomiting. Breastfeeding with minimal support. Spouse present, supportive with cares. Would like to go home today.

## 2019-07-16 NOTE — PLAN OF CARE
VSS and postpartum assessment WDL. Pain managed well with ibuprofen; knows acetaminophen is available. Uterus firm, midline; scant lochia rubra. Breastfeeding with minimal support. Reports nipple tenderness; encouraged attention to deep latch and positioning. Declined lanolin. Bonding well with baby and independent with cares. Breast pump and discharge meds given. Discharge education completed, except patient declined assigned videos. Discharge to home.

## 2019-08-05 ENCOUNTER — TELEPHONE (OUTPATIENT)
Dept: OBGYN | Facility: CLINIC | Age: 34
End: 2019-08-05

## 2019-08-05 NOTE — TELEPHONE ENCOUNTER
Forms received and filled out. Placed in provider's box to be signed. Will fax to [phone] when returned.  Wendi Cartagena,

## 2019-08-28 PROBLEM — Z34.90 SUPERVISION OF NORMAL PREGNANCY: Status: RESOLVED | Noted: 2018-12-10 | Resolved: 2019-08-28

## 2019-09-10 ENCOUNTER — PRENATAL OFFICE VISIT (OUTPATIENT)
Dept: OBGYN | Facility: CLINIC | Age: 34
End: 2019-09-10
Payer: COMMERCIAL

## 2019-09-10 VITALS
DIASTOLIC BLOOD PRESSURE: 82 MMHG | WEIGHT: 163.8 LBS | HEART RATE: 76 BPM | SYSTOLIC BLOOD PRESSURE: 122 MMHG | HEIGHT: 68 IN | BODY MASS INDEX: 24.83 KG/M2 | OXYGEN SATURATION: 97 %

## 2019-09-10 PROBLEM — Z34.90 PREGNANCY: Status: RESOLVED | Noted: 2019-07-14 | Resolved: 2019-09-10

## 2019-09-10 PROBLEM — Z36.89 ENCOUNTER FOR TRIAGE IN PREGNANT PATIENT: Status: RESOLVED | Noted: 2019-06-13 | Resolved: 2019-09-10

## 2019-09-10 PROCEDURE — 99207 ZZC POST PARTUM EXAM: CPT | Performed by: OBSTETRICS & GYNECOLOGY

## 2019-09-10 RX ORDER — ACETAMINOPHEN AND CODEINE PHOSPHATE 120; 12 MG/5ML; MG/5ML
0.35 SOLUTION ORAL DAILY
Qty: 28 TABLET | Refills: 11 | Status: SHIPPED | OUTPATIENT
Start: 2019-09-10 | End: 2020-12-07

## 2019-09-10 ASSESSMENT — PATIENT HEALTH QUESTIONNAIRE - PHQ9
5. POOR APPETITE OR OVEREATING: NOT AT ALL
SUM OF ALL RESPONSES TO PHQ QUESTIONS 1-9: 1

## 2019-09-10 ASSESSMENT — ANXIETY QUESTIONNAIRES
3. WORRYING TOO MUCH ABOUT DIFFERENT THINGS: NOT AT ALL
GAD7 TOTAL SCORE: 0
2. NOT BEING ABLE TO STOP OR CONTROL WORRYING: NOT AT ALL
IF YOU CHECKED OFF ANY PROBLEMS ON THIS QUESTIONNAIRE, HOW DIFFICULT HAVE THESE PROBLEMS MADE IT FOR YOU TO DO YOUR WORK, TAKE CARE OF THINGS AT HOME, OR GET ALONG WITH OTHER PEOPLE: NOT DIFFICULT AT ALL
7. FEELING AFRAID AS IF SOMETHING AWFUL MIGHT HAPPEN: NOT AT ALL
5. BEING SO RESTLESS THAT IT IS HARD TO SIT STILL: NOT AT ALL
6. BECOMING EASILY ANNOYED OR IRRITABLE: NOT AT ALL
1. FEELING NERVOUS, ANXIOUS, OR ON EDGE: NOT AT ALL

## 2019-09-10 ASSESSMENT — MIFFLIN-ST. JEOR: SCORE: 1501.49

## 2019-09-10 NOTE — PROGRESS NOTES
"Chief Complaint   Patient presents with     Post Partum Exam       Initial /82 (BP Location: Left arm, Patient Position: Sitting, Cuff Size: Adult Regular)   Pulse 76   Ht 1.727 m (5' 8\")   Wt 74.3 kg (163 lb 12.8 oz)   SpO2 97%   Breastfeeding? Yes   BMI 24.91 kg/m   Estimated body mass index is 24.91 kg/m  as calculated from the following:    Height as of this encounter: 1.727 m (5' 8\").    Weight as of this encounter: 74.3 kg (163 lb 12.8 oz).  BP completed using cuff size: regular    Questioned patient about current smoking habits.  Pt. has never smoked.          The following HM Due: NONE      The following patient reported/Care Every where data was sent to:  P ABSTRACT QUALITY INITIATIVES [52256]  n/a      n/a and patient has appointment for today        SUBJECTIVE:  Adrian Gomez is a 34 year old female  here for a postpartum visit.  She had a  delivering a healthy baby girl weighing 8 lbs 3 oz at term.      delivery complications:  No  breast feeding:  Yes, going well.   bladder problems:  No  bowel problems/hemorrhoids:  No  episiotomy/laceration/incision healed? Yes  vaginal flow:  None  [place]:  No  contraception:  oral contraceptive  emotional adjustment:  doing well and happy  back to work:  Not yet  Lab Results   Component Value Date    PAP NIL 2018    PAP NIL 2015    PAP NIL 2013      PHQ-9 SCORE 2016 3/19/2019 9/10/2019   PHQ-9 Total Score - - -   PHQ-9 Total Score 0 0 1         OBJECTIVE:  Blood pressure 122/82, pulse 76, height 1.727 m (5' 8\"), weight 74.3 kg (163 lb 12.8 oz), SpO2 97 %, currently breastfeeding.   General - pleasant female in no acute distress.  Breast - no nodularity, asymmetry or nipple discharge bilaterally.  Abdomen - soft, nontender, nondistended, no hepatosplenomegaly.  Pelvic - EG: normal adult female, BUS: within normal limits, Vagina: well rugated, no discharge, Cervix: no lesions or CMT, Uterus: firm, normal sized and " nontender, Adnexae: no masses or tenderness.  Rectovaginal - deferred.    ASSESSMENT:  normal postpartum exam    PLAN:  May resume normal activities without restrictions  Pap smear was not  done today  none  The patient will use oral contraceptive for birth control. Full counseling was provided, and all questions answered.    Return to clinic in one year for an annual, when due for a pap smear or PRN.  Catina Vega MD

## 2019-09-11 ASSESSMENT — ANXIETY QUESTIONNAIRES: GAD7 TOTAL SCORE: 0

## 2019-10-23 NOTE — PROGRESS NOTES
"Subjective     Adrian Gomez is a 34 year old female who presents to clinic today for the following health issues:    HPI   Hemorrhoids       Duration: ongoing     Description:   Pain: YES  Itching: YES    Accompanying signs and symptoms:   Blood in stool: YES  Changes in stool pattern: no     History (similar episodes/previous evaluation): None    Precipitating or alleviating factors: None    Therapies tried and outcome: increased fiber in diet, increased fluids and preparation H    Patient was given stool softeners but stopped them early.  Notes she has frequent constipation.  Also notes that she did notice the pain when passing a firm stool.     Review of Systems   ROS COMP: Constitutional, HEENT, cardiovascular, pulmonary, gi and gu systems are negative, except as otherwise noted.      Objective    /60   Pulse 77   Temp 98.1  F (36.7  C) (Oral)   Resp 18   Ht 1.727 m (5' 8\")   Wt 72.3 kg (159 lb 6.4 oz)   SpO2 99%   BMI 24.24 kg/m    Body mass index is 24.24 kg/m .  Physical Exam   GENERAL: healthy, alert and no distress  RECTAL (female): normal sphincter tone, sm external hemorrhoid not tender.  Marked tenderness with exam of anal sphincter c/w fissure, but not visualized.  No blood evident.  Unable to palpate shelf due to pain.     Diagnostic Test Results:  none         Assessment & Plan     1. Anal fissure  - Vaseline as directed.     2. External hemorrhoids    3. Slow transit constipation  - polyethylene glycol (MIRALAX/GLYCOLAX) packet; Take 17 g by mouth daily  Dispense: 30 packet; Refill: 3  - Ok to take laxative today only.     4. Need for prophylactic vaccination and inoculation against influenza  - INFLUENZA VACCINE IM > 6 MONTHS VALENT IIV4 [86026]  - Vaccine Administration, Initial [64883]     Use medication as directed.  Follow up if symptoms should persist, change or worsen.  Patient amenable to this follow up plan.     No follow-ups on file.    MIKEY Galloway " [facility]

## 2019-10-24 ENCOUNTER — OFFICE VISIT (OUTPATIENT)
Dept: FAMILY MEDICINE | Facility: CLINIC | Age: 34
End: 2019-10-24
Payer: COMMERCIAL

## 2019-10-24 VITALS
SYSTOLIC BLOOD PRESSURE: 110 MMHG | OXYGEN SATURATION: 99 % | RESPIRATION RATE: 18 BRPM | DIASTOLIC BLOOD PRESSURE: 60 MMHG | HEART RATE: 77 BPM | WEIGHT: 159.4 LBS | TEMPERATURE: 98.1 F | BODY MASS INDEX: 24.16 KG/M2 | HEIGHT: 68 IN

## 2019-10-24 DIAGNOSIS — K59.01 SLOW TRANSIT CONSTIPATION: ICD-10-CM

## 2019-10-24 DIAGNOSIS — Z23 NEED FOR PROPHYLACTIC VACCINATION AND INOCULATION AGAINST INFLUENZA: ICD-10-CM

## 2019-10-24 DIAGNOSIS — K64.4 EXTERNAL HEMORRHOIDS: ICD-10-CM

## 2019-10-24 DIAGNOSIS — K60.2 ANAL FISSURE: Primary | ICD-10-CM

## 2019-10-24 PROCEDURE — 99213 OFFICE O/P EST LOW 20 MIN: CPT | Mod: 25 | Performed by: PHYSICIAN ASSISTANT

## 2019-10-24 PROCEDURE — 90686 IIV4 VACC NO PRSV 0.5 ML IM: CPT | Performed by: PHYSICIAN ASSISTANT

## 2019-10-24 PROCEDURE — 90471 IMMUNIZATION ADMIN: CPT | Performed by: PHYSICIAN ASSISTANT

## 2019-10-24 RX ORDER — POLYETHYLENE GLYCOL 3350 17 G/17G
1 POWDER, FOR SOLUTION ORAL DAILY
Qty: 30 PACKET | Refills: 3 | Status: SHIPPED | OUTPATIENT
Start: 2019-10-24 | End: 2020-12-07

## 2019-10-24 ASSESSMENT — MIFFLIN-ST. JEOR: SCORE: 1481.53

## 2019-10-24 NOTE — PATIENT INSTRUCTIONS
Patient Education     Treating Constipation    Constipation is a common and often uncomfortable problem. Constipation means you have bowel movements fewer than 3 times per week, or strain to pass hard, dry stool. It can last a short time. Or it can be a problem that never seems to go away. The good news is that it can often be treated and controlled.  Eat more fiber  One of the best ways to help treat constipation is to increase your fiber intake. You can do this either through diet or by using fiber supplements. Fiber (in whole grains, fruits, and vegetables) adds bulk and absorbs water to soften the stool. This helps the stool pass through the colon more easily. When you increase your fiber intake, do it slowly to avoid side effects such as bloating. Also increase the amount of water that you drink. Eating more of the following foods can add fiber to your diet.    High-fiber cereals    Whole grains, bran, and brown rice    Vegetables such as carrots, broccoli, and greens    Fresh fruits (especially apples, pears, and dried fruits like raisins and apricots)    Nuts and legumes (especially beans such as lentils, kidney beans, and lima beans)  Get physically active  Exercise helps improve the working of your colon which helps ease constipation. Try to get some physical activity every day. If you haven t been active for a while, talk to your healthcare provider before starting again.  Laxatives  Your healthcare provider may suggest an over-the-counter product to help ease your constipation. He or she may suggest the use of bulk-forming agents or laxatives. The use of laxatives, if used as directed, is common and safe. Follow directions carefully when using them. See your healthcare provider for new-onset constipation, or long-term constipation, to rule out other causes such as medicines or thyroid disease.  Date Last Reviewed: 7/1/2016 2000-2018 [employer] [address], [place]  [address]. All rights reserved. This information is not intended as a substitute for professional medical care. Always follow your healthcare professional's instructions.

## 2020-01-15 ENCOUNTER — MEDICAL CORRESPONDENCE (OUTPATIENT)
Dept: HEALTH INFORMATION MANAGEMENT | Facility: CLINIC | Age: 35
End: 2020-01-15

## 2020-01-22 ENCOUNTER — OFFICE VISIT (OUTPATIENT)
Dept: FAMILY MEDICINE | Facility: CLINIC | Age: 35
End: 2020-01-22
Payer: COMMERCIAL

## 2020-01-22 VITALS
DIASTOLIC BLOOD PRESSURE: 64 MMHG | OXYGEN SATURATION: 100 % | HEART RATE: 83 BPM | HEIGHT: 67 IN | BODY MASS INDEX: 24.88 KG/M2 | TEMPERATURE: 96.8 F | WEIGHT: 158.5 LBS | SYSTOLIC BLOOD PRESSURE: 108 MMHG | RESPIRATION RATE: 16 BRPM

## 2020-01-22 DIAGNOSIS — K64.4 EXTERNAL HEMORRHOIDS: Primary | ICD-10-CM

## 2020-01-22 PROCEDURE — 99214 OFFICE O/P EST MOD 30 MIN: CPT | Performed by: PHYSICIAN ASSISTANT

## 2020-01-22 ASSESSMENT — PAIN SCALES - GENERAL: PAINLEVEL: MODERATE PAIN (5)

## 2020-01-22 ASSESSMENT — MIFFLIN-ST. JEOR: SCORE: 1466.7

## 2020-01-22 NOTE — PROGRESS NOTES
"Subjective     Adrian Gomez is a 34 year old female who presents to clinic today for the following health issues:    HPI   Hemorrhoids       Duration: ongoing last visit 10/24/19.    Description:   Pain: YES  Itching: YES    Accompanying signs and symptoms:   Blood in stool: YES alittle.  Changes in stool pattern: no     History (similar episodes/previous evaluation): October 24, 2019     Precipitating or alleviating factors: None    Therapies tried and outcome: miralax,  Vaseline, stool softeners and eating more fiber. Stop Miralax unable to drink.      Review of Systems   ROS COMP: Constitutional, HEENT, cardiovascular, pulmonary, gi and gu systems are negative, except as otherwise noted.      Objective    /64   Pulse 83   Temp 96.8  F (36  C) (Oral)   Resp 16   Ht 1.71 m (5' 7.32\")   Wt 71.9 kg (158 lb 8 oz)   SpO2 100%   BMI 24.59 kg/m    Body mass index is 24.59 kg/m .  Physical Exam   GENERAL: healthy, alert and no distress  RESP: lungs clear to auscultation - no rales, rhonchi or wheezes  CV: regular rate and rhythm, normal S1 S2, no S3 or S4, no murmur, click or rub, no peripheral edema and peripheral pulses strong  ABDOMEN: soft, nontender, no hepatosplenomegaly, no masses and bowel sounds normal  RECTAL (female): sm external and internal hemorrhoid and pain with exam.  No blood in vault.     Diagnostic Test Results:  none         Assessment & Plan     1. External hemorrhoids  - hydrocortisone (ANUSOL-HC) 2.5 % cream; Place rectally 2 times daily as needed for hemorrhoids  Dispense: 30 g; Refill: 1       Follow up if symptoms should persist, change or worsen.  Sigmoidoscopy then if warranted.   Patient amenable to this follow up plan.     Daron Cooper PA-C  [facility]        "

## 2020-02-05 ENCOUNTER — OFFICE VISIT (OUTPATIENT)
Dept: FAMILY MEDICINE | Facility: CLINIC | Age: 35
End: 2020-02-05
Payer: COMMERCIAL

## 2020-02-05 VITALS
SYSTOLIC BLOOD PRESSURE: 100 MMHG | HEART RATE: 90 BPM | TEMPERATURE: 96.8 F | HEIGHT: 67 IN | OXYGEN SATURATION: 100 % | WEIGHT: 158 LBS | DIASTOLIC BLOOD PRESSURE: 60 MMHG | RESPIRATION RATE: 16 BRPM | BODY MASS INDEX: 24.8 KG/M2

## 2020-02-05 DIAGNOSIS — K64.9 HEMORRHOIDS, UNSPECIFIED HEMORRHOID TYPE: Primary | ICD-10-CM

## 2020-02-05 PROCEDURE — 99213 OFFICE O/P EST LOW 20 MIN: CPT | Performed by: PHYSICIAN ASSISTANT

## 2020-02-05 ASSESSMENT — MIFFLIN-ST. JEOR: SCORE: 1464.43

## 2020-02-05 NOTE — PROGRESS NOTES
"Subjective     Adrian Gomez is a 34 year old female who presents to clinic today for the following health issues:    HPI   Patient presents with:  RECHECK: Hemorrhiods    Patient notes that there was no relief with rectal steroids.  She is amenable to follow up sigmoidoscopy at this time.     Review of Systems   ROS COMP: Constitutional, HEENT, cardiovascular, pulmonary, gi and gu systems are negative, except as otherwise noted.      Objective    /60   Pulse 90   Temp 96.8  F (36  C) (Oral)   Resp 16   Ht 1.71 m (5' 7.32\")   Wt 71.7 kg (158 lb)   SpO2 100%   BMI 24.51 kg/m    Body mass index is 24.51 kg/m .     Physical Exam   Total visit time is 15 Minutes with > 10 Minutes spent in care and consultation regarding rectal pain/hemorroids with orders and follow up plan.      Diagnostic Test Results:  none         Assessment & Plan     1. Hemorrhoids, unspecified hemorrhoid type r/o colitis  - GASTROENTEROLOGY ADULT REF PROCEDURE ONLY       Follow up on results.  Patient amenable to this follow up plan.     Daron Cooper PA-C  [facility]      "

## 2020-02-14 ENCOUNTER — OFFICE VISIT (OUTPATIENT)
Dept: FAMILY MEDICINE | Facility: CLINIC | Age: 35
End: 2020-02-14
Payer: COMMERCIAL

## 2020-02-14 VITALS
SYSTOLIC BLOOD PRESSURE: 122 MMHG | DIASTOLIC BLOOD PRESSURE: 80 MMHG | WEIGHT: 160.8 LBS | RESPIRATION RATE: 17 BRPM | HEART RATE: 62 BPM | BODY MASS INDEX: 25.24 KG/M2 | HEIGHT: 67 IN | TEMPERATURE: 97.6 F | OXYGEN SATURATION: 100 %

## 2020-02-14 DIAGNOSIS — K60.2 ANAL FISSURE: Primary | ICD-10-CM

## 2020-02-14 PROCEDURE — 99214 OFFICE O/P EST MOD 30 MIN: CPT | Performed by: FAMILY MEDICINE

## 2020-02-14 ASSESSMENT — MIFFLIN-ST. JEOR: SCORE: 1477.13

## 2020-02-14 NOTE — PROGRESS NOTES
Subjective     Adrian Gomez is a 34 year old female who presents to clinic today for the following health issues:  HPI   pain with having a Bowel Movement  Had constipation in the past which has resolved  Had a Baby last year  Has daily BM  But has pain anal area  No Bleeding now  Onset: Patient states it has been a long time about 2019    Description:   Pain: YES  Itching: YES    Accompanying Signs & Symptoms:  Blood streaked toilet paper: not currently but before yes  Blood in stool: not currently but when it first started  Changes in stool pattern: no     History:   Any previous GI studies done:none  Family History of colon cancer: no     Precipitating factors:   None    Alleviating factors:  None  Therapies Tried and outcome: Patient states she saw Daron Cooper 3 times for this same issues and was prescribed a cream         Patient Active Problem List   Diagnosis     CARDIOVASCULAR SCREENING; LDL GOAL LESS THAN 160     Personal history of tuberculosis     Acute cystitis without hematuria     Dermatitis, seborrheic     Enlarged lymph nodes in armpit      (normal spontaneous vaginal delivery)     Past Surgical History:   Procedure Laterality Date     None         Social History     Tobacco Use     Smoking status: Never Smoker     Smokeless tobacco: Never Used   Substance Use Topics     Alcohol use: Not Currently     Comment: 2 drinks a month, none since pregnant     Family History   Problem Relation Age of Onset     Family History Negative Mother      Breast Cancer Mother      Family History Negative Father      Heart Disease Maternal Grandmother      Heart Disease Maternal Grandfather      Heart Disease Paternal Grandmother      Heart Disease Paternal Grandfather      Diabetes No family hx of      Hypertension No family hx of          Current Outpatient Medications   Medication Sig Dispense Refill     acetaminophen (TYLENOL) 325 MG tablet Take 2 tablets (650 mg) by mouth every 6 hours as needed for  "mild pain Start after Delivery. 100 tablet 0     Docusate Calcium (STOOL SOFTENER PO)        hydrocortisone (ANUSOL-HC) 2.5 % cream Place rectally 2 times daily as needed for hemorrhoids 30 g 1     nifedipine 0.2% in white petrolatum 0.2 % OINT ointment Apply topically 2 times daily 100 g 0     Prenatal Vit-Fe Fumarate-FA (PRENATAL VITAMIN PO)        vitamin D3 (CHOLECALCIFEROL) 2000 units tablet Take 1 tablet by mouth daily       norethindrone (MICRONOR) 0.35 MG tablet Take 1 tablet (0.35 mg) by mouth daily (Patient not taking: Reported on 2/14/2020) 28 tablet 11     polyethylene glycol (MIRALAX/GLYCOLAX) packet Take 17 g by mouth daily (Patient not taking: Reported on 1/22/2020) 30 packet 3     senna-docusate (SENOKOT-S/PERICOLACE) 8.6-50 MG tablet Take 1 tablet by mouth daily Start after delivery. (Patient not taking: Reported on 9/10/2019) 100 tablet 0     No Known Allergies  Recent Labs   Lab Test 11/27/18  1308 05/31/18  0918 08/29/17  1503   ALT  --   --  19   CR  --   --  0.52   GFRESTIMATED  --   --  >90   GFRESTBLACK  --   --  >90   POTASSIUM  --   --  3.7   TSH 1.32 2.63 1.36      BP Readings from Last 3 Encounters:   02/14/20 122/80   02/05/20 100/60   01/22/20 108/64    Wt Readings from Last 3 Encounters:   02/14/20 72.9 kg (160 lb 12.8 oz)   02/05/20 71.7 kg (158 lb)   01/22/20 71.9 kg (158 lb 8 oz)                    Reviewed and updated as needed this visit by Provider  Problems         Review of Systems   ROS COMP:   Rest of the ROS is Negative except see above and Problem list [stable]        Objective    /80 (BP Location: Left arm, Cuff Size: Adult Regular)   Pulse 62   Temp 97.6  F (36.4  C) (Oral)   Resp 17   Ht 1.71 m (5' 7.32\")   Wt 72.9 kg (160 lb 12.8 oz)   SpO2 100%   BMI 24.94 kg/m    Body mass index is 24.94 kg/m .  Physical Exam   GENERAL: healthy, alert and no distress  ABDOMEN: soft, nontender, no hepatosplenomegaly, no masses and bowel sounds normal  RECTAL (male): " normal sphincter tone, no rectal masses and anal Fissure 6 PM position seen with a anoscope    Diagnostic Test Results:  Labs reviewed in Epic  none         Assessment & Plan     1. Anal fissure  Advised increase Fiber in diet  Pt has appointment with a specialist in 2 weeks and she will follow up with Them  - nifedipine 0.2% in white petrolatum 0.2 % OINT ointment; Apply topically 2 times daily  Dispense: 100 g; Refill: 0     Jessica Nunez MD  [facility]

## 2020-12-02 NOTE — PROGRESS NOTES
"Chief Complaint   Patient presents with     IUD       Initial /70 (BP Location: Left arm, Patient Position: Sitting, Cuff Size: Adult Regular)   Pulse 69   Temp 99.2  F (37.3  C) (Oral)   Ht 1.71 m (5' 7.32\")   Wt 75.6 kg (166 lb 11.2 oz)   LMP 2020   Breastfeeding Yes   BMI 25.86 kg/m   Estimated body mass index is 25.86 kg/m  as calculated from the following:    Height as of this encounter: 1.71 m (5' 7.32\").    Weight as of this encounter: 75.6 kg (166 lb 11.2 oz).  BP completed using cuff size: regular    Questioned patient about current smoking habits.  Pt. has never smoked.          The following HM Due: NONE      The following patient reported/Care Every where data was sent to:  P ABSTRACT QUALITY INITIATIVES [25152]  n/a      n/a and patient has appointment for today       PARAGARD   LOT: [id number]   EXP: 10/2024   NDC: 23230-623-19       "

## 2020-12-07 ENCOUNTER — OFFICE VISIT (OUTPATIENT)
Dept: OBGYN | Facility: CLINIC | Age: 35
End: 2020-12-07
Payer: COMMERCIAL

## 2020-12-07 VITALS
TEMPERATURE: 99.2 F | SYSTOLIC BLOOD PRESSURE: 107 MMHG | WEIGHT: 166.7 LBS | DIASTOLIC BLOOD PRESSURE: 70 MMHG | HEIGHT: 67 IN | BODY MASS INDEX: 26.16 KG/M2 | HEART RATE: 69 BPM

## 2020-12-07 DIAGNOSIS — Z30.430 ENCOUNTER FOR INSERTION OF INTRAUTERINE CONTRACEPTIVE DEVICE: Primary | ICD-10-CM

## 2020-12-07 DIAGNOSIS — Z30.430 ENCOUNTER FOR IUD INSERTION: ICD-10-CM

## 2020-12-07 DIAGNOSIS — A04.8 H. PYLORI INFECTION: ICD-10-CM

## 2020-12-07 LAB — HCG UR QL: NEGATIVE

## 2020-12-07 PROCEDURE — 58300 INSERT INTRAUTERINE DEVICE: CPT | Performed by: OBSTETRICS & GYNECOLOGY

## 2020-12-07 PROCEDURE — 81025 URINE PREGNANCY TEST: CPT | Performed by: OBSTETRICS & GYNECOLOGY

## 2020-12-07 RX ORDER — COPPER 313.4 MG/1
1 INTRAUTERINE DEVICE INTRAUTERINE ONCE
COMMUNITY
End: 2023-01-02

## 2020-12-07 RX ORDER — COPPER 313.4 MG/1
1 INTRAUTERINE DEVICE INTRAUTERINE ONCE
Status: ACTIVE
Start: 2020-12-07

## 2020-12-07 ASSESSMENT — MIFFLIN-ST. JEOR: SCORE: 1498.9

## 2020-12-07 NOTE — PATIENT INSTRUCTIONS
What Paragard Users May Expect    What to watch for right after Paragard is placed  Some women may experience uterine cramps, bleeding, and/or dizziness during and right after Paragard is placed. To help minimize the cramps, you may taken ibuprofen 600 mg with food prior to your appointment. These symptoms should improve over the next 24 hours.  Mild cramping may be present for a few days after your placement  As a follow up, you should check your strings in 4 weeks, or visit your clinic once in the first 4 to 12 weeks after Paragard is placed to make sure it is in the right position. After that, Paragard can be checked once a year as part of your routine exam.    Please use a back-up method (abstinence or condoms) for 5 days after placement.    Your periods may change  Some women may have heavier and longer periods with cramping for a while; some may have spotting between periods. These side effects usually lessen after a few months. However, if your menstrual flow is severe or prolonged, call your healthcare professional. You should also call your healthcare professional if you miss a period.    Paragard Strings  You may check your own Paragard strings by inserting a finger into the vagina and feeling the strings as they exit the cervix.  The strings will initially feel firm, but will soften over a few weeks.  After the strings have softened, you or your partner should not be able to feel the strings during intercourse.  If you can feel the IUD, see your healthcare provider to have the position confirmed.  You may continue to use tampons with the IUD in place.    Paragard does not protect against HIV or STDs.  Paragard does not prevent the formation of ovarian cysts.  Paragard does not typically reduce acne or cause weight gain or mood changes.    Please call [facility] at [phone] if you have questions or concerns.    For more information:  [email or web address]      IUD  information:     Benefits: The IUD can be 97-99% effective when carefully following directions regarding use. It can be more effective if used with additional contraception. IUD containing progestin may decrease menstrual flow and menstrual cramping.     Risks/Side Effects: include but are not limited to spotting, bleeding, hemorrhage, or anemia: cramping or pain: partial or complete expulsion of device; lost IUD strings; uterine or cervical perforation; embedding of IUD in the uterine wall; increased risk of pelvic inflammatory disease. Women who become pregnant with an IUD in place are at a higher risk for ectopic pregnancy should a pregnancy occur with an IUD in situ. There is a higher rate of miscarriage when pregnancy occurs with IUD in place.    Warning signs: Please call clinic if you have abnormal spotting or heavy bleeding, abdominal pain, dyspareunia, fever, chills, flu like symptoms, or unable to locate strings of IUD, or strings are longer or shorter than expected.    You are encouraged to use condoms for prevention of STD. You may also need back up contraception for 7 days with your IUD. You may use pain medications (ibuprofen) as needed for mild to moderate pain. Please follow-up in clinic in 4-6 weeks for IUD string check if unable to find strings or as directed by provider.

## 2020-12-07 NOTE — PROGRESS NOTES
"  IUD Insertion:  CONSULT:    Is a pregnancy test required: Yes.  Was it positive or negative?  Negative  Was a consent obtained?  Yes    Subjective: Adrian Gomez is a 35 year old  presents for IUD and desires Paragard type IUD.    Patient has been given the opportunity to ask questions about all forms of birth control, including all options appropriate for Adrian Gomez. Discussed that no method of birth control, except abstinence is 100% effective against pregnancy or sexually transmitted infection.     Adrian Gomez understands she may have the IUD removed at any time. IUD should be removed by a health care provider.    The entire insertion procedure was reviewed with the patient, including care after placement.    Patient's last menstrual period was 2020. Has current contraception. No allergy to betadine or shellfish.   HCG Qual Urine   Date Value Ref Range Status   2020 Negative NEG^Negative Final     Comment:     This test is for screening purposes.  Results should be interpreted along with   the clinical picture.  Confirmation testing is available if warranted by   ordering KLA969, HCG Quantitative Pregnancy.           /70 (BP Location: Left arm, Patient Position: Sitting, Cuff Size: Adult Regular)   Pulse 69   Temp 99.2  F (37.3  C) (Oral)   Ht 1.71 m (5' 7.32\")   Wt 75.6 kg (166 lb 11.2 oz)   LMP 2020   Breastfeeding Yes   BMI 25.86 kg/m      Pelvic Exam:   EG/BUS: normal genital architecture without lesions, erythema or abnormal secretions.   Vagina: moist, pink, rugae with physiologic discharge and secretions  Cervix: parous no lesions and pink, moist, closed, without lesion or CMT  Uterus: mid position,  no pain  Adnexa: within normal limits and no masses, nodularity, tenderness    PROCEDURE NOTE: -- IUD Insertion    Reason for Insertion: contraception        Under sterile technique, cervix was visualized with speculum and prepped with Betadine solution swab x 3. " Tenaculum was placed for stability. The uterus was gently straightened and sounded to 7.0 cm. IUD prepared for placement, and IUD inserted according to 's instructions without difficulty or significant resitance, and deployed at the fundus. The strings were visualized and trimmed to 2.5 cm from the external os. Tenaculum was removed and hemostasis noted. Speculum removed.  Patient tolerated procedure well.    Lot # see nursing note       EBL: minimal    Complications: none    ASSESSMENT:     ICD-10-CM   1. Encounter for insertion of intrauterine contraceptive device  Z30.430           2. Encounter for IUD insertion  Z30.430   3. H. pylori infection  A04.8        PLAN:    Given 's handouts, including when to have IUD removed, list of danger s/sx, side effects and follow up recommended. Encouraged condom use for prevention of STD. Back up contraception advised for 7 days if progestin method. Advised to call for any fever, for prolonged or severe pain or bleeding, abnormal vaginal discharge, or unable to palpate strings. She was advised to use pain medications (ibuprofen) as needed for mild to moderate pain. Advised to follow-up in clinic in 4-6 weeks for IUD string check if unable to find strings or as directed by provider.     Catina Vgea MD

## 2020-12-07 NOTE — Clinical Note
On this chart I did the smart set for mirena but was supposed to be paragard so hopefully I fixed it.  I put in a paragard IUD.   Thanks.  RR

## 2020-12-09 ENCOUNTER — VIRTUAL VISIT (OUTPATIENT)
Dept: FAMILY MEDICINE | Facility: CLINIC | Age: 35
End: 2020-12-09
Payer: COMMERCIAL

## 2020-12-09 DIAGNOSIS — Z86.19 HISTORY OF HELICOBACTER PYLORI INFECTION: ICD-10-CM

## 2020-12-09 PROCEDURE — 99212 OFFICE O/P EST SF 10 MIN: CPT | Mod: TEL | Performed by: FAMILY MEDICINE

## 2020-12-09 NOTE — PROGRESS NOTES
"Adrian Gomez is a 35 year old female who is being evaluated via a billable telephone visit.      The patient has been notified of following:     \"This telephone visit will be conducted via a call between you and your physician/provider. We have found that certain health care needs can be provided without the need for a physical exam.  This service lets us provide the care you need with a short phone conversation.  If a prescription is necessary we can send it directly to your pharmacy.  If lab work is needed we can place an order for that and you can then stop by our lab to have the test done at a later time.    Telephone visits are billed at different rates depending on your insurance coverage. During this emergency period, for some insurers they may be billed the same as an in-person visit.  Please reach out to your insurance provider with any questions.    If during the course of the call the physician/provider feels a telephone visit is not appropriate, you will not be charged for this service.\"    Patient has given verbal consent for Telephone visit?  Yes    What phone number would you like to be contacted at? [phone]    How would you like to obtain your AVS? MyChart   9.40 AM-start visit -start visit    Subjective     Ardian Gomez is a 35 year old female who presents via phone visit today for the following health issues:    HPI      Medication Followup of Amoxicillin 500  For H Pylori-Took it for 5 days and had a Reaction-has allergy    2 years ago.    Medicines were Given by [facility] 2 years ago    Side Effects:  Reaction     Medication Helping Symptoms:  NO   Had  Hy pylori test 2 years ago and was Prescribed medicines-she got a rash  She did not complete treatment  Her GYN ordered another H pylori test  Pt has not done this yet  She has Problems with abdominal bloating and occasional Diarrhea  No abdominal pain  No pain  No GERD      Review of Systems   Rest of the ROS is negative       Objective    "       Vitals:  No vitals were obtained today due to virtual visit.    healthy, alert and no distress  PSYCH: Alert and oriented times 3; coherent speech, normal   rate and volume, able to articulate logical thoughts, able   to abstract reason, no tangential thoughts, no hallucinations   or delusions  Her affect is normal  RESP: No cough, no audible wheezing, able to talk in full sentences  Remainder of exam unable to be completed due to telephone visits    Pending labs        Assessment/Plan:    Assessment & Plan     History of Helicobacter pylori infection  Advised to do the test as recommended and follow up with MD  9:51 AM-viist complete     Return in about 1 month (around 1/9/2021) for Physical Exam.    Jessica Nunez MD  [facility]    Phone call duration:  11 minutes

## 2020-12-10 DIAGNOSIS — A04.8 H. PYLORI INFECTION: ICD-10-CM

## 2020-12-10 PROCEDURE — 87338 HPYLORI STOOL AG IA: CPT | Performed by: OBSTETRICS & GYNECOLOGY

## 2020-12-11 LAB — H PYLORI AG STL QL IA: NEGATIVE

## 2020-12-14 ENCOUNTER — HEALTH MAINTENANCE LETTER (OUTPATIENT)
Age: 35
End: 2020-12-14

## 2021-10-02 ENCOUNTER — HEALTH MAINTENANCE LETTER (OUTPATIENT)
Age: 36
End: 2021-10-02

## 2021-10-23 ENCOUNTER — HOSPITAL ENCOUNTER (EMERGENCY)
Facility: CLINIC | Age: 36
Discharge: HOME | End: 2021-10-23
Attending: EMERGENCY MEDICINE | Admitting: EMERGENCY MEDICINE
Payer: COMMERCIAL

## 2021-10-23 VITALS
HEART RATE: 88 BPM | OXYGEN SATURATION: 100 % | DIASTOLIC BLOOD PRESSURE: 65 MMHG | RESPIRATION RATE: 16 BRPM | SYSTOLIC BLOOD PRESSURE: 102 MMHG | TEMPERATURE: 98.2 F

## 2021-10-23 DIAGNOSIS — N39.0 URINARY TRACT INFECTION WITHOUT HEMATURIA, SITE UNSPECIFIED: ICD-10-CM

## 2021-10-23 DIAGNOSIS — R11.2 NAUSEA AND VOMITING, INTRACTABILITY OF VOMITING NOT SPECIFIED, UNSPECIFIED VOMITING TYPE: ICD-10-CM

## 2021-10-23 LAB
ALBUMIN UR-MCNC: 200 MG/DL
ANION GAP SERPL CALCULATED.3IONS-SCNC: 3 MMOL/L (ref 3–14)
APPEARANCE UR: ABNORMAL
BASOPHILS # BLD AUTO: 0 10E3/UL (ref 0–0.2)
BASOPHILS NFR BLD AUTO: 0 %
BILIRUB UR QL STRIP: ABNORMAL
BUN SERPL-MCNC: 10 MG/DL (ref 7–30)
CALCIUM SERPL-MCNC: 8.7 MG/DL (ref 8.5–10.1)
CHLORIDE BLD-SCNC: 104 MMOL/L (ref 94–109)
CO2 SERPL-SCNC: 29 MMOL/L (ref 20–32)
COLOR UR AUTO: ABNORMAL
CREAT SERPL-MCNC: 0.53 MG/DL (ref 0.52–1.04)
EOSINOPHIL # BLD AUTO: 0 10E3/UL (ref 0–0.7)
EOSINOPHIL NFR BLD AUTO: 0 %
ERYTHROCYTE [DISTWIDTH] IN BLOOD BY AUTOMATED COUNT: 12.7 % (ref 10–15)
GFR SERPL CREATININE-BSD FRML MDRD: >90 ML/MIN/1.73M2
GLUCOSE BLD-MCNC: 92 MG/DL (ref 70–99)
GLUCOSE UR STRIP-MCNC: NEGATIVE MG/DL
HCG UR QL: NEGATIVE
HCT VFR BLD AUTO: 38.6 % (ref 35–47)
HGB BLD-MCNC: 12.6 G/DL (ref 11.7–15.7)
HGB UR QL STRIP: ABNORMAL
IMM GRANULOCYTES # BLD: 0 10E3/UL
IMM GRANULOCYTES NFR BLD: 0 %
INTERNAL QC OK POCT: NORMAL
KETONES UR STRIP-MCNC: NEGATIVE MG/DL
LEUKOCYTE ESTERASE UR QL STRIP: ABNORMAL
LYMPHOCYTES # BLD AUTO: 0.9 10E3/UL (ref 0.8–5.3)
LYMPHOCYTES NFR BLD AUTO: 12 %
MCH RBC QN AUTO: 27.9 PG (ref 26.5–33)
MCHC RBC AUTO-ENTMCNC: 32.6 G/DL (ref 31.5–36.5)
MCV RBC AUTO: 85 FL (ref 78–100)
MONOCYTES # BLD AUTO: 0.6 10E3/UL (ref 0–1.3)
MONOCYTES NFR BLD AUTO: 9 %
MUCOUS THREADS #/AREA URNS LPF: PRESENT /LPF
NEUTROPHILS # BLD AUTO: 5.5 10E3/UL (ref 1.6–8.3)
NEUTROPHILS NFR BLD AUTO: 79 %
NITRATE UR QL: POSITIVE
NRBC # BLD AUTO: 0 10E3/UL
NRBC BLD AUTO-RTO: 0 /100
PH UR STRIP: 8.5 [PH] (ref 5–7)
PLATELET # BLD AUTO: 172 10E3/UL (ref 150–450)
POTASSIUM BLD-SCNC: 3.8 MMOL/L (ref 3.4–5.3)
RBC # BLD AUTO: 4.52 10E6/UL (ref 3.8–5.2)
RBC URINE: 154 /HPF
SODIUM SERPL-SCNC: 136 MMOL/L (ref 133–144)
SP GR UR STRIP: 1.01 (ref 1–1.03)
TRANSITIONAL EPI: <1 /HPF
UROBILINOGEN UR STRIP-MCNC: 2 MG/DL
WBC # BLD AUTO: 7 10E3/UL (ref 4–11)
WBC CLUMPS #/AREA URNS HPF: PRESENT /HPF
WBC URINE: >182 /HPF

## 2021-10-23 PROCEDURE — 99284 EMERGENCY DEPT VISIT MOD MDM: CPT | Mod: 25 | Performed by: EMERGENCY MEDICINE

## 2021-10-23 PROCEDURE — 81025 URINE PREGNANCY TEST: CPT | Performed by: EMERGENCY MEDICINE

## 2021-10-23 PROCEDURE — 96361 HYDRATE IV INFUSION ADD-ON: CPT | Performed by: EMERGENCY MEDICINE

## 2021-10-23 PROCEDURE — 85025 COMPLETE CBC W/AUTO DIFF WBC: CPT | Performed by: EMERGENCY MEDICINE

## 2021-10-23 PROCEDURE — 96374 THER/PROPH/DIAG INJ IV PUSH: CPT | Performed by: EMERGENCY MEDICINE

## 2021-10-23 PROCEDURE — 258N000003 HC RX IP 258 OP 636: Performed by: EMERGENCY MEDICINE

## 2021-10-23 PROCEDURE — 36415 COLL VENOUS BLD VENIPUNCTURE: CPT | Performed by: EMERGENCY MEDICINE

## 2021-10-23 PROCEDURE — 87086 URINE CULTURE/COLONY COUNT: CPT | Performed by: EMERGENCY MEDICINE

## 2021-10-23 PROCEDURE — 96375 TX/PRO/DX INJ NEW DRUG ADDON: CPT | Performed by: EMERGENCY MEDICINE

## 2021-10-23 PROCEDURE — 80048 BASIC METABOLIC PNL TOTAL CA: CPT | Performed by: EMERGENCY MEDICINE

## 2021-10-23 PROCEDURE — 81001 URINALYSIS AUTO W/SCOPE: CPT | Performed by: EMERGENCY MEDICINE

## 2021-10-23 PROCEDURE — 99284 EMERGENCY DEPT VISIT MOD MDM: CPT | Performed by: EMERGENCY MEDICINE

## 2021-10-23 PROCEDURE — 250N000011 HC RX IP 250 OP 636: Performed by: EMERGENCY MEDICINE

## 2021-10-23 RX ORDER — ONDANSETRON 2 MG/ML
4 INJECTION INTRAMUSCULAR; INTRAVENOUS EVERY 30 MIN PRN
Status: DISCONTINUED | OUTPATIENT
Start: 2021-10-23 | End: 2021-10-23 | Stop reason: HOSPADM

## 2021-10-23 RX ORDER — KETOROLAC TROMETHAMINE 30 MG/ML
30 INJECTION, SOLUTION INTRAMUSCULAR; INTRAVENOUS ONCE
Status: COMPLETED | OUTPATIENT
Start: 2021-10-23 | End: 2021-10-23

## 2021-10-23 RX ORDER — SODIUM CHLORIDE 9 MG/ML
INJECTION, SOLUTION INTRAVENOUS CONTINUOUS
Status: DISCONTINUED | OUTPATIENT
Start: 2021-10-23 | End: 2021-10-23 | Stop reason: HOSPADM

## 2021-10-23 RX ORDER — ONDANSETRON 4 MG/1
4 TABLET, ORALLY DISINTEGRATING ORAL EVERY 8 HOURS PRN
Qty: 20 TABLET | Refills: 1 | Status: SHIPPED | OUTPATIENT
Start: 2021-10-23 | End: 2021-10-26

## 2021-10-23 RX ADMIN — ONDANSETRON 4 MG: 2 INJECTION INTRAMUSCULAR; INTRAVENOUS at 18:14

## 2021-10-23 RX ADMIN — SODIUM CHLORIDE: 9 INJECTION, SOLUTION INTRAVENOUS at 19:01

## 2021-10-23 RX ADMIN — SODIUM CHLORIDE 1000 ML: 9 INJECTION, SOLUTION INTRAVENOUS at 18:14

## 2021-10-23 RX ADMIN — KETOROLAC TROMETHAMINE 30 MG: 30 INJECTION, SOLUTION INTRAMUSCULAR; INTRAVENOUS at 18:14

## 2021-10-23 ASSESSMENT — ENCOUNTER SYMPTOMS
NAUSEA: 1
FLANK PAIN: 1
FEVER: 0
DYSURIA: 1
VOMITING: 1
ABDOMINAL PAIN: 1

## 2021-10-23 NOTE — ED PROVIDER NOTES
ED Provider Note  [facility]      History     Chief Complaint   Patient presents with     Flank Pain     Onset today with right sided flank pain radiating to right abdomen, was at urgent care today and started on antibiotics, now vomiting     The history is provided by the patient, medical records and the spouse.     Adrian Gomez is a 36 year old female who was diagnosed with a UTI today at urgent care presenting to the ED now with vomiting and increased flank pain. Patient reports that symptoms began with dysuria and mild bilateral lower abdominal pain radiating to the bilateral flanks yesterday. She was seen in  today, diagnosed with a UTI, and prescribed Bactrim, Pyridium, and Diflucan. When she returned home her abdominal pain and flank pain significantly worsened and was associated with nausea. She took the prescribed medications and then vomited x2. She denies fever. She now continues to have nausea with increased bilateral lower abdominal pain radiating to the flanks, more so the right.     Past Medical History  Past Medical History:   Diagnosis Date     Personal history of tuberculosis 2004    Finished on medications for 1 year in 6/2004     Past Surgical History:   Procedure Laterality Date     None       ondansetron (ZOFRAN ODT) 4 MG ODT tab  acetaminophen (TYLENOL) 325 MG tablet  paragard intrauterine copper device  vitamin D3 (CHOLECALCIFEROL) 2000 units tablet      Allergies   Allergen Reactions     Amoxicillin      rash     Family History  Family History   Problem Relation Age of Onset     Family History Negative Mother      Breast Cancer Mother      Family History Negative Father      Heart Disease Maternal Grandmother      Heart Disease Maternal Grandfather      Heart Disease Paternal Grandmother      Heart Disease Paternal Grandfather      Diabetes No family hx of      Hypertension No family hx of      Social History   Social History     Tobacco Use     Smoking status:  Never Smoker     Smokeless tobacco: Never Used   Substance Use Topics     Alcohol use: Not Currently     Comment: 2 drinks a month, none since pregnant     Drug use: No      Past medical history, past surgical history, medications, allergies, family history, and social history were reviewed with the patient. No additional pertinent items.       Review of Systems   Constitutional: Negative for fever.   Gastrointestinal: Positive for abdominal pain (lower), nausea and vomiting.   Genitourinary: Positive for dysuria and flank pain.   All other systems reviewed and are negative.    A complete review of systems was performed with pertinent positives and negatives noted in the HPI, and all other systems negative.    Physical Exam   BP: 107/74  Pulse: 74  Temp: 98.2  F (36.8  C)  Resp: 16  SpO2: 100 %  Physical Exam  Vitals and nursing note reviewed.   Constitutional:       General: She is not in acute distress.     Appearance: She is well-developed. She is not diaphoretic.   HENT:      Head: Normocephalic.      Mouth/Throat:      Pharynx: No oropharyngeal exudate.   Eyes:      Extraocular Movements: Extraocular movements intact.   Cardiovascular:      Rate and Rhythm: Normal rate and regular rhythm.      Heart sounds: Normal heart sounds.   Pulmonary:      Effort: No respiratory distress.      Breath sounds: Normal breath sounds.   Abdominal:      General: There is no distension.      Palpations: Abdomen is soft.      Tenderness: There is no abdominal tenderness.   Musculoskeletal:         General: No deformity.      Cervical back: Neck supple.      Comments: Bilateral low back tenderness   Skin:     General: Skin is warm and dry.   Neurological:      Mental Status: She is alert.      Comments: alert   Psychiatric:         Behavior: Behavior normal.         ED Course     5:57 PM  The patient was seen and examined by Dr. Perry in Room ED2.    Procedures       Results for orders placed or performed during the hospital  encounter of 10/23/21   UA with Microscopic reflex to Culture     Status: Abnormal    Specimen: Urine, Clean Catch   Result Value Ref Range    Color Urine Dark Yellow (A) Colorless, Straw, Light Yellow, Yellow    Appearance Urine Slightly Cloudy (A) Clear    Glucose Urine Negative Negative mg/dL    Bilirubin Urine Small (A) Negative    Ketones Urine Negative Negative mg/dL    Specific Gravity Urine 1.013 1.003 - 1.035    Blood Urine Moderate (A) Negative    pH Urine 8.5 (H) 5.0 - 7.0    Protein Albumin Urine 200  (A) Negative mg/dL    Urobilinogen Urine 2.0 Normal, 2.0 mg/dL    Nitrite Urine Positive (A) Negative    Leukocyte Esterase Urine Large (A) Negative    WBC Clumps Urine Present (A) None Seen /HPF    Mucus Urine Present (A) None Seen /LPF    RBC Urine 154 (H) <=2 /HPF    WBC Urine >182 (H) <=5 /HPF    Transitional Epithelials Urine <1 <=1 /HPF    Narrative    Urine Culture ordered based on laboratory criteria   Basic metabolic panel     Status: Normal   Result Value Ref Range    Sodium 136 133 - 144 mmol/L    Potassium 3.8 3.4 - 5.3 mmol/L    Chloride 104 94 - 109 mmol/L    Carbon Dioxide (CO2) 29 20 - 32 mmol/L    Anion Gap 3 3 - 14 mmol/L    Urea Nitrogen 10 7 - 30 mg/dL    Creatinine 0.53 0.52 - 1.04 mg/dL    Calcium 8.7 8.5 - 10.1 mg/dL    Glucose 92 70 - 99 mg/dL    GFR Estimate >90 >60 mL/min/1.73m2   CBC with platelets and differential     Status: None   Result Value Ref Range    WBC Count 7.0 4.0 - 11.0 10e3/uL    RBC Count 4.52 3.80 - 5.20 10e6/uL    Hemoglobin 12.6 11.7 - 15.7 g/dL    Hematocrit 38.6 35.0 - 47.0 %    MCV 85 78 - 100 fL    MCH 27.9 26.5 - 33.0 pg    MCHC 32.6 31.5 - 36.5 g/dL    RDW 12.7 10.0 - 15.0 %    Platelet Count 172 150 - 450 10e3/uL    % Neutrophils 79 %    % Lymphocytes 12 %    % Monocytes 9 %    % Eosinophils 0 %    % Basophils 0 %    % Immature Granulocytes 0 %    NRBCs per 100 WBC 0 <1 /100    Absolute Neutrophils 5.5 1.6 - 8.3 10e3/uL    Absolute Lymphocytes 0.9 0.8 -  5.3 10e3/uL    Absolute Monocytes 0.6 0.0 - 1.3 10e3/uL    Absolute Eosinophils 0.0 0.0 - 0.7 10e3/uL    Absolute Basophils 0.0 0.0 - 0.2 10e3/uL    Absolute Immature Granulocytes 0.0 <=0.0 10e3/uL    Absolute NRBCs 0.0 10e3/uL   hCG qual urine POCT     Status: Normal   Result Value Ref Range    HCG Qual Urine Negative Negative    Internal QC Check POCT Valid Valid   CBC with platelets differential     Status: None    Narrative    The following orders were created for panel order CBC with platelets differential.  Procedure                               Abnormality         Status                     ---------                               -----------         ------                     CBC with platelets and d...[[id number]]                      Final result                 Please view results for these tests on the individual orders.          Results for orders placed or performed during the hospital encounter of 10/23/21   UA with Microscopic reflex to Culture     Status: Abnormal    Specimen: Urine, Clean Catch   Result Value Ref Range    Color Urine Dark Yellow (A) Colorless, Straw, Light Yellow, Yellow    Appearance Urine Slightly Cloudy (A) Clear    Glucose Urine Negative Negative mg/dL    Bilirubin Urine Small (A) Negative    Ketones Urine Negative Negative mg/dL    Specific Gravity Urine 1.013 1.003 - 1.035    Blood Urine Moderate (A) Negative    pH Urine 8.5 (H) 5.0 - 7.0    Protein Albumin Urine 200  (A) Negative mg/dL    Urobilinogen Urine 2.0 Normal, 2.0 mg/dL    Nitrite Urine Positive (A) Negative    Leukocyte Esterase Urine Large (A) Negative    WBC Clumps Urine Present (A) None Seen /HPF    Mucus Urine Present (A) None Seen /LPF    RBC Urine 154 (H) <=2 /HPF    WBC Urine >182 (H) <=5 /HPF    Transitional Epithelials Urine <1 <=1 /HPF    Narrative    Urine Culture ordered based on laboratory criteria   Basic metabolic panel     Status: Normal   Result Value Ref Range    Sodium 136 133 - 144 mmol/L     Potassium 3.8 3.4 - 5.3 mmol/L    Chloride 104 94 - 109 mmol/L    Carbon Dioxide (CO2) 29 20 - 32 mmol/L    Anion Gap 3 3 - 14 mmol/L    Urea Nitrogen 10 7 - 30 mg/dL    Creatinine 0.53 0.52 - 1.04 mg/dL    Calcium 8.7 8.5 - 10.1 mg/dL    Glucose 92 70 - 99 mg/dL    GFR Estimate >90 >60 mL/min/1.73m2   CBC with platelets and differential     Status: None   Result Value Ref Range    WBC Count 7.0 4.0 - 11.0 10e3/uL    RBC Count 4.52 3.80 - 5.20 10e6/uL    Hemoglobin 12.6 11.7 - 15.7 g/dL    Hematocrit 38.6 35.0 - 47.0 %    MCV 85 78 - 100 fL    MCH 27.9 26.5 - 33.0 pg    MCHC 32.6 31.5 - 36.5 g/dL    RDW 12.7 10.0 - 15.0 %    Platelet Count 172 150 - 450 10e3/uL    % Neutrophils 79 %    % Lymphocytes 12 %    % Monocytes 9 %    % Eosinophils 0 %    % Basophils 0 %    % Immature Granulocytes 0 %    NRBCs per 100 WBC 0 <1 /100    Absolute Neutrophils 5.5 1.6 - 8.3 10e3/uL    Absolute Lymphocytes 0.9 0.8 - 5.3 10e3/uL    Absolute Monocytes 0.6 0.0 - 1.3 10e3/uL    Absolute Eosinophils 0.0 0.0 - 0.7 10e3/uL    Absolute Basophils 0.0 0.0 - 0.2 10e3/uL    Absolute Immature Granulocytes 0.0 <=0.0 10e3/uL    Absolute NRBCs 0.0 10e3/uL   hCG qual urine POCT     Status: Normal   Result Value Ref Range    HCG Qual Urine Negative Negative    Internal QC Check POCT Valid Valid   CBC with platelets differential     Status: None    Narrative    The following orders were created for panel order CBC with platelets differential.  Procedure                               Abnormality         Status                     ---------                               -----------         ------                     CBC with platelets and d...[[id number]]                      Final result                 Please view results for these tests on the individual orders.     Medications   0.9% sodium chloride BOLUS (0 mLs Intravenous Stopped 10/23/21 1901)     Followed by   sodium chloride 0.9% infusion (0 mLs Intravenous Stopped 10/23/21 2100)   ondansetron  (ZOFRAN) injection 4 mg (4 mg Intravenous Given 10/23/21 1814)   ketorolac (TORADOL) injection 30 mg (30 mg Intravenous Given 10/23/21 1814)        Assessments & Plan (with Medical Decision Making)   36-year-old female with a urinary tract infection previously diagnosed today presents to us with severe nausea and vomiting.  She was given fluids in addition to pain and nausea control.  Her back pain and nausea improved greatly.  No signs today unremarkable.  Labs are unremarkable.  Urine is very consistent with her known UTI.  We will discharge her with Zofran and recommend follow-up with primary care.    I have reviewed the nursing notes. I have reviewed the findings, diagnosis, plan and need for follow up with the patient.    Discharge Medication List as of 10/23/2021  9:00 PM      START taking these medications    Details   ondansetron (ZOFRAN ODT) 4 MG ODT tab Take 1 tablet (4 mg) by mouth every 8 hours as needed for nausea, Disp-20 tablet, R-1, Local Print             Final diagnoses:   Nausea and vomiting, intractability of vomiting not specified, unspecified vomiting type   Urinary tract infection without hematuria, site unspecified   I, Eusebia Larios, am serving as a trained medical scribe to document services personally performed by Justin Perry DO, based on the provider's statements to me.     I, Justin Perry DO, was physically present and have reviewed and verified the accuracy of this note documented by Eusebia Larios.    --  Justin Perry DO  [facility] EMERGENCY DEPARTMENT  10/23/2021     Justin Perry DO  10/23/21 2114

## 2021-10-24 LAB — BACTERIA UR CULT: ABNORMAL

## 2021-10-24 NOTE — DISCHARGE INSTRUCTIONS
Follow-up with your primary care provider.  Take the antibiotic you were given at the urgent care.  Return to the emergency department as needed for any new or worsening symptoms.

## 2021-11-04 ENCOUNTER — OFFICE VISIT (OUTPATIENT)
Dept: FAMILY MEDICINE | Facility: CLINIC | Age: 36
End: 2021-11-04
Payer: COMMERCIAL

## 2021-11-04 VITALS
HEART RATE: 67 BPM | TEMPERATURE: 97.7 F | DIASTOLIC BLOOD PRESSURE: 69 MMHG | OXYGEN SATURATION: 99 % | SYSTOLIC BLOOD PRESSURE: 106 MMHG

## 2021-11-04 DIAGNOSIS — N39.0 URINARY TRACT INFECTION WITH HEMATURIA, SITE UNSPECIFIED: ICD-10-CM

## 2021-11-04 DIAGNOSIS — R10.9 FLANK PAIN: Primary | ICD-10-CM

## 2021-11-04 DIAGNOSIS — R31.9 URINARY TRACT INFECTION WITH HEMATURIA, SITE UNSPECIFIED: ICD-10-CM

## 2021-11-04 DIAGNOSIS — R10.84 ABDOMINAL PAIN, GENERALIZED: ICD-10-CM

## 2021-11-04 LAB
ALBUMIN UR-MCNC: NEGATIVE MG/DL
APPEARANCE UR: CLEAR
BACTERIA #/AREA URNS HPF: ABNORMAL /HPF
BILIRUB UR QL STRIP: NEGATIVE
COLOR UR AUTO: YELLOW
GLUCOSE UR STRIP-MCNC: NEGATIVE MG/DL
HGB UR QL STRIP: ABNORMAL
KETONES UR STRIP-MCNC: NEGATIVE MG/DL
LEUKOCYTE ESTERASE UR QL STRIP: NEGATIVE
NITRATE UR QL: NEGATIVE
PH UR STRIP: >=9 [PH] (ref 5–8)
RBC #/AREA URNS AUTO: ABNORMAL /HPF
SP GR UR STRIP: 1.02 (ref 1–1.03)
SQUAMOUS #/AREA URNS AUTO: ABNORMAL /LPF
UROBILINOGEN UR STRIP-ACNC: 0.2 E.U./DL
WBC #/AREA URNS AUTO: ABNORMAL /HPF

## 2021-11-04 PROCEDURE — 81001 URINALYSIS AUTO W/SCOPE: CPT | Performed by: FAMILY MEDICINE

## 2021-11-04 PROCEDURE — 99213 OFFICE O/P EST LOW 20 MIN: CPT | Performed by: FAMILY MEDICINE

## 2021-11-04 RX ORDER — IBUPROFEN 600 MG/1
600 TABLET, FILM COATED ORAL EVERY 8 HOURS PRN
Qty: 30 TABLET | Refills: 0 | Status: SHIPPED | OUTPATIENT
Start: 2021-11-04 | End: 2021-11-14

## 2021-11-04 NOTE — PROGRESS NOTES
Clinical Decision Making:    At the end of the encounter, I discussed results, diagnosis, medications. Discussed red flags for immediate return to clinic/ER, as well as indications for follow up if no improvement. Patient understood and agreed to plan. Patient was stable for discharge.      ICD-10-CM    1. Flank pain  R10.9 ibuprofen (ADVIL/MOTRIN) 600 MG tablet   2. Abdominal pain, generalized  R10.84 UA with Microscopic reflex to Culture - Clinic Collect     Urine Microscopic   3. Urinary tract infection  N39.0 UA with Microscopic reflex to Culture - Clinic Collect     Urine Microscopic     Plenty of fluids  Ibuprofen 600mg three times per day with food  To ER if severe flank pain develops        There are no Patient Instructions on file for this visit.   No follow-ups on file.      chief complaint    HPI:  Adrian Gomez is a 36 year old female who presents today complaining of bilateral flank pain today.  Nuys hematuria, urinary frequency or urgency.  No dysuria.  No vaginal itching discharge or odor.  No concern about sexually transmitted infections  LMP 2 weeks ago  This is how her last UTI started and her symptoms progressed really quickly last time that is why she has come in so quickly.  No CT scan done last time when she was having the flank pain.     she was seen in urgent care and diagnosed with UTI.  She is allergic to amoxicillin and was treated with Bactrim.  Later that day she developed vomiting and back pain and was seen in the emergency room.  She was given IV fluids and Zofran in the emergency room and sent home to continue on the Bactrim treatment for the UTI.  The urine culture did come back sensitive to Bactrim.    Family history of kidney stones in her grandfather  History obtained from chart review and the patient.    Problem List:  2020: History of Helicobacter pylori infection  2019: Pregnancy  2019:  (normal spontaneous vaginal delivery)  2019: Encounter for triage  in pregnant patient  2019: Enlarged lymph nodes in armpit  2018: Supervision of normal pregnancy  2018: Dermatitis, seborrheic  2017: Acute cystitis without hematuria  2015: Encounter for triage in pregnant patient  2015: Encounter for supervision of normal pregnancy  2015:  (normal spontaneous vaginal delivery)  2015: Normal first pregnancy confirmed, currently in third   trimester  2015: Group B streptococcal infection during pregnancy  2015: Supervision of normal first pregnancy  2011: CARDIOVASCULAR SCREENING; LDL GOAL LESS THAN 160  2011: Personal history of tuberculosis  Tuberculosis      Past Medical History:   Diagnosis Date     Personal history of tuberculosis     Finished on medications for 1 year in 2004       Social History     Tobacco Use     Smoking status: Never Smoker     Smokeless tobacco: Never Used   Substance Use Topics     Alcohol use: Not Currently     Comment: 2 drinks a month, none since pregnant       Review of systems  negative except listed in HPI    Vitals:    21 1339   BP: 106/69   BP Location: Right arm   Patient Position: Sitting   Cuff Size: Adult Regular   Pulse: 67   Temp: 97.7  F (36.5  C)   TempSrc: Oral   SpO2: 99%       Physical Exam  Vitals noted and within normal limits.  In general patient is alert, oriented and in no acute distress.  Back with no CVA tenderness.  Abdomen soft, non-tender and not distended.  UA with trace blood  Micro: 5-10 red cells per high-power field, no white cells

## 2021-11-04 NOTE — PATIENT INSTRUCTIONS
Plenty of fluids  Ibuprofen 600mg three times per day with food  To ER if severe flank pain develops

## 2021-11-11 NOTE — PROGRESS NOTES
SUBJECTIVE:   CC: Adrian Gomez is an 36 year old woman who presents for preventive health visit.       Patient has been advised of split billing requirements and indicates understanding: Yes  Healthy Habits:     Getting at least 3 servings of Calcium per day:  Yes    Bi-annual eye exam:  NO    Dental care twice a year:  Yes    Sleep apnea or symptoms of sleep apnea:  None    Diet:  Vegetarian/vegan    Frequency of exercise:  1 day/week    Duration of exercise:  Less than 15 minutes    Taking medications regularly:  Yes    Medication side effects:  None    PHQ-2 Total Score: 0    Additional concerns today:  No  Today's PHQ-2 Score:   PHQ-2 ( 1999 [employer]) 11/12/2021   Q1: Little interest or pleasure in doing things 0   Q2: Feeling down, depressed or hopeless 0   PHQ-2 Score 0   Q1: Little interest or pleasure in doing things Not at all   Q2: Feeling down, depressed or hopeless Not at all   PHQ-2 Score 0       Abuse: Current or Past (Physical, Sexual or Emotional) - No  Do you feel safe in your environment? Yes    Have you ever done Advance Care Planning? (For example, a Health Directive, POLST, or a discussion with a medical provider or your loved ones about your wishes): No, advance care planning information given to patient to review.  Patient declined advance care planning discussion at this time.    Social History     Tobacco Use     Smoking status: Never Smoker     Smokeless tobacco: Never Used   Substance Use Topics     Alcohol use: Never     Comment: 2 drinks a month, none since pregnant     If you drink alcohol do you typically have >3 drinks per day or >7 drinks per week? No    Alcohol Use 11/12/2021   Prescreen: >3 drinks/day or >7 drinks/week? No   Prescreen: >3 drinks/day or >7 drinks/week? -   No flowsheet data found.    Reviewed orders with patient.  Reviewed health maintenance and updated orders accordingly - Yes  Lab work is in process  Labs reviewed in EPIC  BP Readings from Last 3 Encounters:    21 121/73   21 106/69   10/23/21 102/65    Wt Readings from Last 3 Encounters:   21 75.3 kg (166 lb)   20 75.6 kg (166 lb 11.2 oz)   20 72.9 kg (160 lb 12.8 oz)                  Patient Active Problem List   Diagnosis     CARDIOVASCULAR SCREENING; LDL GOAL LESS THAN 160     Personal history of tuberculosis     Acute cystitis without hematuria     Dermatitis, seborrheic     Enlarged lymph nodes in armpit      (normal spontaneous vaginal delivery)     History of Helicobacter pylori infection     Encounter for insertion of ParaGard IUD     Past Surgical History:   Procedure Laterality Date     None         Social History     Tobacco Use     Smoking status: Never Smoker     Smokeless tobacco: Never Used   Substance Use Topics     Alcohol use: Never     Comment: 2 drinks a month, none since pregnant     Family History   Problem Relation Age of Onset     Family History Negative Mother      Family History Negative Father      Heart Disease Maternal Grandmother      Heart Disease Maternal Grandfather      Heart Disease Paternal Grandmother      Heart Disease Paternal Grandfather      Diabetes No family hx of      Hypertension No family hx of          Current Outpatient Medications   Medication Sig Dispense Refill     acetaminophen (TYLENOL) 325 MG tablet Take 2 tablets (650 mg) by mouth every 6 hours as needed for mild pain Start after Delivery. 100 tablet 0     ibuprofen (ADVIL/MOTRIN) 600 MG tablet Take 1 tablet (600 mg) by mouth every 8 hours as needed for moderate pain 30 tablet 0     paragard intrauterine copper device 1 each by Intrauterine route once       vitamin D3 (CHOLECALCIFEROL) 2000 units tablet Take 1 tablet by mouth daily       Allergies   Allergen Reactions     Amoxicillin      rash       Breast Cancer Screening:  Any new diagnosis of family breast, ovarian, or bowel cancer? No    FHS-7: No flowsheet data found.    Patient under 40 years of age: Routine Mammogram  Screening not recommended.   Pertinent mammograms are reviewed under the imaging tab.    History of abnormal Pap smear: NO - age 30-65 PAP every 5 years with negative HPV co-testing recommended  PAP / HPV Latest Ref Rng & Units 12/11/2018 5/12/2015 3/26/2013   PAP (Historical) - NIL NIL NIL   HPV16 NEG:Negative Negative - -   HPV18 NEG:Negative Negative - -   HRHPV NEG:Negative Negative - -     Reviewed and updated as needed this visit by clinical staff  Tobacco  Allergies  Meds  Problems  Med Hx  Surg Hx  Fam Hx         Reviewed and updated as needed this visit by Provider  Tobacco  Allergies  Meds  Problems  Med Hx  Surg Hx  Fam Hx        Past Medical History:   Diagnosis Date     Personal history of tuberculosis 2004    Finished on medications for 1 year in 6/2004      Past Surgical History:   Procedure Laterality Date     None         Review of Systems   Constitutional: Negative for chills and fever.   HENT: Negative for congestion, ear pain, hearing loss and sore throat.    Eyes: Negative for pain and visual disturbance.   Respiratory: Negative for cough and shortness of breath.    Cardiovascular: Negative for chest pain, palpitations and peripheral edema.   Gastrointestinal: Negative for abdominal pain, constipation, diarrhea, heartburn, hematochezia and nausea.   Breasts:  Negative for tenderness, breast mass and discharge.   Genitourinary: Negative for dysuria, frequency, genital sores, hematuria, pelvic pain, urgency, vaginal bleeding and vaginal discharge.   Musculoskeletal: Negative for arthralgias, joint swelling and myalgias.   Skin: Negative for rash.   Neurological: Negative for dizziness, weakness, headaches and paresthesias.   Psychiatric/Behavioral: Negative for mood changes. The patient is not nervous/anxious.      CONSTITUTIONAL: NEGATIVE for fever, chills, change in weight  INTEGUMENTARU/SKIN: NEGATIVE for worrisome rashes, moles or lesions  EYES: NEGATIVE for vision changes or  "irritation  ENT: NEGATIVE for ear, mouth and throat problems  RESP: NEGATIVE for significant cough or SOB  BREAST: NEGATIVE for masses, tenderness or discharge  CV: NEGATIVE for chest pain, palpitations or peripheral edema  GI: NEGATIVE for nausea, abdominal pain, heartburn, or change in bowel habits  : NEGATIVE for unusual urinary or vaginal symptoms. Periods are regular.  MUSCULOSKELETAL: NEGATIVE for significant arthralgias or myalgia  NEURO: NEGATIVE for weakness, dizziness or paresthesias  PSYCHIATRIC: NEGATIVE for changes in mood or affect     OBJECTIVE:   /73   Pulse 81   Temp 98.9  F (37.2  C) (Oral)   Resp 16   Ht 1.71 m (5' 7.32\")   Wt 75.3 kg (166 lb)   SpO2 99%   BMI 25.75 kg/m    Physical Exam  GENERAL: healthy, alert and no distress  EYES: Eyes grossly normal to inspection, PERRL and conjunctivae and sclerae normal  HENT: ear canals and TM's normal, nose and mouth without ulcers or lesions  NECK: no adenopathy, no asymmetry, masses, or scars and thyroid normal to palpation  RESP: lungs clear to auscultation - no rales, rhonchi or wheezes  BREAST: normal without masses, tenderness or nipple discharge and no palpable axillary masses or adenopathy  CV: regular rate and rhythm, normal S1 S2, no S3 or S4, no murmur, click or rub, no peripheral edema and peripheral pulses strong  ABDOMEN: soft, nontender, no hepatosplenomegaly, no masses and bowel sounds normal   (female): normal female external genitalia, normal urethral meatus , vaginal mucosa pink, moist, well rugated and normal cervix, adnexae, and uterus without masses  IUD in place  MS: no gross musculoskeletal defects noted, no edema  SKIN: no suspicious lesions or rashes  NEURO: Normal strength and tone, mentation intact and speech normal  PSYCH: mentation appears normal, affect normal/bright    Diagnostic Test Results:  Labs reviewed in Epic    ASSESSMENT/PLAN:   (Z00.01) Encounter for routine adult physical exam with abnormal " "findings  (primary encounter diagnosis)  Comment: pending   Plan: Hepatitis C Screen Reflex to HCV RNA Quant and         Genotype, Lipid panel reflex to direct LDL         Non-fasting, Glucose            (Z86.11) Personal history of tuberculosis  Comment: as a child      (Z30.430) Encounter for insertion of ParaGard IUD  Comment: IUD in place  Plan:     (Z23) High priority for 2019-nCoV vaccine-booster   Comment: advised   Plan: COVID-19,PF,PFIZER (12+ Yrs PURPLE LABEL)        Advised flu shot      Patient has been advised of split billing requirements and indicates understanding: Yes  COUNSELING:  Reviewed preventive health counseling, as reflected in patient instructions       Regular exercise       Healthy diet/nutrition       Folic Acid       Safe sex practices/STD prevention    Estimated body mass index is 25.75 kg/m  as calculated from the following:    Height as of this encounter: 1.71 m (5' 7.32\").    Weight as of this encounter: 75.3 kg (166 lb).    Weight management plan: Discussed healthy diet and exercise guidelines    She reports that she has never smoked. She has never used smokeless tobacco.      Counseling Resources:  ATP IV Guidelines  Pooled Cohorts Equation Calculator  Breast Cancer Risk Calculator  BRCA-Related Cancer Risk Assessment: FHS-7 Tool  FRAX Risk Assessment  ICSI Preventive Guidelines  Dietary Guidelines for Americans, 2010  [employer]'s MyPlate  ASA Prophylaxis  Lung CA Screening    Jessica Nunez MD  [facility]  "

## 2021-11-12 ENCOUNTER — OFFICE VISIT (OUTPATIENT)
Dept: URGENT CARE | Facility: URGENT CARE | Age: 36
End: 2021-11-12
Payer: COMMERCIAL

## 2021-11-12 ENCOUNTER — OFFICE VISIT (OUTPATIENT)
Dept: FAMILY MEDICINE | Facility: CLINIC | Age: 36
End: 2021-11-12
Payer: COMMERCIAL

## 2021-11-12 VITALS
SYSTOLIC BLOOD PRESSURE: 121 MMHG | OXYGEN SATURATION: 99 % | BODY MASS INDEX: 26.06 KG/M2 | HEIGHT: 67 IN | WEIGHT: 166 LBS | HEART RATE: 81 BPM | DIASTOLIC BLOOD PRESSURE: 73 MMHG | RESPIRATION RATE: 16 BRPM | TEMPERATURE: 98.9 F

## 2021-11-12 VITALS
WEIGHT: 166 LBS | SYSTOLIC BLOOD PRESSURE: 122 MMHG | HEART RATE: 67 BPM | TEMPERATURE: 97.9 F | BODY MASS INDEX: 25.75 KG/M2 | OXYGEN SATURATION: 97 % | DIASTOLIC BLOOD PRESSURE: 73 MMHG

## 2021-11-12 DIAGNOSIS — Z23 HIGH PRIORITY FOR 2019-NCOV VACCINE: ICD-10-CM

## 2021-11-12 DIAGNOSIS — Z86.11 PERSONAL HISTORY OF TUBERCULOSIS: ICD-10-CM

## 2021-11-12 DIAGNOSIS — Z00.01 ENCOUNTER FOR ROUTINE ADULT PHYSICAL EXAM WITH ABNORMAL FINDINGS: Primary | ICD-10-CM

## 2021-11-12 DIAGNOSIS — Z23 NEED FOR PROPHYLACTIC VACCINATION AND INOCULATION AGAINST INFLUENZA: ICD-10-CM

## 2021-11-12 DIAGNOSIS — R30.0 DYSURIA: ICD-10-CM

## 2021-11-12 DIAGNOSIS — Z30.430 ENCOUNTER FOR INSERTION OF PARAGARD IUD: ICD-10-CM

## 2021-11-12 DIAGNOSIS — N10 PYELONEPHRITIS, ACUTE: ICD-10-CM

## 2021-11-12 DIAGNOSIS — R10.9 RIGHT FLANK PAIN: ICD-10-CM

## 2021-11-12 DIAGNOSIS — N12 PYELONEPHRITIS OF RIGHT KIDNEY: Primary | ICD-10-CM

## 2021-11-12 LAB
ALBUMIN UR-MCNC: NEGATIVE MG/DL
APPEARANCE UR: CLEAR
BACTERIA #/AREA URNS HPF: ABNORMAL /HPF
BILIRUB UR QL STRIP: NEGATIVE
CHOLEST SERPL-MCNC: 127 MG/DL
COLOR UR AUTO: YELLOW
FASTING STATUS PATIENT QL REPORTED: NO
FASTING STATUS PATIENT QL REPORTED: NO
GLUCOSE BLD-MCNC: 85 MG/DL (ref 70–99)
GLUCOSE UR STRIP-MCNC: NEGATIVE MG/DL
HDLC SERPL-MCNC: 40 MG/DL
HGB UR QL STRIP: ABNORMAL
KETONES UR STRIP-MCNC: NEGATIVE MG/DL
LDLC SERPL CALC-MCNC: 75 MG/DL
LEUKOCYTE ESTERASE UR QL STRIP: ABNORMAL
NITRATE UR QL: NEGATIVE
NONHDLC SERPL-MCNC: 87 MG/DL
PH UR STRIP: 6.5 [PH] (ref 5–7)
RBC #/AREA URNS AUTO: ABNORMAL /HPF
SP GR UR STRIP: 1.01 (ref 1–1.03)
SQUAMOUS #/AREA URNS AUTO: ABNORMAL /LPF
TRIGL SERPL-MCNC: 59 MG/DL
UROBILINOGEN UR STRIP-ACNC: 0.2 E.U./DL
WBC #/AREA URNS AUTO: ABNORMAL /HPF

## 2021-11-12 PROCEDURE — 87186 SC STD MICRODIL/AGAR DIL: CPT | Performed by: INTERNAL MEDICINE

## 2021-11-12 PROCEDURE — 99214 OFFICE O/P EST MOD 30 MIN: CPT | Mod: 25 | Performed by: INTERNAL MEDICINE

## 2021-11-12 PROCEDURE — 82947 ASSAY GLUCOSE BLOOD QUANT: CPT | Performed by: FAMILY MEDICINE

## 2021-11-12 PROCEDURE — 96372 THER/PROPH/DIAG INJ SC/IM: CPT | Performed by: INTERNAL MEDICINE

## 2021-11-12 PROCEDURE — 80061 LIPID PANEL: CPT | Performed by: FAMILY MEDICINE

## 2021-11-12 PROCEDURE — 86803 HEPATITIS C AB TEST: CPT | Performed by: FAMILY MEDICINE

## 2021-11-12 PROCEDURE — 81001 URINALYSIS AUTO W/SCOPE: CPT | Performed by: INTERNAL MEDICINE

## 2021-11-12 PROCEDURE — 87086 URINE CULTURE/COLONY COUNT: CPT | Performed by: INTERNAL MEDICINE

## 2021-11-12 PROCEDURE — 36415 COLL VENOUS BLD VENIPUNCTURE: CPT | Performed by: FAMILY MEDICINE

## 2021-11-12 PROCEDURE — 90686 IIV4 VACC NO PRSV 0.5 ML IM: CPT | Performed by: FAMILY MEDICINE

## 2021-11-12 PROCEDURE — 90471 IMMUNIZATION ADMIN: CPT | Performed by: FAMILY MEDICINE

## 2021-11-12 PROCEDURE — 0004A COVID-19,PF,PFIZER (12+ YRS): CPT | Performed by: FAMILY MEDICINE

## 2021-11-12 PROCEDURE — 99395 PREV VISIT EST AGE 18-39: CPT | Mod: 25 | Performed by: FAMILY MEDICINE

## 2021-11-12 PROCEDURE — 91300 COVID-19,PF,PFIZER (12+ YRS): CPT | Performed by: FAMILY MEDICINE

## 2021-11-12 RX ORDER — CEFTRIAXONE SODIUM 1 G
1 VIAL (EA) INJECTION ONCE
Status: COMPLETED | OUTPATIENT
Start: 2021-11-12 | End: 2021-11-12

## 2021-11-12 RX ORDER — CIPROFLOXACIN 500 MG/1
500 TABLET, FILM COATED ORAL 2 TIMES DAILY
Qty: 20 TABLET | Refills: 0 | Status: SHIPPED | OUTPATIENT
Start: 2021-11-12 | End: 2021-11-22

## 2021-11-12 RX ADMIN — Medication 1 G: at 20:22

## 2021-11-12 ASSESSMENT — ENCOUNTER SYMPTOMS
BREAST MASS: 0
DYSURIA: 1
PARESTHESIAS: 0
BACK PAIN: 1
HEMATOCHEZIA: 0
DIFFICULTY URINATING: 0
MYALGIAS: 0
ARTHRALGIAS: 0
COUGH: 0
SHORTNESS OF BREATH: 0
DIZZINESS: 0
SORE THROAT: 0
FREQUENCY: 0
HEMATURIA: 0
NAUSEA: 0
CHILLS: 0
FEVER: 0
HEARTBURN: 0
HEADACHES: 0
FREQUENCY: 0
ABDOMINAL PAIN: 0
DYSURIA: 0
PALPITATIONS: 0
NERVOUS/ANXIOUS: 0
HEMATURIA: 0
DIARRHEA: 0
EYE PAIN: 0
DYSURIA: 0
JOINT SWELLING: 0
WEAKNESS: 0
CONSTIPATION: 0

## 2021-11-12 ASSESSMENT — MIFFLIN-ST. JEOR: SCORE: 1490.72

## 2021-11-13 NOTE — PROGRESS NOTES
ASSESSMENT AND PLAN:      ICD-10-CM    1. Pyelonephritis of right kidney  N12 cefTRIAXone (ROCEPHIN) injection 1 g     ciprofloxacin (CIPRO) 500 MG tablet     Adult Urology Referral   2. Dysuria  R30.0 UA Macro with Reflex to Micro and Culture - lab collect     UA Macro with Reflex to Micro and Culture - lab collect     Urine Microscopic Exam     Urine Culture   3. Pyelonephritis, acute  N10    4. Right flank pain  R10.9      *PLAN:      Consider .Urology appointment if recurring    Patient Instructions   Ceftriaxone antibiotics shot given    cipro 500 mg 2 x day for 10 days    urine culture done    To ER is symptoms not improved as would need IV antibiotics         Patient Education     Kidney Infection (Adult Female)     An infection in one or both kidneys is called pyelonephritis. It usually happens when bacteria ) get into the kidney. Rarely it is caused when other germs such as viruses, fungi, or other disease-causing organisms get into the kidney. The bacteria or other disease-causing organisms can enter the kidneys from the bladder or blood traveling from other parts of the body. A kidney infection can become serious. It can cause severe illness, scarring of the kidneys, or kidney failure if not treated correctly.  Common causes for this problem include:    Not keeping the genital area clean and dry, which promotes the growth of bacteria    Wiping back to front. This drags bacteria from the rectum toward the urinary opening (urethra).    Wearing tight pants or underwear. This lets moisture build up in the genital area, which helps bacteria grow.    Holding urine in for long periods of time    Dehydration    Urinary tract infections    Blockages of urine draining from the kidney, such as a kidney stone  Kidney infections can cause symptoms similar to a bladder infection. Symptoms include:    Pain (or burning) when urinating    Having to urinate more often than usual    Blood in the urine (pink or red)    Belly  (abdominal) pain or discomfort, usually in the lower abdomen    Pain in the side or back    Pain above the pubic bone    Fever or chills    Vomiting    Loss of appetite  Treatment is oral antibiotics. More severe cases are treated with intramuscular or IV (intravenous) antibiotics. These are started right away and may be changed once urine culture results show the infecting organisms. Treatment helps prevent a more serious kidney infection. Symptoms of kidney infections can vary based on your age.  Medicines  Medicines can help in the treatment of a bladder infection:    Take antibiotics exactly as prescribed and until they are used up, even if you feel better. It's important to finish them to make sure the infection is gone.    Unless another medicine was prescribed, you can use over-the-counter medicines for pain, fever, or discomfort. If you have chronic liver of kidney disease, talk with your healthcare provider before using these medicines. Also talk with your provider if you've ever had a stomach ulcer or digestive bleeding, or are taking blood thinners.  Home care  The following are general care guidelines:    Stay home from work or school. Rest in bed until your fever breaks and you are feeling better, or as advised by your healthcare provider.    Drink lots of fluid unless you must restrict fluids for other medical reasons. This will force the medicine into your urinary system and flush the bacteria out of your body. Ask your healthcare provider how much you should drink.    Don't have sex until you have finished all of your medicine and your symptoms are gone.    Don't have caffeine, alcohol, or spicy foods. These foods may irritate the kidneys and bladder.    Don't take bubble baths. Sensitivity to the chemicals in bubble baths can irritate the urethra.    Make sure you wipe from front to back after using the toilet.    Wear loose cloths and cotton underwear.  Prevention  These self-care steps can help  prevent future infections:    Drink plenty of fluids to prevent dehydration and flush out the bladder. Do this unless you must restrict fluids for other health reasons, or your healthcare provider told you not to.    Correct cleaning after going to the bathroom in important. Make sure you wipe from front to back after using the toilet.    Urinate more often. Don't try to hold urine in for a long time.    Don't wear tight-fitting pants and underwear.    Improve your diet to prevent constipation. Eat more fruits, vegetables, and fiber. Eat less junk and fatty foods. Constipation can make a urinary tract infection more likely. Talk with your healthcare provider if you have trouble with bowel movements.    Urinate right after sex to flush out the bladder.  Follow-up care  Follow up with your healthcare provider, or as advised. Additional testing may be needed to make sure the infection has cleared. Close follow-up and further testing is very important to find the cause and to prevent future infections.  If a urine culture was done, you will be contacted if your treatment needs to be changed. If directed, you may call to find out the results.  If you had an X-ray, CT scan, or other diagnostic test, you will be notified of any new findings that may affect your care.  Call 911  Call 911 if any of the following occur:    Trouble breathing    Fainting or loss of consciousness    Rapid or very slow heart rate    Weakness, dizziness, or fainting    Trouble arousing or confusion  When to seek medical advice  Call your healthcare provider right away if any of these occur:    Fever 100.4 F (38 C) or higher, or as directed by your healthcare provider    Not feeling better or symptoms get worse within 1 to 2 days after starting antibiotics    Any symptom that continues after 3 days of treatment    Increasing pain in the stomach, back, side, or groin area    Repeated vomiting    Not able to take prescribed medicine due to nausea or  another reason    Bloody, dark-colored, or foul smelling urine    Trouble urinating or decreased urine output    No urine for 8 hours, no tears when crying, confusion, sunken eyes, or dry mouth  Fighters last reviewed this educational content on 11/1/2019 2000-2021 [employer]. All rights reserved. This information is not intended as a substitute for professional medical care. Always follow your healthcare professional's instructions.             Return in about 3 days (around 11/15/2021), or if symptoms worsen or fail to improve.        Melany Wilkerson MD  [facility]    Subjective     Adrian Gomez is a 36 year old who presents for Patient presents with:  Urgent Care  UTI: c/o dysuria for 1 day    an established patient of [facility].    UTI    Onset of symptoms was 1day(s).  Current and associated symptoms kidney pain  Some abdominal discomfort  Treatment and measures tried None  Predisposing factors include history of frequent UTI's  Just finished course of antibiotics within this past month for urinary tract infection.      Checked urine herself as  & saw WBC clumps     2 Result Notes    Culture 50,000-100,000 CFU/mL Escherichia coli Abnormal             Resulting Agency: [facility]       Susceptibility     Escherichia coli     JUNIOR     Ampicillin <=2.0 ug/mL Susceptible     Ampicillin/ Sulbactam <=2.0 ug/mL Susceptible     Cefazolin <=4.0 ug/mL Susceptible 1     Cefepime <=1.0 ug/mL Susceptible     Cefoxitin <=4.0 ug/mL Susceptible     Ceftazidime <=1.0 ug/mL Susceptible     Ceftriaxone <=1.0 ug/mL Susceptible     Ciprofloxacin <=0.25 ug/mL Susceptible     Gentamicin <=1.0 ug/mL Susceptible     Levofloxacin <=0.12 ug/mL Susceptible     Nitrofurantoin <=16.0 ug/mL Susceptible     Piperacillin/Tazobactam <=4.0 ug/mL Susceptible     Tobramycin <=1.0 ug/mL Susceptible     Trimethoprim/Sulfamethoxazole <=1/19 ug/mL Susceptible                           Review of Systems    Genitourinary: Negative for difficulty urinating, dysuria, frequency and hematuria.   Musculoskeletal: Positive for back pain (kidney pain).           Objective    /73   Pulse 67   Temp 97.9  F (36.6  C) (Tympanic)   Wt 75.3 kg (166 lb)   SpO2 97%   BMI 25.75 kg/m    Physical Exam  Vitals reviewed.   Constitutional:       Appearance: Normal appearance. She is not ill-appearing or toxic-appearing.   Abdominal:      Palpations: Abdomen is soft.      Tenderness: There is no abdominal tenderness. There is right CVA tenderness.      Comments: R>L CVA pain   Neurological:      Mental Status: She is alert.        Component      Latest Ref Rng & Units 11/12/2021   Color Urine      Colorless, Straw, Light Yellow, Yellow Yellow   Appearance Urine      Clear Clear   Glucose Urine      Negative mg/dL Negative   Bilirubin Urine      Negative Negative   Ketones Urine      Negative mg/dL Negative   Specific Gravity Urine      1.003 - 1.035 1.015   Blood Urine      Negative Moderate (A)   pH Urine      5.0 - 7.0 6.5   Protein Albumin Urine      Negative mg/dL Negative   Urobilinogen Urine      0.2, 1.0 E.U./dL 0.2   Nitrite Urine      Negative Negative   Leukocyte Esterase Urine      Negative Moderate (A)   Bacteria Urine      None Seen /HPF Few (A)   RBC Urine      0-2 /HPF /HPF 0-2   WBC Urine      0-5 /HPF /HPF 10-25 (A)   Squamous Epithelial /LPF Urine      None Seen /LPF Few (A)     Patient  and noticed white cell blood clumps on urinalysis

## 2021-11-13 NOTE — PATIENT INSTRUCTIONS
Ceftriaxone antibiotics shot given    cipro 500 mg 2 x day for 10 days    urine culture done    To ER is symptoms not improved as would need IV antibiotics         Patient Education     Kidney Infection (Adult Female)     An infection in one or both kidneys is called pyelonephritis. It usually happens when bacteria ) get into the kidney. Rarely it is caused when other germs such as viruses, fungi, or other disease-causing organisms get into the kidney. The bacteria or other disease-causing organisms can enter the kidneys from the bladder or blood traveling from other parts of the body. A kidney infection can become serious. It can cause severe illness, scarring of the kidneys, or kidney failure if not treated correctly.  Common causes for this problem include:    Not keeping the genital area clean and dry, which promotes the growth of bacteria    Wiping back to front. This drags bacteria from the rectum toward the urinary opening (urethra).    Wearing tight pants or underwear. This lets moisture build up in the genital area, which helps bacteria grow.    Holding urine in for long periods of time    Dehydration    Urinary tract infections    Blockages of urine draining from the kidney, such as a kidney stone  Kidney infections can cause symptoms similar to a bladder infection. Symptoms include:    Pain (or burning) when urinating    Having to urinate more often than usual    Blood in the urine (pink or red)    Belly (abdominal) pain or discomfort, usually in the lower abdomen    Pain in the side or back    Pain above the pubic bone    Fever or chills    Vomiting    Loss of appetite  Treatment is oral antibiotics. More severe cases are treated with intramuscular or IV (intravenous) antibiotics. These are started right away and may be changed once urine culture results show the infecting organisms. Treatment helps prevent a more serious kidney infection. Symptoms of kidney infections can vary based on your  age.  Medicines  Medicines can help in the treatment of a bladder infection:    Take antibiotics exactly as prescribed and until they are used up, even if you feel better. It's important to finish them to make sure the infection is gone.    Unless another medicine was prescribed, you can use over-the-counter medicines for pain, fever, or discomfort. If you have chronic liver of kidney disease, talk with your healthcare provider before using these medicines. Also talk with your provider if you've ever had a stomach ulcer or digestive bleeding, or are taking blood thinners.  Home care  The following are general care guidelines:    Stay home from work or school. Rest in bed until your fever breaks and you are feeling better, or as advised by your healthcare provider.    Drink lots of fluid unless you must restrict fluids for other medical reasons. This will force the medicine into your urinary system and flush the bacteria out of your body. Ask your healthcare provider how much you should drink.    Don't have sex until you have finished all of your medicine and your symptoms are gone.    Don't have caffeine, alcohol, or spicy foods. These foods may irritate the kidneys and bladder.    Don't take bubble baths. Sensitivity to the chemicals in bubble baths can irritate the urethra.    Make sure you wipe from front to back after using the toilet.    Wear loose cloths and cotton underwear.  Prevention  These self-care steps can help prevent future infections:    Drink plenty of fluids to prevent dehydration and flush out the bladder. Do this unless you must restrict fluids for other health reasons, or your healthcare provider told you not to.    Correct cleaning after going to the bathroom in important. Make sure you wipe from front to back after using the toilet.    Urinate more often. Don't try to hold urine in for a long time.    Don't wear tight-fitting pants and underwear.    Improve your diet to prevent constipation.  Eat more fruits, vegetables, and fiber. Eat less junk and fatty foods. Constipation can make a urinary tract infection more likely. Talk with your healthcare provider if you have trouble with bowel movements.    Urinate right after sex to flush out the bladder.  Follow-up care  Follow up with your healthcare provider, or as advised. Additional testing may be needed to make sure the infection has cleared. Close follow-up and further testing is very important to find the cause and to prevent future infections.  If a urine culture was done, you will be contacted if your treatment needs to be changed. If directed, you may call to find out the results.  If you had an X-ray, CT scan, or other diagnostic test, you will be notified of any new findings that may affect your care.  Call 911  Call 911 if any of the following occur:    Trouble breathing    Fainting or loss of consciousness    Rapid or very slow heart rate    Weakness, dizziness, or fainting    Trouble arousing or confusion  When to seek medical advice  Call your healthcare provider right away if any of these occur:    Fever 100.4 F (38 C) or higher, or as directed by your healthcare provider    Not feeling better or symptoms get worse within 1 to 2 days after starting antibiotics    Any symptom that continues after 3 days of treatment    Increasing pain in the stomach, back, side, or groin area    Repeated vomiting    Not able to take prescribed medicine due to nausea or another reason    Bloody, dark-colored, or foul smelling urine    Trouble urinating or decreased urine output    No urine for 8 hours, no tears when crying, confusion, sunken eyes, or dry mouth  [employer] last reviewed this educational content on 11/1/2019 2000-2021 [employer]. All rights reserved. This information is not intended as a substitute for professional medical care. Always follow your healthcare professional's instructions.

## 2021-11-14 LAB
BACTERIA UR CULT: ABNORMAL
HCV AB SERPL QL IA: NONREACTIVE

## 2021-11-16 ENCOUNTER — HOSPITAL ENCOUNTER (EMERGENCY)
Facility: CLINIC | Age: 36
Discharge: HOME | End: 2021-11-16
Payer: COMMERCIAL

## 2021-11-16 ENCOUNTER — TELEPHONE (OUTPATIENT)
Dept: FAMILY MEDICINE | Facility: CLINIC | Age: 36
End: 2021-11-16
Payer: COMMERCIAL

## 2021-11-16 NOTE — TELEPHONE ENCOUNTER
Khushi RN's and Dr. Nunez,    Patient calling regarding [employer] message and recent UC visit. Please see these encounters for detail. Per pt has been taking abx per UC and started last Fri. Pain has not gotten worse but also has not improved. Per note in UC visit pt to go to ER if symptoms have not improved as she may need IV abx. I told pt she should go to ER but that I can also forward to her clinic for further advise.     Thanks,  BERTHA Mayorga  [facility]

## 2021-11-17 NOTE — TELEPHONE ENCOUNTER
Pt read [employer] message sent by provider:   Last read by Adrain Gomez at 7:38 PM on 11/16/2021.      Went to ER, but left without being seen.     Left message on answering machine for patient to call back to the nurse at [phone].    Marissa José RN  [facility]

## 2021-11-20 ENCOUNTER — NURSE TRIAGE (OUTPATIENT)
Dept: NURSING | Facility: CLINIC | Age: 36
End: 2021-11-20
Payer: COMMERCIAL

## 2021-11-20 DIAGNOSIS — N39.0 URINARY TRACT INFECTION: Primary | ICD-10-CM

## 2021-11-20 RX ORDER — NITROFURANTOIN MACROCRYSTAL 100 MG
100 CAPSULE ORAL 2 TIMES DAILY
Qty: 14 CAPSULE | Refills: 0 | Status: SHIPPED | OUTPATIENT
Start: 2021-11-20 | End: 2021-11-27

## 2021-11-20 NOTE — TELEPHONE ENCOUNTER
Triage call    Patient called to report that she is having symptoms  Of flank and back pain.  Rate pain a 3 out of 10 she has 3 more doses ciprofloxacin 500 mg and she will be done with the 10 day course.  She had gone to the ED for IV antibiotics but after waiting 4 hours she went home.    Per protocol  See HCP with in 3 hours or second level triage.  Paged Dr Schulz He ordered nitrofurantoin 100 mg capsules  BID for 7 days.  She should go to the ED if she starts vomiting and the pain gets worse.  She also should call clinic for a appointment on Monday if no improvement.    Tenisha Herrera RN   [facility] Nurse Advisor  4:18 PM 11/20/2021    COVID 19 Nurse Triage Plan/Patient Instructions    Please be aware that novel coronavirus (COVID-19) may be circulating in the community. If you develop symptoms such as fever, cough, or SOB or if you have concerns about the presence of another infection including coronavirus (COVID-19), please contact your health care provider or visit [email or web address].     Disposition/Instructions    Home care recommended. Follow home care protocol based instructions.    Thank you for taking steps to prevent the spread of this virus.  o Limit your contact with others.  o Wear a simple mask to cover your cough.  o Wash your hands well and often.    Resources    [facility]: About COVID-19: [email or web address]    CDC: What to Do If You're Sick: www.cdc.gov/coronavirus/2019-ncov/about/steps-when-sick.html    CDC: Ending Home Isolation: www.cdc.gov/coronavirus/2019-ncov/hcp/disposition-in-home-patients.html     CDC: Caring for Someone: www.cdc.gov/coronavirus/2019-ncov/if-you-are-sick/care-for-someone.html     [facility]: Interim Guidance for Hospital Discharge to Home: www.health.[facility].[place]/diseases/coronavirus/hcp/hospdischarge.pdf    [facility] clinical trials (COVID-19 research studies): [email or web address]/n-clinical-trials     Below  are the COVID-19 hotlines at the [place] [facility] ([facility]). Interpreters are available.   o For health questions: Call 332-136-3985 or 1-849.644.9064 (7 a.m. to 7 p.m.)  o For questions about schools and childcare: Call 398-657-7138 or 1-391.648.4955 (7 a.m. to 7 p.m.)    Reason for Disposition    [1] Taking antibiotic > 24 hours for UTI AND [2] flank or lower back pain worsening    Additional Information    Negative: Shock suspected (e.g., cold/pale/clammy skin, too weak to stand, low BP, rapid pulse)    Negative: Sounds like a life-threatening emergency to the triager    Negative: Urinary tract infection suspected, but not taking antibiotics    Negative: [1] Unable to urinate (or only a few drops) > 4 hours AND     [2] bladder feels very full (e.g., palpable bladder or strong urge to urinate)    Negative: Passing pure blood or large blood clots (i.e., size > a dime)  (Exceptions: flecks, small strands, or pinkish-red color)    Negative: Fever > 103 F (39.4 C)    Negative: Patient sounds very sick or weak to the triager    Negative: [1] Side (flank) or lower back pain AND [2] new onset since starting antibiotics    Negative: [1] Fever > 100.0 F (37.8 C) AND [2] new onset since starting antibiotics    Negative: [1] SEVERE pain (e.g., excruciating) AND [2] no improvement 2 hours after pain medications    Protocols used: URINARY TRACT INFECTION ON ANTIBIOTIC FOLLOW-UP CALL - FEMALE-DARELL

## 2021-12-01 NOTE — TELEPHONE ENCOUNTER
See 11/20/21 TE for related concern follow up.     Wendi Blake, RN, BSN, PHN  [facility]: [place]

## 2021-12-03 ENCOUNTER — NURSE TRIAGE (OUTPATIENT)
Dept: FAMILY MEDICINE | Facility: CLINIC | Age: 36
End: 2021-12-03
Payer: COMMERCIAL

## 2021-12-03 NOTE — TELEPHONE ENCOUNTER
"Per protocol, Advised patient to seek urgent care within a [facility] clinic/ advised for worsening symptoms seek emergency care.Patient stated understanding.    Reason for Disposition    Constant abdominal pain lasting > 2 hours    Additional Information    Negative: Passed out (i.e., fainted, collapsed and was not responding)    Negative: Shock suspected (e.g., cold/pale/clammy skin, too weak to stand, low BP, rapid pulse)    Negative: Sounds like a life-threatening emergency to the triager    Negative: Chest pain    Negative: Pain is mainly in upper abdomen (if needed ask: 'is it mainly above the belly button?')    Negative: Abdominal pain and pregnant > 20 weeks    Negative: Abdominal pain and pregnant < 20 weeks    Negative: SEVERE abdominal pain (e.g., excruciating)    Negative: Vomiting red blood or black (coffee ground) material    Negative: Bloody, black, or tarry bowel movements (Exception: chronic-unchanged black-grey bowel movements and is taking iron pills or Pepto-bismol)    Answer Assessment - Initial Assessment Questions  1. LOCATION: \"Where does it hurt?\"       Right side abdominal pain / more of side and not abdomen    2. RADIATION: \"Does the pain shoot anywhere else?\" (e.g., chest, back)      Back pain intermittently    3. ONSET: \"When did the pain begin?\" (e.g., minutes, hours or days ago)       Approximately 1 day ago    4. SUDDEN: \"Gradual or sudden onset?\"      Suddenly - recently had kidney infection just completed 2nd antibiotic treatment    5. PATTERN \"Does the pain come and go, or is it constant?\"     - If constant: \"Is it getting better, staying the same, or worsening?\"       (Note: Constant means the pain never goes away completely; most serious pain is constant and it progresses)      - If intermittent: \"How long does it last?\" \"Do you have pain now?\"      (Note: Intermittent means the pain goes away completely between bouts)      Constant    6. SEVERITY: \"How bad is the pain?\"  (e.g., " "Scale 1-10; mild, moderate, or severe)    - MILD (1-3): doesn't interfere with normal activities, abdomen soft and not tender to touch     - MODERATE (4-7): interferes with normal activities or awakens from sleep, tender to touch     - SEVERE (8-10): excruciating pain, doubled over, unable to do any normal activities       5-6/10    7. RECURRENT SYMPTOM: \"Have you ever had this type of abdominal pain before?\" If so, ask: \"When was the last time?\" and \"What happened that time?\"       Kidney infection    8. CAUSE: \"What do you think is causing the abdominal pain?\"      Unknown    9. RELIEVING/AGGRAVATING FACTORS: \"What makes it better or worse?\" (e.g., movement, antacids, bowel movement)      Moving makes it worse, sleeping on stomach makes it worse    10. OTHER SYMPTOMS: \"Has there been any vomiting, diarrhea, constipation, or urine problems?\"        No    11. PREGNANCY: \"Is there any chance you are pregnant?\" \"When was your last menstrual period?\"        No    Protocols used: ABDOMINAL PAIN - FEMALE-A-OH    Lexie Cote RN    "

## 2022-09-03 ENCOUNTER — HEALTH MAINTENANCE LETTER (OUTPATIENT)
Age: 37
End: 2022-09-03

## 2022-12-03 ENCOUNTER — E-VISIT (OUTPATIENT)
Dept: URGENT CARE | Facility: CLINIC | Age: 37
End: 2022-12-03
Payer: COMMERCIAL

## 2022-12-03 DIAGNOSIS — R30.0 DIFFICULT OR PAINFUL URINATION: Primary | ICD-10-CM

## 2022-12-03 PROCEDURE — 99421 OL DIG E/M SVC 5-10 MIN: CPT | Performed by: PHYSICIAN ASSISTANT

## 2022-12-03 NOTE — PATIENT INSTRUCTIONS
Dear Adrian Gomez,     After reviewing your responses, I would like you to come in for a urine test to make sure we treat you correctly. This urine test is to evaluate you for a possible urinary tract infection, and should be scheduled for today or tomorrow. Schedule a Lab Only appointment here.     Lab appointments are not available at most locations on the weekends. If no Lab Only appointment is available, you should be seen in any of our convenient Walk-in or Urgent Care Centers, which can be found on our website here.     You will receive instructions with your results in [employer] [facility] once they are available.     If your symptoms worsen, you develop pain in your back or stomach, develop fevers, or are not improving in 5 days, please contact your primary care provider for an appointment or visit a Walk-in or Urgent Care Center to be seen.     Thanks again for choosing us as your health care partner,     Rimma Bentley PA-C    Urinary Tract Infections in Women  Urinary tract infections (UTIs) are most often caused by bacteria. These bacteria enter the urinary tract. The bacteria may come from inside the body. Or they may travel from the skin outside the rectum or vagina into the urethra. Female anatomy makes it easy for bacteria from the bowel to enter a woman s urinary tract, which is the most common source of UTI. This means women develop UTIs more often than men. Pain in or around the urinary tract is a common UTI symptom. But the only way to know for sure if you have a UTI for the healthcare provider to test your urine. The two tests that may be done are the urinalysis and urine culture.     Types of UTIs    Cystitis. A bladder infection (cystitis) is the most common UTI in women. You may have urgent or frequent need to pee. You may also have pain, burning when you pee, and bloody urine.    Urethritis. This is an inflamed urethra, which is the tube that carries urine from the bladder to outside the body. You  may have lower stomach or back pain. You may also have urgent or frequent need to pee.    Pyelonephritis. This is a kidney infection. If not treated, it can be serious and damage your kidneys. In severe cases, you may need to stay in the hospital. You may have a fever and lower back pain.    Medicines to treat a UTI  Most UTIs are treated with antibiotics. These kill the bacteria. The length of time you need to take them depends on the type of infection. It may be as short as 3 days. If you have repeated UTIs, you may need a low-dose antibiotic for several months. Take antibiotics exactly as directed. Don t stop taking them until all of the medicine is gone. If you stop taking the antibiotic too soon, the infection may not go away. You may also develop a resistance to the antibiotic. This can make it much harder to treat.   Lifestyle changes to treat and prevent UTIs   The lifestyle changes below will help get rid of your UTI. They may also help prevent future UTIs.     Drink plenty of fluids. This includes water, juice, or other caffeine-free drinks. Fluids help flush bacteria out of your body.    Empty your bladder. Always empty your bladder when you feel the urge to pee. And always pee before going to sleep. Urine that stays in your bladder can lead to infection. Try to pee before and after sex as well.    Practice good personal hygiene. Wipe yourself from front to back after using the toilet. This helps keep bacteria from getting into the urethra.    Use condoms during sex. These help prevent UTIs caused by sexually transmitted bacteria. Also don't use spermicides during sex. These can increase the risk for UTIs. Choose other forms of birth control instead. For women who tend to get UTIs after sex, a low-dose of a preventive antibiotic may be used. Be sure to discuss this option with your healthcare provider.    Follow up with your healthcare provider as directed. He or she may test to make sure the infection  has cleared. If needed, more treatment may be started.  [employer] last reviewed this educational content on 7/1/2019 2000-2021 [employer]. All rights reserved. This information is not intended as a substitute for professional medical care. Always follow your healthcare professional's instructions.

## 2022-12-05 ENCOUNTER — OFFICE VISIT (OUTPATIENT)
Dept: FAMILY MEDICINE | Facility: CLINIC | Age: 37
End: 2022-12-05
Payer: COMMERCIAL

## 2022-12-05 VITALS
TEMPERATURE: 98.8 F | OXYGEN SATURATION: 98 % | RESPIRATION RATE: 18 BRPM | HEART RATE: 68 BPM | SYSTOLIC BLOOD PRESSURE: 115 MMHG | BODY MASS INDEX: 27.07 KG/M2 | WEIGHT: 174.5 LBS | DIASTOLIC BLOOD PRESSURE: 78 MMHG

## 2022-12-05 DIAGNOSIS — N12 PYELONEPHRITIS: Primary | ICD-10-CM

## 2022-12-05 DIAGNOSIS — R30.0 DYSURIA: ICD-10-CM

## 2022-12-05 LAB
ALBUMIN UR-MCNC: 100 MG/DL
APPEARANCE UR: ABNORMAL
BACTERIA #/AREA URNS HPF: ABNORMAL /HPF
BILIRUB UR QL STRIP: NEGATIVE
COLOR UR AUTO: YELLOW
GLUCOSE UR STRIP-MCNC: NEGATIVE MG/DL
HGB UR QL STRIP: ABNORMAL
KETONES UR STRIP-MCNC: NEGATIVE MG/DL
LEUKOCYTE ESTERASE UR QL STRIP: ABNORMAL
NITRATE UR QL: POSITIVE
PH UR STRIP: 7 [PH] (ref 5–8)
RBC #/AREA URNS AUTO: ABNORMAL /HPF
SP GR UR STRIP: 1.01 (ref 1–1.03)
SQUAMOUS #/AREA URNS AUTO: ABNORMAL /LPF
UROBILINOGEN UR STRIP-ACNC: 0.2 E.U./DL
WBC #/AREA URNS AUTO: ABNORMAL /HPF
WBC CLUMPS #/AREA URNS HPF: PRESENT /HPF

## 2022-12-05 PROCEDURE — 87086 URINE CULTURE/COLONY COUNT: CPT | Performed by: NURSE PRACTITIONER

## 2022-12-05 PROCEDURE — 99213 OFFICE O/P EST LOW 20 MIN: CPT | Performed by: NURSE PRACTITIONER

## 2022-12-05 PROCEDURE — 87186 SC STD MICRODIL/AGAR DIL: CPT | Performed by: NURSE PRACTITIONER

## 2022-12-05 PROCEDURE — 81001 URINALYSIS AUTO W/SCOPE: CPT

## 2022-12-05 RX ORDER — CIPROFLOXACIN 500 MG/1
500 TABLET, FILM COATED ORAL 2 TIMES DAILY
Qty: 14 TABLET | Refills: 0 | Status: SHIPPED | OUTPATIENT
Start: 2022-12-05 | End: 2022-12-12

## 2022-12-05 NOTE — PATIENT INSTRUCTIONS
Tylenol or ibuprofen as needed.    Cipro antibiotic for kidney infection.    There is a rare risk of tendon rupture with cipro: Stop ciprofloxacin if you notice new pain around a joint and discuss with primary care provider.    Recheck if you are not starting to feel better after 2 full days of treatment, sooner with fevers or severe worsening of symptoms.

## 2022-12-05 NOTE — PROGRESS NOTES
Assessment & Plan     Dysuria    - UA macro with reflex to Microscopic and Culture - Clinc Collect  - Urine Microscopic Exam  - Urine Culture    Pyelonephritis    - ciprofloxacin (CIPRO) 500 MG tablet  Dispense: 14 tablet; Refill: 0     Patient with CVA tenderness bilaterally starting last night preceded by 3 to 4 days of urinary symptoms.  He UA is consistent with infection.    Push fluids.  Return in 3 days if no better, sooner if worsening.    Tylenol or ibuprofen as needed.          Return in about 3 days (around 12/8/2022) for If no better.    [facility] LINA Campbell is a 37 year old female who presents to clinic today for the following health issues:  Chief Complaint   Patient presents with     Urinary Problem     X 12/01. Burning. On and off abdominal pain on both side x yesterday. No vaginal discharge or itch. Some frequency.     HPI    Urinary symptoms starting 4 days ago.  History of pyelonephritis in the past.  Says she believes she went to another systems urgent care and cannot remember where about 4-5 months ago and was given antibiotics outpatient and then she needed to go to the ED eventually for worsening pain.  No charting pertaining to this is available.      Symptoms Include:   Dysuria      Flank pain yesterday started on the left side and now more so on the right, but is persistent on both.  No fevers.  Normally has a little bit of back pain with her period, but she says this feels like previous pyelonephritis.      Period started 3  Days ago.      History includes: Denies breastfeeding and chance of pregnancy.             Review of Systems  See HPI.      Objective    /78 (BP Location: Right arm, Patient Position: Sitting, Cuff Size: Adult Regular)   Pulse 68   Temp 98.8  F (37.1  C) (Oral)   Resp 18   Wt 79.2 kg (174 lb 8 oz)   SpO2 98%   BMI 27.07 kg/m    Physical Exam  Constitutional:       Appearance: Normal appearance.    Pulmonary:      Effort: Pulmonary effort is normal.   Abdominal:      Palpations: Abdomen is soft.      Tenderness: There is abdominal tenderness. There is right CVA tenderness and left CVA tenderness. There is no guarding.   Neurological:      Mental Status: She is alert.   Psychiatric:         Mood and Affect: Mood normal.            Results for orders placed or performed in visit on 12/05/22 (from the past 24 hour(s))   UA macro with reflex to Microscopic and Culture - Clinc Collect    Specimen: Urine, Clean Catch   Result Value Ref Range    Color Urine Yellow Colorless, Straw, Light Yellow, Yellow    Appearance Urine Cloudy (A) Clear    Glucose Urine Negative Negative mg/dL    Bilirubin Urine Negative Negative    Ketones Urine Negative Negative mg/dL    Specific Gravity Urine 1.015 1.005 - 1.030    Blood Urine Moderate (A) Negative    pH Urine 7.0 5.0 - 8.0    Protein Albumin Urine 100 (A) Negative mg/dL    Urobilinogen Urine 0.2 0.2, 1.0 E.U./dL    Nitrite Urine Positive (A) Negative    Leukocyte Esterase Urine Large (A) Negative   Urine Microscopic Exam   Result Value Ref Range    Bacteria Urine Many (A) None Seen /HPF    RBC Urine 10-25 (A) 0-2 /HPF /HPF    WBC Urine 25-50 (A) 0-5 /HPF /HPF    Squamous Epithelials Urine Few (A) None Seen /LPF    WBC Clumps Urine Present (A) None Seen /HPF

## 2022-12-08 ENCOUNTER — TELEPHONE (OUTPATIENT)
Dept: FAMILY MEDICINE | Facility: CLINIC | Age: 37
End: 2022-12-08

## 2022-12-08 DIAGNOSIS — N30.00 ACUTE CYSTITIS WITHOUT HEMATURIA: Primary | ICD-10-CM

## 2022-12-08 LAB — BACTERIA UR CULT: ABNORMAL

## 2022-12-08 RX ORDER — NITROFURANTOIN 25; 75 MG/1; MG/1
100 CAPSULE ORAL 2 TIMES DAILY
Qty: 14 CAPSULE | Refills: 0 | Status: SHIPPED | OUTPATIENT
Start: 2022-12-08 | End: 2022-12-15

## 2022-12-08 NOTE — TELEPHONE ENCOUNTER
----- Message from Natalie Epstein MD sent at 12/8/2022 10:05 AM CST -----  Please call patient to make sure she received her [employer] message.  See message below.

## 2022-12-08 NOTE — TELEPHONE ENCOUNTER
Called patient. Patient states she has seen the message and has picked up medication at pharmacy. Patient asked if the Macrobid will cover her yeast infection    Rafi Dave MA on 12/8/2022 at 4:10 PM

## 2022-12-09 ENCOUNTER — TELEPHONE (OUTPATIENT)
Dept: FAMILY MEDICINE | Facility: CLINIC | Age: 37
End: 2022-12-09

## 2022-12-09 NOTE — TELEPHONE ENCOUNTER
----- Message from Natalie Epstein MD sent at 12/8/2022 10:05 AM CST -----  Please call patient to make sure she received her [employer] message.  See message below.

## 2022-12-09 NOTE — TELEPHONE ENCOUNTER
Called patient, no answer. Left voicemail providing call back number. If no call back, please call patient again to relay message below.    Roberto Murillo MA on 12/09/2022 at 3:02 PM

## 2022-12-10 NOTE — TELEPHONE ENCOUNTER
Called and informed patient regarding rx. Patient understood and will stop cipro and start macrobid. No questions.    Roberto Murillo MA on 12/10/2022 at 9:47 AM

## 2022-12-17 ENCOUNTER — MYC MEDICAL ADVICE (OUTPATIENT)
Dept: FAMILY MEDICINE | Facility: CLINIC | Age: 37
End: 2022-12-17

## 2022-12-17 ENCOUNTER — NURSE TRIAGE (OUTPATIENT)
Dept: NURSING | Facility: CLINIC | Age: 37
End: 2022-12-17

## 2022-12-17 ENCOUNTER — MYC REFILL (OUTPATIENT)
Dept: FAMILY MEDICINE | Facility: CLINIC | Age: 37
End: 2022-12-17

## 2022-12-17 ENCOUNTER — OFFICE VISIT (OUTPATIENT)
Dept: FAMILY MEDICINE | Facility: CLINIC | Age: 37
End: 2022-12-17
Payer: COMMERCIAL

## 2022-12-17 VITALS
OXYGEN SATURATION: 99 % | WEIGHT: 171.9 LBS | HEART RATE: 87 BPM | BODY MASS INDEX: 26.67 KG/M2 | DIASTOLIC BLOOD PRESSURE: 72 MMHG | SYSTOLIC BLOOD PRESSURE: 118 MMHG | RESPIRATION RATE: 18 BRPM | TEMPERATURE: 98 F

## 2022-12-17 DIAGNOSIS — Z97.5 INTRAUTERINE DEVICE: ICD-10-CM

## 2022-12-17 DIAGNOSIS — R30.0 DYSURIA: Primary | ICD-10-CM

## 2022-12-17 DIAGNOSIS — N30.00 ACUTE CYSTITIS WITHOUT HEMATURIA: ICD-10-CM

## 2022-12-17 DIAGNOSIS — N39.0 RECURRENT UTI: Primary | ICD-10-CM

## 2022-12-17 DIAGNOSIS — N39.0 URINARY TRACT INFECTION WITHOUT HEMATURIA, SITE UNSPECIFIED: ICD-10-CM

## 2022-12-17 LAB
ALBUMIN UR-MCNC: NEGATIVE MG/DL
APPEARANCE UR: CLEAR
BILIRUB UR QL STRIP: NEGATIVE
COLOR UR AUTO: YELLOW
GLUCOSE UR STRIP-MCNC: 100 MG/DL
HGB UR QL STRIP: NEGATIVE
KETONES UR STRIP-MCNC: NEGATIVE MG/DL
LEUKOCYTE ESTERASE UR QL STRIP: NEGATIVE
NITRATE UR QL: NEGATIVE
PH UR STRIP: 5.5 [PH] (ref 5–8)
SP GR UR STRIP: 1.02 (ref 1–1.03)
UROBILINOGEN UR STRIP-ACNC: 0.2 E.U./DL

## 2022-12-17 PROCEDURE — 81003 URINALYSIS AUTO W/O SCOPE: CPT

## 2022-12-17 PROCEDURE — 99213 OFFICE O/P EST LOW 20 MIN: CPT | Performed by: FAMILY MEDICINE

## 2022-12-17 RX ORDER — NITROFURANTOIN 25; 75 MG/1; MG/1
100 CAPSULE ORAL 2 TIMES DAILY
Qty: 20 CAPSULE | Refills: 0 | Status: SHIPPED | OUTPATIENT
Start: 2022-12-17 | End: 2022-12-27

## 2022-12-17 RX ORDER — NITROFURANTOIN 25; 75 MG/1; MG/1
100 CAPSULE ORAL 2 TIMES DAILY
Qty: 14 CAPSULE | Refills: 0 | Status: CANCELLED | OUTPATIENT
Start: 2022-12-17

## 2022-12-17 RX ORDER — NITROFURANTOIN 25; 75 MG/1; MG/1
100 CAPSULE ORAL 2 TIMES DAILY
COMMUNITY
End: 2023-01-02

## 2022-12-17 NOTE — PATIENT INSTRUCTIONS
Take the antibiotics    Stay well hydrated    Advise daily cranberry tablet    Follow up with primary regarding IUD etc     Be seen promptly if symptoms acutely worsen

## 2022-12-17 NOTE — PROGRESS NOTES
Adrian Gomez is a 37 year old female who comes in today for uti    Was seen on 8th    Took the nitrofurantoin for 7 days, finished yesterday after morning dose     Was feeling better         This am when woke up had     Pain on urination    Not much pain when not urinating    In past has progressed quickly    No fever/ chills    Get back pain with period    Just finished period    IUD for two years     Two bad bladder infections since IUD    Full physical not done     Mentation and affect are fine    No tremor of speech or extremity    No costovertebral angle tenderness    abd soft nontender    We looked at prior ua/ uc x 2 and also today's ua    ASSESSMENT / PLAN:  (R30.0) Dysuria  (primary encounter diagnosis)  Comment: recent urine culture showed fair amount of resistance so she was changed to nitrofurantoin.  Done with that but symptoms returning so prudent to go with another course of this antibiotic.  Discussed in detail.   Plan: UA macro with reflex to Microscopic and Culture        - Clinc Collect             (N39.0) Urinary tract infection without hematuria, site unspecified  Comment: as above.  Also increase fluid intake and start daily cranberry pill as preventive.   Plan: nitroFURantoin macrocrystal-monohydrate         (MACROBID) 100 MG capsule             (Z97.5) Intrauterine device  Comment: patient wonders if the IUD could be possible source for uti.  This is possible.    Plan: patient will follow up with primary regarding IUD etc.           I reviewed the patient's medications, allergies, medical history, family history, and social history.    Darryl Juarez MD

## 2022-12-17 NOTE — TELEPHONE ENCOUNTER
Pt is phoning stating that she has had a UTI  2 times this month     Pt states that her culture came back and she was switched to a different antibiotic which pt completed yesterday and is now having symptoms again     Pt is having burning with urination and frequency     No fever     Per disposition: See PCP Within 24 Hours    Care advice given per protocol and when to call back. Pt verbalized understanding and agrees to plan of care.    Ratna Tinoco RN  [place] Nurse Advisor  9:13 AM 12/17/2022                Reason for Disposition    Urinating more frequently than usual (i.e., frequency)    Additional Information    Negative: Shock suspected (e.g., cold/pale/clammy skin, too weak to stand, low BP, rapid pulse)    Negative: Sounds like a life-threatening emergency to the triager    Negative: Followed a female genital area injury (e.g., vagina, vulva)    Negative: Followed a male genital area injury (e.g., penis, scrotum)    Negative: Vaginal discharge    Negative: Pus (white, yellow) or bloody discharge from end of penis    Negative: [1] Taking antibiotic for urinary tract infection (UTI) AND [2] female    Negative: [1] Taking antibiotic for urinary tract infection (UTI) AND [2] male    Negative: [1] Pain or burning with passing urine (urination) AND [2] pregnant    Negative: [1] Pain or burning with passing urine (urination) AND [2] postpartum (from 0 to 6 weeks after delivery)    Negative: [1] Pain or burning with passing urine (urination) AND [2] female    Negative: [1] Pain or burning with passing urine (urination) AND [2] male    Negative: Pain or itching in the vulvar area    Negative: Pain in scrotum is main symptom    Negative: Blood in the urine is main symptom    Negative: Symptoms arising from use of a urinary catheter (e.g., coude, Adames)    Negative: [1] Unable to urinate (or only a few drops) > 4 hours AND [2] bladder feels very full (e.g., palpable bladder or strong urge to urinate)     Negative: [1] Decreased urination and [2] drinking very little AND [2] dehydration suspected (e.g., dark urine, no urine > 12 hours, very dry mouth, very lightheaded)    Negative: Patient sounds very sick or weak to the triager    Negative: Fever > 100.4 F (38.0 C)    Negative: Side (flank) or lower back pain present    Negative: [1] Can't control passage of urine (i.e., urinary incontinence) AND [2] new-onset (< 2 weeks) or worsening    Protocols used: URINARY SYMPTOMS-A-AH

## 2022-12-19 NOTE — TELEPHONE ENCOUNTER
Samy and left message for patient to return call regarding refill on medication. Callback number provided.    Please ask patient when she calls back: Seems like she was given a Rx for Macrobid during her OV. Is there a reason she is requesting for a refill?    Lary Murillo on 12/19/2022 at 12:43 PM

## 2022-12-21 NOTE — TELEPHONE ENCOUNTER
Called patient, no answer. Please call patient again at a later time. Reason why she's requesting refill regarding rx Macrobid?    Roberto Murillo MA on 12/21/2022 at 12:43 PM

## 2022-12-22 NOTE — TELEPHONE ENCOUNTER
Called patient, no answer. Left another voicemail providing call back number. If no call back, please call patient again at a later time.    Roberto Murillo MA on 12/21/2022 at 6:11 PM

## 2023-01-02 ENCOUNTER — OFFICE VISIT (OUTPATIENT)
Dept: OBGYN | Facility: CLINIC | Age: 38
End: 2023-01-02
Attending: REGISTERED NURSE
Payer: COMMERCIAL

## 2023-01-02 VITALS
DIASTOLIC BLOOD PRESSURE: 75 MMHG | HEIGHT: 68 IN | BODY MASS INDEX: 26.67 KG/M2 | HEART RATE: 69 BPM | SYSTOLIC BLOOD PRESSURE: 113 MMHG | WEIGHT: 176 LBS

## 2023-01-02 DIAGNOSIS — Z30.431 IUD CHECK UP: Primary | ICD-10-CM

## 2023-01-02 DIAGNOSIS — Z30.430 ENCOUNTER FOR INSERTION OF PARAGARD IUD: ICD-10-CM

## 2023-01-02 DIAGNOSIS — Z11.3 SCREEN FOR STD (SEXUALLY TRANSMITTED DISEASE): ICD-10-CM

## 2023-01-02 DIAGNOSIS — N39.0 FREQUENT UTI: ICD-10-CM

## 2023-01-02 PROCEDURE — G0463 HOSPITAL OUTPT CLINIC VISIT: HCPCS | Performed by: REGISTERED NURSE

## 2023-01-02 PROCEDURE — 87591 N.GONORRHOEAE DNA AMP PROB: CPT | Performed by: REGISTERED NURSE

## 2023-01-02 PROCEDURE — 99203 OFFICE O/P NEW LOW 30 MIN: CPT | Performed by: REGISTERED NURSE

## 2023-01-02 PROCEDURE — 87491 CHLMYD TRACH DNA AMP PROBE: CPT | Performed by: REGISTERED NURSE

## 2023-01-02 ASSESSMENT — PAIN SCALES - GENERAL: PAINLEVEL: NO PAIN (0)

## 2023-01-02 NOTE — PROGRESS NOTES
"Chief Complaint: IUD check and discuss possible removal     Subjective: 37 year old  presents for ParaGard IUD check . IUD was placed on 2020.   Patient's last menstrual period was 2022.    Adrian reports that she has recently had frequent UTIs which she believes are associated with her paragrd IUD, in the past month she has had 3 UTIs. In the past 2 years since placement though she has only had one other UTI. She has had paragard IUD in the past prior to having her son years ago and experienced this same issue with her IUD causing her frequent UTIs. She reports that no medical professionals have ever told her that the paragard is the cause of UTIs but she has done some of her own research and believes this to be true. Her last UTI was one week ago and she was given macrobid. She finished taking this 5 days ago. Denies current symptoms today and denies needing to leave urine sample. Accepts GCCT screening.     She desires to be on birth control and does not plan to have more children. She has heard about increased weight gain with some forms of birth control and does not like the idea of hormonal birth control, but she is open to a discussion. Would like pelvic exam today to confirm IUD is in place.    O:   Vitals:    23 1525   BP: 113/75   Pulse: 69   Weight: 79.8 kg (176 lb)   Height: 1.73 m (5' 8.11\")     -VS: reviewed and within normal limits   -General appearance: no acute distress, patient is comfortable   NEUROLOGICAL/PSYCHIATRIC   - Orientated x3,   -Mood and affect: : normal   PELVIC: Normal external female genitalia without lesions. Adequate perineal body, normal urethral meatus. Vagina moist and well rugated without lesions or discharge. Cervix pink, without lesions, normal vaginal discharge. White IUD strings visualized at 5 o'clock.     Assessment/Plan   - IUD check, in place  -  Offered referral to see Dr. Sr Uro-gynecologist to discuss ParaGard and possible correlation with " frequent UTIs.  - Reviewed contraceptive options with patient, educational handout given to review all options further at home. Pt agrees with this plan of care.    PARVEEN Benites CNM

## 2023-01-02 NOTE — LETTER
"2023       RE: Adrian Gomez  [address] [address]     Dear Colleague,    Thank you for referring your patient, Adrian Gomez, to the [facility] [place] [facility]. Please see a copy of my visit note below.    Chief Complaint: IUD check and discuss possible removal     Subjective: 37 year old  presents for ParaGard IUD check . IUD was placed on 2020.   Patient's last menstrual period was 2022.    Adrian reports that she has recently had frequent UTIs which she believes are associated with her paragrd IUD, in the past month she has had 3 UTIs. In the past 2 years since placement though she has only had one other UTI. She has had paragard IUD in the past prior to having her son years ago and experienced this same issue with her IUD causing her frequent UTIs. She reports that no medical professionals have ever told her that the paragard is the cause of UTIs but she has done some of her own research and believes this to be true. Her last UTI was one week ago and she was given macrobid. She finished taking this 5 days ago. Denies current symptoms today and denies needing to leave urine sample. Accepts GCCT screening.     She desires to be on birth control and does not plan to have more children. She has heard about increased weight gain with some forms of birth control and does not like the idea of hormonal birth control, but she is open to a discussion. Would like pelvic exam today to confirm IUD is in place.    O:   Vitals:    23 1525   BP: 113/75   Pulse: 69   Weight: 79.8 kg (176 lb)   Height: 1.73 m (5' 8.11\")     -VS: reviewed and within normal limits   -General appearance: no acute distress, patient is comfortable   NEUROLOGICAL/PSYCHIATRIC   - Orientated x3,   -Mood and affect: : normal   PELVIC: Normal external female genitalia without lesions. Adequate perineal body, normal urethral meatus. " Vagina moist and well rugated without lesions or discharge. Cervix pink, without lesions, normal vaginal discharge. White IUD strings visualized at 5 o'clock.     Assessment/Plan   - IUD check, in place  -  Offered referral to see Dr. Sr Uro-gynecologist to discuss ParaGard and possible correlation with frequent UTIs.  - Reviewed contraceptive options with patient, educational handout given to review all options further at home. Pt agrees with this plan of care.    PARVEEN Benites CNM

## 2023-01-03 ENCOUNTER — OFFICE VISIT (OUTPATIENT)
Dept: UROLOGY | Facility: CLINIC | Age: 38
End: 2023-01-03
Attending: OBSTETRICS & GYNECOLOGY
Payer: COMMERCIAL

## 2023-01-03 VITALS
DIASTOLIC BLOOD PRESSURE: 71 MMHG | SYSTOLIC BLOOD PRESSURE: 105 MMHG | HEART RATE: 65 BPM | HEIGHT: 69 IN | BODY MASS INDEX: 26.22 KG/M2 | WEIGHT: 177 LBS

## 2023-01-03 DIAGNOSIS — N39.0 RECURRENT UTI: Primary | ICD-10-CM

## 2023-01-03 LAB
C TRACH DNA SPEC QL NAA+PROBE: NEGATIVE
N GONORRHOEA DNA SPEC QL NAA+PROBE: NEGATIVE

## 2023-01-03 PROCEDURE — G0463 HOSPITAL OUTPT CLINIC VISIT: HCPCS | Performed by: OBSTETRICS & GYNECOLOGY

## 2023-01-03 PROCEDURE — 99214 OFFICE O/P EST MOD 30 MIN: CPT | Performed by: OBSTETRICS & GYNECOLOGY

## 2023-01-03 RX ORDER — NITROFURANTOIN MACROCRYSTALS 50 MG/1
CAPSULE ORAL
Qty: 15 CAPSULE | Refills: 1 | Status: SHIPPED | OUTPATIENT
Start: 2023-01-03 | End: 2023-01-11

## 2023-01-03 ASSESSMENT — ANXIETY QUESTIONNAIRES
7. FEELING AFRAID AS IF SOMETHING AWFUL MIGHT HAPPEN: NOT AT ALL
1. FEELING NERVOUS, ANXIOUS, OR ON EDGE: NOT AT ALL
2. NOT BEING ABLE TO STOP OR CONTROL WORRYING: NOT AT ALL
5. BEING SO RESTLESS THAT IT IS HARD TO SIT STILL: NOT AT ALL
3. WORRYING TOO MUCH ABOUT DIFFERENT THINGS: NOT AT ALL
GAD7 TOTAL SCORE: 0
GAD7 TOTAL SCORE: 0
6. BECOMING EASILY ANNOYED OR IRRITABLE: NOT AT ALL

## 2023-01-03 ASSESSMENT — PAIN SCALES - GENERAL: PAINLEVEL: NO PAIN (0)

## 2023-01-03 ASSESSMENT — PATIENT HEALTH QUESTIONNAIRE - PHQ9
5. POOR APPETITE OR OVEREATING: NOT AT ALL
SUM OF ALL RESPONSES TO PHQ QUESTIONS 1-9: 0

## 2023-01-03 NOTE — PROGRESS NOTES
January 3, 2023    Referring Provider: No referring provider defined for this encounter.    Primary Care Provider: Jessica Nunez    CC: recurrent UTi    HPI:  Adrian Gomez is a 37 year old  female who presents for evaluation of recurrent UTIs for the the last 2 years or so. She has had 2-3 infections in the last year. She says that her first infection two years ago was severe and was suspected to be pyelonephritis involving IV treatment in the emergency room.Has had three infections in the last year and all associated with dysuria, urgency and frequency. Her last infection was in early 2022 (ECOLI, ESBL). She was initially treated with cipro but was changed to nitrofurantoin due to resistance. She says that her infections almost always follow intercourse. Not with a new partner. She also has had placement of paraguard IUD two years ago and wonders if this is associated somehow and wants to know if it is necessary to remove the IUD. She has had bacterial vaginosis at the time of her UTI in October and was treated for this as well.    UTI hx   12.5.11: >100k ecoli ESBL, multidrug resistant including to cipro , cefazolin and bactrim. Sensitive to nitrofurantoin. Treated with with cipro first for suspected pyelo but changed to nitrofurantoin due to resistance.  21:  10-50k ecoli, pansentive, treated nitrofurantoin  10/23/21: 50-100k ecoli, N/V and flank pain.  pansensitive, treated with cipro(ED)    Urinary Symptoms/Voiding function  Aside from her recurrent UTI ( no symptoms currently), denies any urinary urgency, frequency, or bothersome incontinence. No voiding difficulties although she says she holds her bladder for a long time often. 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     Pelvic Organ Prolapse Symptoms  Denies vaginal bulge, pressure sensation or protrusion.    Gastrointestinal Symptoms:  Denies chronic diarrhea, constipation. Denies bothersome fecal or flatal incontinence. Denies defecatory  difficulties.     Sexual function/Pelvic floor pain/GYN:   Sexually active. No pain with intercourse. Paraguard for contraception.       Relevant Medical History:    Diabetes? no  High Blood pressure? no     Recurrent UTIs? yes  Sleep Apnea? no  Obesity? no  History of Blood clots? no  Other medical problems: none    Surgical History:      Past Surgical History:   Procedure Laterality Date     None         OB/Gyn History:  OB History    Para Term  AB Living   2 2 2 0 0 2   SAB IAB Ectopic Multiple Live Births   0 0 0 0 2      # Outcome Date GA Lbr Jared/2nd Weight Sex Delivery Anes PTL Lv   2 Term 19 38w3d 11:15 / 00:11 3.714 kg (8 lb 3 oz) F Vag-Spont EPI, IV N MARY      Name: GABBY SOW-MILAD      Apgar1: 9  Apgar5: 9   1 Term 12/04/15 40w5d 05:08 / 03:48 3.912 kg (8 lb 10 oz) M Vag-Spont EPI  MARY      Birth Comments: Hearing screen:  passed  CHD screen: passed  Hep B in hospital: Yes  Low intermediate risk bili      Name: Jerson      Apgar1: 6  Apgar5: 9       Medications/Vitamins/Supplements:   Current Outpatient Medications   Medication     acetaminophen (TYLENOL) 325 MG tablet     nitroFURantoin macrocrystal (MACRODANTIN) 50 MG capsule     Current Facility-Administered Medications   Medication     paragard intrauterine copper IUD device 1 each         Medical History:      Past Medical History:   Diagnosis Date     Personal history of tuberculosis 2004    Finished on medications for 1 year in 2004     ROS  Social History    Social History     Socioeconomic History     Marital status:      Spouse name: Not on file     Number of children: 2     Years of education: Not on file     Highest education level: Not on file   Occupational History     Occupation: Lab Tech     Employer: [employer]     Occupation: Hospitals in [place]   Tobacco Use     Smoking status: Never     Smokeless tobacco: Never   Vaping Use     Vaping Use: Never used   Substance and Sexual  "Activity     Alcohol use: Never     Comment: 2 drinks a month, none since pregnant     Drug use: No     Sexual activity: Yes     Partners: Male     Birth control/protection: Condom   Other Topics Concern     Parent/sibling w/ CABG, MI or angioplasty before 65F 55M? No   Social History Narrative     Not on file     Social Determinants of Health     Financial Resource Strain: Not on file   Food Insecurity: Not on file   Transportation Needs: Not on file   Physical Activity: Not on file   Stress: Not on file   Social Connections: Not on file   Intimate Partner Violence: Not on file   Housing Stability: Not on file       Family History  Family History   Problem Relation Age of Onset     Family History Negative Mother      Family History Negative Father      Heart Disease Maternal Grandmother      Heart Disease Maternal Grandfather      Heart Disease Paternal Grandmother      Heart Disease Paternal Grandfather      Diabetes No family hx of      Hypertension No family hx of        Allergy    Allergies   Allergen Reactions     Amoxicillin      rash       Current Outpatient Medications   Medication     acetaminophen (TYLENOL) 325 MG tablet     nitroFURantoin macrocrystal (MACRODANTIN) 50 MG capsule     Current Facility-Administered Medications   Medication     paragard intrauterine copper IUD device 1 each       Physical Exam:   /71   Pulse 65   Ht 1.74 m (5' 8.5\")   Wt 80.3 kg (177 lb)   LMP 12/11/2022   Breastfeeding Yes   BMI 26.52 kg/m   Patient's last menstrual period was 12/11/2022. Body mass index is 26.52 kg/m .    Gen:  is alert, comfortable in no acute distress,  Abdomen: Abdomen is soft, non-tender, non-distended,   Lungs: non-labored breathing.     Pelvic Exam:   Normal external female genitalia. The urethra was normal, no diverticulum.    Vagina: well supported. Wet prep and GC/CL pending from the day prior  Uterus: normal size, none tender  Ovaries: no palpable mass  Vulva: no lesions, no " pain  Rectal: deferred    Pelvic floor muscles: no myalgia    Voiding trial:    VOID 150 ml  PVR 3 mL by Bladder ultrasound  Leak with Cough stress test : no, Prolapse reduced : no    Labs:   Color Urine (no units)   Date Value   12/17/2022 Yellow   12/10/2018 Yellow     Appearance Urine (no units)   Date Value   12/17/2022 Clear   12/10/2018 Clear     Glucose Urine (mg/dL)   Date Value   12/17/2022 100 (A)   12/10/2018 Negative     Bilirubin Urine (no units)   Date Value   12/17/2022 Negative   12/10/2018 Negative     Ketones Urine (mg/dL)   Date Value   12/17/2022 Negative   12/10/2018 Negative     Specific Gravity Urine (no units)   Date Value   12/17/2022 1.020   12/10/2018 1.015     pH Urine   Date Value   12/17/2022 5.5   12/10/2018 8.0 pH (H)     Protein Albumin Urine (mg/dL)   Date Value   12/17/2022 Negative   12/10/2018 Negative     Urobilinogen Urine   Date Value   12/17/2022 0.2 E.U./dL   12/10/2018 0.2 EU/dL     Nitrite Urine (no units)   Date Value   12/17/2022 Negative   12/10/2018 Negative     Leukocyte Esterase Urine (no units)   Date Value   12/17/2022 Negative   12/10/2018 Negative     CBC RESULTS:   Recent Labs   Lab Test 10/23/21  1809   WBC 7.0   RBC 4.52   HGB 12.6   HCT 38.6   MCV 85   MCH 27.9   MCHC 32.6   RDW 12.7        @lastcmp@    A/P: Adrian Gomez is a 37 year old F with     Adrian was seen today for establish care.    Diagnoses and all orders for this visit:    Recurrent UTI  -     Urine Culture Aerobic Bacterial; Standing  -     nitroFURantoin macrocrystal (MACRODANTIN) 50 MG capsule; Take 1 tablet by mouth either just before or just after intercourse to prevent recurrent urinary tract infection.      Seems to be correlated with intercourse. Not great consistent evidence for association between UTI with IUDs based on systematic reviews although it is possible for some patients. We will start with post coital prophylaxis for the next 6 months for now and will reevaluate after 6  months. If she continues to have persistent UTIs despite the prophylaxis, will need to do upper urinary tract imaging and cysto for further work up before considering removing the IUD. Patient is aggreeable with this plan       I spent a total of 45 minutes with  Adrian Gomez  on the date of the encounter in chart review, face to face patient visit, review of tests, documentation and/or discussion with other providers about the issues documented above.     Catina Sr MD, [facility]  , Department of OBGYN  Female Pelvic Medicine and Reconstructive Surgery ( Urogynecology)  CC  Patient Care Team:  Jessica Nunez MD as PCP - General (Family Medicine)  Jessica Nunez MD as Assigned PCP  Kimberly Combs APRN CNM as Midwife

## 2023-01-11 ENCOUNTER — OFFICE VISIT (OUTPATIENT)
Dept: FAMILY MEDICINE | Facility: CLINIC | Age: 38
End: 2023-01-11
Payer: COMMERCIAL

## 2023-01-11 VITALS
DIASTOLIC BLOOD PRESSURE: 73 MMHG | BODY MASS INDEX: 27.88 KG/M2 | RESPIRATION RATE: 16 BRPM | SYSTOLIC BLOOD PRESSURE: 123 MMHG | WEIGHT: 177.6 LBS | OXYGEN SATURATION: 97 % | HEART RATE: 75 BPM | TEMPERATURE: 98.5 F | HEIGHT: 67 IN

## 2023-01-11 DIAGNOSIS — L30.9 ECZEMA, UNSPECIFIED TYPE: Primary | ICD-10-CM

## 2023-01-11 PROCEDURE — 99213 OFFICE O/P EST LOW 20 MIN: CPT | Performed by: FAMILY MEDICINE

## 2023-01-11 RX ORDER — TRIAMCINOLONE ACETONIDE 1 MG/G
CREAM TOPICAL 2 TIMES DAILY
Qty: 45 G | Refills: 0 | Status: SHIPPED | OUTPATIENT
Start: 2023-01-11

## 2023-01-11 NOTE — PROGRESS NOTES
"  Assessment & Plan     Eczema, unspecified type  Use Hypoallerginic Product for hair  - triamcinolone (KENALOG) 0.1 % external cream; Apply topically 2 times daily  Use sparingly  Follow up if not better  Pt declined flu and covid vaccine  Return in about 1 month (around 2/11/2023) for Physical Exam.    Jessica Nunez MD  [facility] GEE Campbell is a 37 year old accompanied by her self, presenting for the following health issues:  Rash (Rash behind left ear x 2 months)      Rash  Associated symptoms include a rash.   History of Present Illness       Reason for visit:  Rash behind left ear x 2 months  Symptom onset:  More than a month  Symptoms include:  Dry skin  Symptom intensity:  Mild  Symptom progression:  Staying the same  Had these symptoms before:  No  What makes it worse:  None  What makes it better:  None    She eats 2-3 servings of fruits and vegetables daily.She consumes 0 sweetened beverage(s) daily.She exercises with enough effort to increase her heart rate 10 to 19 minutes per day.  She exercises with enough effort to increase her heart rate 3 or less days per week.   She is taking medications regularly.     Uses some leave in conditioners      Review of Systems   Skin: Positive for rash.    Rest of the ROS is Negative except see above and Problem list [stable]        Objective    /73   Pulse 75   Temp 98.5  F (36.9  C) (Oral)   Resp 16   Ht 1.704 m (5' 7.09\")   Wt 80.6 kg (177 lb 9.6 oz)   LMP 12/11/2022   SpO2 97%   BMI 27.74 kg/m    Body mass index is 27.74 kg/m .  Physical Exam   GENERAL: healthy, alert and no distress  Left postauricular area-dry scaley rash, -eczema        "

## 2023-01-15 ENCOUNTER — HEALTH MAINTENANCE LETTER (OUTPATIENT)
Age: 38
End: 2023-01-15

## 2023-12-18 ENCOUNTER — TELEPHONE (OUTPATIENT)
Dept: FAMILY MEDICINE | Facility: CLINIC | Age: 38
End: 2023-12-18

## 2023-12-18 NOTE — TELEPHONE ENCOUNTER
Patient Quality Outreach    Patient is due for the following:   Cervical Cancer Screening - PAP Needed  Physical Preventive Adult Physical    Next Steps:   Schedule a Adult Preventative    Type of outreach:    Sent [employer] message.    Next Steps:  Reach out within 90 days via Letter.    Max number of attempts reached: No. Will try again in 90 days if patient still on fail list.    Questions for provider review:    None           CLEMENCIA LOREDO MA  Chart routed to self.

## 2024-02-17 ENCOUNTER — HEALTH MAINTENANCE LETTER (OUTPATIENT)
Age: 39
End: 2024-02-17

## 2024-03-25 ENCOUNTER — E-VISIT (OUTPATIENT)
Dept: URGENT CARE | Facility: CLINIC | Age: 39
End: 2024-03-25
Payer: COMMERCIAL

## 2024-03-25 DIAGNOSIS — N39.0 ACUTE UTI (URINARY TRACT INFECTION): Primary | ICD-10-CM

## 2024-03-25 PROCEDURE — 99421 OL DIG E/M SVC 5-10 MIN: CPT | Performed by: PHYSICIAN ASSISTANT

## 2024-03-25 RX ORDER — SULFAMETHOXAZOLE/TRIMETHOPRIM 800-160 MG
1 TABLET ORAL 2 TIMES DAILY
Qty: 6 TABLET | Refills: 0 | Status: SHIPPED | OUTPATIENT
Start: 2024-03-25 | End: 2024-03-26

## 2024-03-25 NOTE — PATIENT INSTRUCTIONS
Dear Adrian Gomez    After reviewing your responses, I've been able to diagnose you with a urinary tract infection, which is a common infection of the bladder with bacteria.  This is not a sexually transmitted infection, though urinating immediately after intercourse can help prevent infections.  Drinking lots of fluids is also helpful to clear your current infection and prevent the next one.      I have sent a prescription for antibiotics to your pharmacy to treat this infection.    It is important that you take all of your prescribed medication even if your symptoms are improving after a few doses.  Taking all of your medicine helps prevent the symptoms from returning.     If your symptoms worsen, you develop pain in your back or stomach, develop fevers, or are not improving in 5 days, please contact your primary care provider for an appointment or visit any of our convenient Walk-in or Urgent Care Centers to be seen, which can be found on our website here.    Thanks again for choosing us as your health care partner,    Roberto Wiggins PA-C

## 2024-03-26 ENCOUNTER — TELEPHONE (OUTPATIENT)
Dept: FAMILY MEDICINE | Facility: CLINIC | Age: 39
End: 2024-03-26
Payer: COMMERCIAL

## 2024-03-26 DIAGNOSIS — N30.00 ACUTE CYSTITIS WITHOUT HEMATURIA: Primary | ICD-10-CM

## 2024-03-26 RX ORDER — NITROFURANTOIN 25; 75 MG/1; MG/1
100 CAPSULE ORAL 2 TIMES DAILY
Qty: 10 CAPSULE | Refills: 0 | Status: SHIPPED | OUTPATIENT
Start: 2024-03-26 | End: 2024-03-26

## 2024-03-26 RX ORDER — NITROFURANTOIN 25; 75 MG/1; MG/1
100 CAPSULE ORAL 2 TIMES DAILY
Qty: 10 CAPSULE | Refills: 0 | Status: SHIPPED | OUTPATIENT
Start: 2024-03-26

## 2024-03-26 NOTE — TELEPHONE ENCOUNTER
Spoke with patient. Informed patient that new antibiotic was sent in. Patient has no further questions at this time.    Patient requested to cancel appointment for tomorrow, and plans to follow-up if her symptoms do not improve.    ALEX Wolfe RN  [facility]

## 2024-03-26 NOTE — TELEPHONE ENCOUNTER
"Received call from patient. She is calling to follow-up, as she had submitted an eVisit yesterday for UTI and prescribed Bactrim.    Patient explains that she recalls that Bactrim was resistant to her previous urine culture in the past. She also explains that she has not experienced relief in any of her symptoms.     Noted on urine culture from 12/5/2022, Bactrim was flagged as \"resistant\".     Patient has appointment tomorrow with PCP, however, requesting message to be sent to see if PCP able to change prescriptions being that she had completed eVisit already. Patient is anxious as she is worried that her UTI will become worse if this isn't addressed by today.    Routing to covering provider pool to please advise, as PCP and prescribing provider GURINDER today.    PAULA WolfeN RN  [facility]  "

## 2024-03-26 NOTE — TELEPHONE ENCOUNTER
Signed Prescriptions:                        Disp   Refills    nitroFURantoin macrocrystal-monohydrate (M*10 cap*0        Sig: Take 1 capsule (100 mg) by mouth 2 times daily for 5           days  Authorizing Provider: WILLOW BLUM

## 2024-03-26 NOTE — TELEPHONE ENCOUNTER
Patient calling to state her primary pharmacy where she had the Macrobid sent is closed today due to no pharmacist on staff as they called in.     Patient really wanting to start treatment today and requested that Macrobid be sent to alternative pharmacy. [facility] off of [address] [facility] and [facility].       RN send already prescribed medication to patients alternative pharmacy per patient request.     Britney Rudolph RN on 3/26/2024 at 3:48 PM

## 2024-03-26 NOTE — TELEPHONE ENCOUNTER
Attempted call to pt. Voicemail box has not been set up yet. Will need to try again at another time.    Marissa José RN  [facility]

## 2025-03-08 ENCOUNTER — HEALTH MAINTENANCE LETTER (OUTPATIENT)
Age: 40
End: 2025-03-08